# Patient Record
Sex: FEMALE | Race: WHITE | NOT HISPANIC OR LATINO | Employment: FULL TIME | URBAN - METROPOLITAN AREA
[De-identification: names, ages, dates, MRNs, and addresses within clinical notes are randomized per-mention and may not be internally consistent; named-entity substitution may affect disease eponyms.]

---

## 2017-01-16 ENCOUNTER — TELEPHONE (OUTPATIENT)
Dept: FAMILY MEDICINE CLINIC | Facility: CLINIC | Age: 57
End: 2017-01-16

## 2017-01-16 RX ORDER — EZETIMIBE 10 MG/1
10 TABLET ORAL DAILY
Qty: 30 TABLET | Refills: 1 | Status: SHIPPED | OUTPATIENT
Start: 2017-01-16 | End: 2017-02-27 | Stop reason: SDUPTHER

## 2017-01-16 RX ORDER — MELOXICAM 15 MG/1
15 TABLET ORAL DAILY
Qty: 30 TABLET | Refills: 5 | Status: SHIPPED | OUTPATIENT
Start: 2017-01-16 | End: 2017-06-09 | Stop reason: ALTCHOICE

## 2017-01-16 NOTE — TELEPHONE ENCOUNTER
Pt stated her finger is still hurting from arthritis and she wants Rx. She said she discussed with you at last visit and you told her you could prescribe something for it, but she can't remember the name.

## 2017-02-15 ENCOUNTER — TELEPHONE (OUTPATIENT)
Dept: FAMILY MEDICINE CLINIC | Facility: CLINIC | Age: 57
End: 2017-02-15

## 2017-02-28 RX ORDER — EZETIMIBE 10 MG/1
TABLET ORAL
Qty: 30 TABLET | Refills: 0 | Status: SHIPPED | OUTPATIENT
Start: 2017-02-28 | End: 2017-03-29 | Stop reason: ALTCHOICE

## 2017-03-29 ENCOUNTER — OFFICE VISIT (OUTPATIENT)
Dept: FAMILY MEDICINE CLINIC | Facility: CLINIC | Age: 57
End: 2017-03-29

## 2017-03-29 VITALS
SYSTOLIC BLOOD PRESSURE: 122 MMHG | BODY MASS INDEX: 19.62 KG/M2 | WEIGHT: 125 LBS | OXYGEN SATURATION: 100 % | DIASTOLIC BLOOD PRESSURE: 56 MMHG | TEMPERATURE: 98.5 F | HEART RATE: 70 BPM | HEIGHT: 67 IN | RESPIRATION RATE: 14 BRPM

## 2017-03-29 DIAGNOSIS — F51.01 PRIMARY INSOMNIA: ICD-10-CM

## 2017-03-29 DIAGNOSIS — E78.5 HYPERLIPIDEMIA, UNSPECIFIED HYPERLIPIDEMIA TYPE: Primary | ICD-10-CM

## 2017-03-29 PROCEDURE — 99214 OFFICE O/P EST MOD 30 MIN: CPT | Performed by: NURSE PRACTITIONER

## 2017-03-29 RX ORDER — ZOLPIDEM TARTRATE 12.5 MG/1
12.5 TABLET, FILM COATED, EXTENDED RELEASE ORAL NIGHTLY PRN
Qty: 90 TABLET | Refills: 0 | Status: SHIPPED | OUTPATIENT
Start: 2017-03-29 | End: 2017-05-10 | Stop reason: SDUPTHER

## 2017-03-29 NOTE — PROGRESS NOTES
"Subjective   Taty Rosario is a 56 y.o. female.     History of Present Illness   Patient states she's been using his anxiety as directed and as always with any type of cholesterol medication is causing her to have severe joint pain throughout her body.    Patient here for refill of her Ambien.  She's been taking a half of the pill at night but she's been waking up lately between 2 and 5 and unable to go to sleep.  She's wondering whether something else that she can do for this.    The following portions of the patient's history were reviewed and updated as appropriate: allergies, current medications, past social history and problem list.    Review of Systems   Constitutional: Positive for fatigue.   All other systems reviewed and are negative.      Objective   /56 (BP Location: Left arm, Patient Position: Sitting, Cuff Size: Adult)  Pulse 70  Temp 98.5 °F (36.9 °C) (Oral)   Resp 14  Ht 66.5\" (168.9 cm)  Wt 125 lb (56.7 kg)  SpO2 100%  BMI 19.87 kg/m2  Physical Exam   Constitutional: She is oriented to person, place, and time. Vital signs are normal. She appears well-developed and well-nourished. No distress.   HENT:   Head: Normocephalic.   Cardiovascular: Normal rate, regular rhythm and normal heart sounds.    Pulmonary/Chest: Effort normal and breath sounds normal.   Neurological: She is alert and oriented to person, place, and time. Gait normal.   Psychiatric: She has a normal mood and affect. Her behavior is normal. Judgment and thought content normal.   Vitals reviewed.    The patient has read and signed the [facility] Controlled Substance Contract.  I will continue to see patient for regular follow up appointments.  They are well controlled on their medication.  CALEB has been reviewed by me and is updated every 3 months. The patient is aware of the potential for addiction and dependence.    Assessment/Plan   Problem List Items Addressed This Visit        Cardiovascular and Mediastinum    " Hyperlipidemia - Primary       Other    Insomnia    Relevant Medications    zolpidem CR (AMBIEN CR) 12.5 MG CR tablet        rto in 3 mons   Take a whole Ambien instead of just have to see if that works does not call the office and we will switch the medication to Lunesta.  Stopped his study and we are still trying to get the injection ordered for her I will contact drug rep ASAP

## 2017-05-10 ENCOUNTER — TELEPHONE (OUTPATIENT)
Dept: FAMILY MEDICINE CLINIC | Facility: CLINIC | Age: 57
End: 2017-05-10

## 2017-05-10 DIAGNOSIS — F51.01 PRIMARY INSOMNIA: ICD-10-CM

## 2017-05-10 RX ORDER — ZOLPIDEM TARTRATE 12.5 MG/1
12.5 TABLET, FILM COATED, EXTENDED RELEASE ORAL NIGHTLY PRN
Qty: 30 TABLET | Refills: 0 | OUTPATIENT
Start: 2017-05-10 | End: 2017-06-09 | Stop reason: SDUPTHER

## 2017-05-17 ENCOUNTER — TELEPHONE (OUTPATIENT)
Dept: CARDIOLOGY | Facility: CLINIC | Age: 57
End: 2017-05-17

## 2017-05-18 ENCOUNTER — OFFICE VISIT (OUTPATIENT)
Dept: CARDIOLOGY | Facility: CLINIC | Age: 57
End: 2017-05-18

## 2017-05-18 ENCOUNTER — HOSPITAL ENCOUNTER (OUTPATIENT)
Dept: CARDIOLOGY | Facility: HOSPITAL | Age: 57
Discharge: HOME | End: 2017-05-18
Attending: INTERNAL MEDICINE | Admitting: INTERNAL MEDICINE

## 2017-05-18 VITALS
WEIGHT: 124.4 LBS | BODY MASS INDEX: 19.99 KG/M2 | HEIGHT: 66 IN | HEART RATE: 79 BPM | DIASTOLIC BLOOD PRESSURE: 88 MMHG | SYSTOLIC BLOOD PRESSURE: 122 MMHG

## 2017-05-18 DIAGNOSIS — R07.2 PRECORDIAL PAIN: ICD-10-CM

## 2017-05-18 DIAGNOSIS — I25.10 CORONARY ARTERY DISEASE INVOLVING NATIVE CORONARY ARTERY OF NATIVE HEART WITHOUT ANGINA PECTORIS: Primary | ICD-10-CM

## 2017-05-18 LAB
BH CV STRESS BP STAGE 1: NORMAL
BH CV STRESS BP STAGE 2: NORMAL
BH CV STRESS BP STAGE 3: NORMAL
BH CV STRESS DURATION MIN STAGE 1: 3
BH CV STRESS DURATION MIN STAGE 2: 3
BH CV STRESS DURATION MIN STAGE 3: 3
BH CV STRESS DURATION MIN STAGE 4: 1
BH CV STRESS DURATION SEC STAGE 1: 0
BH CV STRESS DURATION SEC STAGE 2: 0
BH CV STRESS DURATION SEC STAGE 3: 0
BH CV STRESS DURATION SEC STAGE 4: 30
BH CV STRESS GRADE STAGE 1: 10
BH CV STRESS GRADE STAGE 2: 12
BH CV STRESS GRADE STAGE 3: 14
BH CV STRESS GRADE STAGE 4: 16
BH CV STRESS HR STAGE 1: 113
BH CV STRESS HR STAGE 2: 137
BH CV STRESS HR STAGE 3: 161
BH CV STRESS HR STAGE 4: 171
BH CV STRESS METS STAGE 1: 5
BH CV STRESS METS STAGE 2: 7.5
BH CV STRESS METS STAGE 3: 10
BH CV STRESS METS STAGE 4: 13.5
BH CV STRESS PROTOCOL 1: NORMAL
BH CV STRESS RECOVERY BP: NORMAL MMHG
BH CV STRESS RECOVERY HR: 98 BPM
BH CV STRESS SPEED STAGE 1: 1.7
BH CV STRESS SPEED STAGE 2: 2.5
BH CV STRESS SPEED STAGE 3: 3.4
BH CV STRESS SPEED STAGE 4: 4.2
BH CV STRESS STAGE 1: 1
BH CV STRESS STAGE 2: 2
BH CV STRESS STAGE 3: 3
BH CV STRESS STAGE 4: 4
MAXIMAL PREDICTED HEART RATE: 166 BPM
PERCENT MAX PREDICTED HR: 103.01 %
STRESS BASELINE BP: NORMAL MMHG
STRESS BASELINE HR: 86 BPM
STRESS PERCENT HR: 121 %
STRESS POST ESTIMATED WORKLOAD: 11 METS
STRESS POST EXERCISE DUR MIN: 10 MIN
STRESS POST EXERCISE DUR SEC: 30 SEC
STRESS POST PEAK BP: NORMAL MMHG
STRESS POST PEAK HR: 171 BPM
STRESS TARGET HR: 141 BPM

## 2017-05-18 PROCEDURE — 93017 CV STRESS TEST TRACING ONLY: CPT

## 2017-05-18 PROCEDURE — 93000 ELECTROCARDIOGRAM COMPLETE: CPT | Performed by: INTERNAL MEDICINE

## 2017-05-18 PROCEDURE — 93016 CV STRESS TEST SUPVJ ONLY: CPT | Performed by: INTERNAL MEDICINE

## 2017-05-18 PROCEDURE — 99213 OFFICE O/P EST LOW 20 MIN: CPT | Performed by: INTERNAL MEDICINE

## 2017-05-18 PROCEDURE — 93018 CV STRESS TEST I&R ONLY: CPT | Performed by: INTERNAL MEDICINE

## 2017-05-18 RX ORDER — ASPIRIN 81 MG/1
81 TABLET ORAL DAILY
COMMUNITY

## 2017-05-18 RX ORDER — CETIRIZINE HYDROCHLORIDE 10 MG/1
10 TABLET ORAL DAILY
COMMUNITY
End: 2022-03-07

## 2017-06-09 ENCOUNTER — OFFICE VISIT (OUTPATIENT)
Dept: FAMILY MEDICINE CLINIC | Facility: CLINIC | Age: 57
End: 2017-06-09

## 2017-06-09 VITALS
BODY MASS INDEX: 19.8 KG/M2 | TEMPERATURE: 98.3 F | WEIGHT: 123.2 LBS | OXYGEN SATURATION: 98 % | DIASTOLIC BLOOD PRESSURE: 82 MMHG | SYSTOLIC BLOOD PRESSURE: 122 MMHG | HEIGHT: 66 IN | HEART RATE: 82 BPM

## 2017-06-09 DIAGNOSIS — N39.0 URINARY TRACT INFECTION, SITE UNSPECIFIED: ICD-10-CM

## 2017-06-09 DIAGNOSIS — F51.01 PRIMARY INSOMNIA: ICD-10-CM

## 2017-06-09 DIAGNOSIS — M19.90 ARTHRITIS: ICD-10-CM

## 2017-06-09 DIAGNOSIS — R30.0 DYSURIA: Primary | ICD-10-CM

## 2017-06-09 LAB
BILIRUB BLD-MCNC: NEGATIVE MG/DL
CLARITY, POC: CLEAR
COLOR UR: YELLOW
GLUCOSE UR STRIP-MCNC: NEGATIVE MG/DL
KETONES UR QL: NEGATIVE
LEUKOCYTE EST, POC: ABNORMAL
NITRITE UR-MCNC: NEGATIVE MG/ML
PH UR: 6.5 [PH] (ref 5–8)
PROT UR STRIP-MCNC: ABNORMAL MG/DL
RBC # UR STRIP: ABNORMAL /UL
SP GR UR: 1.02 (ref 1–1.03)
UROBILINOGEN UR QL: NORMAL

## 2017-06-09 PROCEDURE — 99214 OFFICE O/P EST MOD 30 MIN: CPT | Performed by: NURSE PRACTITIONER

## 2017-06-09 PROCEDURE — 81003 URINALYSIS AUTO W/O SCOPE: CPT | Performed by: NURSE PRACTITIONER

## 2017-06-09 RX ORDER — SULFAMETHOXAZOLE AND TRIMETHOPRIM 800; 160 MG/1; MG/1
1 TABLET ORAL 2 TIMES DAILY
Qty: 10 TABLET | Refills: 0 | Status: SHIPPED | OUTPATIENT
Start: 2017-06-09 | End: 2017-08-07

## 2017-06-09 NOTE — PROGRESS NOTES
"Subjective   Taty Rosario is a 56 y.o. female.     History of Present Illness   Urinary Tract Infection: Patient complains of abnormal smelling urine and burning with urination She has had symptoms for 3 days. Patient denies back pain and fever. Patient does not have a history of recurrent UTI.  Patient does not have a history of pyelonephritis.       David joints still hurting [place]ol[place] in the morning and the mobic is not helping at all.     The following portions of the patient's history were reviewed and updated as appropriate: allergies, current medications, past social history and problem list.    Review of Systems   Genitourinary: Positive for dysuria and urgency.   All other systems reviewed and are negative.      Objective   /82 (BP Location: Right arm, Patient Position: Sitting)  Pulse 82  Temp 98.3 °F (36.8 °C)  Ht 65.5\" (166.4 cm)  Wt 123 lb 3.2 oz (55.9 kg)  SpO2 98%  BMI 20.19 kg/m2  Physical Exam   Constitutional: She is oriented to person, place, and time. Vital signs are normal. She appears well-developed and well-nourished. No distress.   HENT:   Head: Normocephalic.   Cardiovascular: Normal rate, regular rhythm and normal heart sounds.    Pulmonary/Chest: Effort normal and breath sounds normal.   Neurological: She is alert and oriented to person, place, and time. Gait normal.   Psychiatric: She has a normal mood and affect. Her behavior is normal. Judgment and thought content normal.   Vitals reviewed.      Assessment/Plan   Problem List Items Addressed This Visit        Nervous and Auditory    Dysuria - Primary    Relevant Orders    POC Urinalysis Dipstick, Automated       Musculoskeletal and Integument    Arthritis    Relevant Medications    diclofenac (VOLTAREN) 50 MG EC tablet       Genitourinary    Urinary tract infection    Relevant Medications    sulfamethoxazole-trimethoprim (BACTRIM DS) 800-160 MG per tablet        rto prn     "

## 2017-06-12 ENCOUNTER — TELEPHONE (OUTPATIENT)
Dept: FAMILY MEDICINE CLINIC | Facility: CLINIC | Age: 57
End: 2017-06-12

## 2017-06-12 RX ORDER — ZOLPIDEM TARTRATE 12.5 MG/1
TABLET, FILM COATED, EXTENDED RELEASE ORAL
Qty: 30 TABLET | Refills: 0 | OUTPATIENT
Start: 2017-06-12 | End: 2017-07-10 | Stop reason: SDUPTHER

## 2017-07-10 DIAGNOSIS — F51.01 PRIMARY INSOMNIA: ICD-10-CM

## 2017-07-10 RX ORDER — ZOLPIDEM TARTRATE 12.5 MG/1
12.5 TABLET, FILM COATED, EXTENDED RELEASE ORAL NIGHTLY PRN
Qty: 30 TABLET | Refills: 0 | OUTPATIENT
Start: 2017-07-10 | End: 2017-08-07 | Stop reason: SDUPTHER

## 2017-08-07 ENCOUNTER — OFFICE VISIT (OUTPATIENT)
Dept: FAMILY MEDICINE CLINIC | Facility: CLINIC | Age: 57
End: 2017-08-07

## 2017-08-07 VITALS
OXYGEN SATURATION: 98 % | TEMPERATURE: 98.5 F | HEART RATE: 70 BPM | DIASTOLIC BLOOD PRESSURE: 70 MMHG | BODY MASS INDEX: 20.22 KG/M2 | WEIGHT: 125.8 LBS | SYSTOLIC BLOOD PRESSURE: 120 MMHG | HEIGHT: 66 IN

## 2017-08-07 DIAGNOSIS — Z00.00 ROUTINE GENERAL MEDICAL EXAMINATION AT HEALTH CARE FACILITY: Primary | ICD-10-CM

## 2017-08-07 DIAGNOSIS — F51.01 PRIMARY INSOMNIA: ICD-10-CM

## 2017-08-07 PROBLEM — N39.0 URINARY TRACT INFECTION: Status: RESOLVED | Noted: 2017-06-09 | Resolved: 2017-08-07

## 2017-08-07 PROBLEM — R30.0 DYSURIA: Status: RESOLVED | Noted: 2017-06-09 | Resolved: 2017-08-07

## 2017-08-07 LAB
BILIRUB BLD-MCNC: NEGATIVE MG/DL
CLARITY, POC: CLEAR
COLOR UR: YELLOW
GLUCOSE UR STRIP-MCNC: NEGATIVE MG/DL
KETONES UR QL: NEGATIVE
LEUKOCYTE EST, POC: NEGATIVE
NITRITE UR-MCNC: NEGATIVE MG/ML
PH UR: 7 [PH] (ref 5–8)
PROT UR STRIP-MCNC: NEGATIVE MG/DL
RBC # UR STRIP: NEGATIVE /UL
SP GR UR: 1.01 (ref 1–1.03)
UROBILINOGEN UR QL: NORMAL

## 2017-08-07 PROCEDURE — 81003 URINALYSIS AUTO W/O SCOPE: CPT | Performed by: NURSE PRACTITIONER

## 2017-08-07 PROCEDURE — 99396 PREV VISIT EST AGE 40-64: CPT | Performed by: NURSE PRACTITIONER

## 2017-08-07 RX ORDER — ZOLPIDEM TARTRATE 12.5 MG/1
12.5 TABLET, FILM COATED, EXTENDED RELEASE ORAL NIGHTLY PRN
Qty: 90 TABLET | Refills: 0 | Status: SHIPPED | OUTPATIENT
Start: 2017-08-07 | End: 2017-11-10 | Stop reason: SDUPTHER

## 2017-08-07 RX ORDER — EVOLOCUMAB 140 MG/ML
INJECTION, SOLUTION SUBCUTANEOUS
COMMUNITY
Start: 2017-07-30 | End: 2018-01-15 | Stop reason: SDUPTHER

## 2017-08-09 LAB
ALBUMIN SERPL-MCNC: 4.3 G/DL (ref 3.5–5.2)
ALBUMIN/GLOB SERPL: 1.8 G/DL
ALP SERPL-CCNC: 133 U/L (ref 39–117)
ALT SERPL-CCNC: 24 U/L (ref 1–33)
AST SERPL-CCNC: 22 U/L (ref 1–32)
BASOPHILS # BLD AUTO: 0.04 10*3/MM3 (ref 0–0.2)
BASOPHILS NFR BLD AUTO: 0.7 % (ref 0–1.5)
BILIRUB SERPL-MCNC: 0.4 MG/DL (ref 0.1–1.2)
BUN SERPL-MCNC: 24 MG/DL (ref 6–20)
BUN/CREAT SERPL: 24.2 (ref 7–25)
CALCIUM SERPL-MCNC: 9.7 MG/DL (ref 8.6–10.5)
CHLORIDE SERPL-SCNC: 104 MMOL/L (ref 98–107)
CHOLEST SERPL-MCNC: 194 MG/DL (ref 0–200)
CO2 SERPL-SCNC: 27.9 MMOL/L (ref 22–29)
CREAT SERPL-MCNC: 0.99 MG/DL (ref 0.57–1)
EOSINOPHIL # BLD AUTO: 0.16 10*3/MM3 (ref 0–0.7)
EOSINOPHIL NFR BLD AUTO: 3 % (ref 0.3–6.2)
ERYTHROCYTE [DISTWIDTH] IN BLOOD BY AUTOMATED COUNT: 14 % (ref 11.7–13)
GLOBULIN SER CALC-MCNC: 2.4 GM/DL
GLUCOSE SERPL-MCNC: 103 MG/DL (ref 65–99)
HCT VFR BLD AUTO: 44.6 % (ref 35.6–45.5)
HDLC SERPL-MCNC: 112 MG/DL (ref 40–60)
HGB BLD-MCNC: 13.8 G/DL (ref 11.9–15.5)
IMM GRANULOCYTES # BLD: 0 10*3/MM3 (ref 0–0.03)
IMM GRANULOCYTES NFR BLD: 0 % (ref 0–0.5)
LDLC SERPL CALC-MCNC: 66 MG/DL (ref 0–100)
LDLC/HDLC SERPL: 0.59 {RATIO}
LYMPHOCYTES # BLD AUTO: 2.13 10*3/MM3 (ref 0.9–4.8)
LYMPHOCYTES NFR BLD AUTO: 39.9 % (ref 19.6–45.3)
MCH RBC QN AUTO: 31 PG (ref 26.9–32)
MCHC RBC AUTO-ENTMCNC: 30.9 G/DL (ref 32.4–36.3)
MCV RBC AUTO: 100.2 FL (ref 80.5–98.2)
MONOCYTES # BLD AUTO: 0.42 10*3/MM3 (ref 0.2–1.2)
MONOCYTES NFR BLD AUTO: 7.9 % (ref 5–12)
NEUTROPHILS # BLD AUTO: 2.59 10*3/MM3 (ref 1.9–8.1)
NEUTROPHILS NFR BLD AUTO: 48.5 % (ref 42.7–76)
PLATELET # BLD AUTO: 285 10*3/MM3 (ref 140–500)
POTASSIUM SERPL-SCNC: 4.5 MMOL/L (ref 3.5–5.2)
PROT SERPL-MCNC: 6.7 G/DL (ref 6–8.5)
RBC # BLD AUTO: 4.45 10*6/MM3 (ref 3.9–5.2)
SODIUM SERPL-SCNC: 144 MMOL/L (ref 136–145)
TRIGL SERPL-MCNC: 81 MG/DL (ref 0–150)
VLDLC SERPL CALC-MCNC: 16.2 MG/DL (ref 5–40)
WBC # BLD AUTO: 5.34 10*3/MM3 (ref 4.5–10.7)

## 2017-10-29 DIAGNOSIS — M19.90 ARTHRITIS: ICD-10-CM

## 2017-11-10 ENCOUNTER — TELEPHONE (OUTPATIENT)
Dept: FAMILY MEDICINE CLINIC | Facility: CLINIC | Age: 57
End: 2017-11-10

## 2017-11-10 DIAGNOSIS — F51.01 PRIMARY INSOMNIA: ICD-10-CM

## 2017-11-10 RX ORDER — ZOLPIDEM TARTRATE 12.5 MG/1
12.5 TABLET, FILM COATED, EXTENDED RELEASE ORAL NIGHTLY PRN
Qty: 30 TABLET | Refills: 0 | OUTPATIENT
Start: 2017-11-10 | End: 2017-11-22 | Stop reason: SDUPTHER

## 2017-11-10 NOTE — TELEPHONE ENCOUNTER
Patient is requesting a refill on Ambien 12.5mg take one tablet by mouth at night. Please call her once this has been sent in

## 2017-11-22 ENCOUNTER — OFFICE VISIT (OUTPATIENT)
Dept: FAMILY MEDICINE CLINIC | Facility: CLINIC | Age: 57
End: 2017-11-22

## 2017-11-22 VITALS
SYSTOLIC BLOOD PRESSURE: 112 MMHG | WEIGHT: 128.6 LBS | HEART RATE: 67 BPM | OXYGEN SATURATION: 98 % | DIASTOLIC BLOOD PRESSURE: 70 MMHG | HEIGHT: 66 IN | TEMPERATURE: 98.7 F | BODY MASS INDEX: 20.67 KG/M2

## 2017-11-22 DIAGNOSIS — F51.01 PRIMARY INSOMNIA: ICD-10-CM

## 2017-11-22 PROCEDURE — 99213 OFFICE O/P EST LOW 20 MIN: CPT | Performed by: NURSE PRACTITIONER

## 2017-11-22 RX ORDER — ZOLPIDEM TARTRATE 12.5 MG/1
12.5 TABLET, FILM COATED, EXTENDED RELEASE ORAL NIGHTLY PRN
Qty: 90 TABLET | Refills: 0 | Status: SHIPPED | OUTPATIENT
Start: 2017-11-22 | End: 2018-03-09 | Stop reason: SDUPTHER

## 2017-11-22 RX ORDER — LOTEPREDNOL ETABONATE AND TOBRAMYCIN 5; 3 MG/ML; MG/ML
SUSPENSION/ DROPS OPHTHALMIC
COMMUNITY
Start: 2017-10-19 | End: 2018-06-15

## 2017-11-22 NOTE — PROGRESS NOTES
"Subjective   Taty Rosario is a 57 y.o. female.     History of Present Illness   Taty Rosario 57 y.o. female presents for follow up of insomnia with onset of symptoms years ago. Patient describes symptoms as difficulty falling asleep. Patient has found complete relief with prescription sleep aid, Ambien. Associated symptoms include: fatigue if unable to take Rx . Patient denies daytime somnolence Symptoms have stabilized.  The patient has failed multiple OTC medications for insomnia.  They are well controlled on current Rx and will continue to try to take Rx PRN.  She will use the lowest effective dose.  The patient has read and signed the [facility] Controlled Substance Contract.  I will continue to see patient for regular follow up appointments and be prescribed the lowest effective dose.  CALEB has been reviewed by me and is updated every 3 months. The patient is aware of the potential for addiction and dependence.  She denies that Ambien cause excessive daytime drowsiness and sleep walking.    Her insurance payment on Repatha  has gone up $260 a month so therefore she's not used it for the past 2 months.    The following portions of the patient's history were reviewed and updated as appropriate: allergies, current medications, past social history and problem list.    Review of Systems   Constitutional: Negative for fever.   All other systems reviewed and are negative.      Objective   /70 (BP Location: Right arm, Patient Position: Sitting)  Pulse 67  Temp 98.7 °F (37.1 °C)  Ht 65.5\" (166.4 cm)  Wt 128 lb 9.6 oz (58.3 kg)  SpO2 98%  BMI 21.07 kg/m2  Physical Exam   Constitutional: She is oriented to person, place, and time. Vital signs are normal. She appears well-developed and well-nourished. No distress.   HENT:   Head: Normocephalic.   Cardiovascular: Normal rate, regular rhythm and normal heart sounds.    Pulmonary/Chest: Effort normal and breath sounds normal.   Neurological: She is " alert and oriented to person, place, and time. Gait normal.   Psychiatric: She has a normal mood and affect. Her behavior is normal. Judgment and thought content normal.   Vitals reviewed.      Assessment/Plan   Problem List Items Addressed This Visit        Other    Insomnia    Relevant Medications    zolpidem CR (AMBIEN CR) 12.5 MG CR tablet        rto in 3 mons    I printed off a new co-pay card for Repatha for her that does not work she is to call the office

## 2017-12-05 ENCOUNTER — TELEPHONE (OUTPATIENT)
Dept: FAMILY MEDICINE CLINIC | Facility: CLINIC | Age: 57
End: 2017-12-05

## 2017-12-05 NOTE — TELEPHONE ENCOUNTER
Patient was returning your call she was wondering if you can call her between 11:45-12:25 or after 3:50 due to her work schedule.

## 2017-12-05 NOTE — TELEPHONE ENCOUNTER
Patient states the repatha card given to her to renew does not have an ID number on it? Did you give her a card?

## 2017-12-05 NOTE — TELEPHONE ENCOUNTER
In your last note it states that you printed off a copay card for her and if it does not work for patient to call the office. What do I need to tell the patient?

## 2017-12-07 NOTE — TELEPHONE ENCOUNTER
Spoke with Drug rep on Monday she said for patient to call [phone] and to speak to someone to get a copay card    Patient called the number was transferred to 5 different departments and still did not receive any help or a copay card    Left a message with Cailin the [facility] drug rep letting her know this information and to call the office back to let us know if there is anything else that can be done to help patient get her copay card

## 2017-12-08 NOTE — TELEPHONE ENCOUNTER
Luke Simeon called back and said that she apologizes for patient having to deal with the run around and that she will bring in a co pay card on Monday     Patient aware that she will get a phone call when she brings the card

## 2018-01-15 ENCOUNTER — TELEPHONE (OUTPATIENT)
Dept: FAMILY MEDICINE CLINIC | Facility: CLINIC | Age: 58
End: 2018-01-15

## 2018-01-15 RX ORDER — EVOLOCUMAB 140 MG/ML
140 INJECTION, SOLUTION SUBCUTANEOUS
Qty: 6 PEN | Refills: 3 | Status: SHIPPED | OUTPATIENT
Start: 2018-01-15 | End: 2018-12-06 | Stop reason: SDUPTHER

## 2018-01-15 NOTE — TELEPHONE ENCOUNTER
[facility] needs a new written prescription for the year. Medication: REPATHA SURECLICK 140 MG/ML solution auto-injector.

## 2018-02-05 DIAGNOSIS — M19.90 ARTHRITIS: ICD-10-CM

## 2018-03-08 DIAGNOSIS — F51.01 PRIMARY INSOMNIA: ICD-10-CM

## 2018-03-09 DIAGNOSIS — F51.01 PRIMARY INSOMNIA: ICD-10-CM

## 2018-03-09 RX ORDER — ZOLPIDEM TARTRATE 12.5 MG/1
TABLET, FILM COATED, EXTENDED RELEASE ORAL
Qty: 30 TABLET | Refills: 0 | OUTPATIENT
Start: 2018-03-09 | End: 2018-03-12 | Stop reason: SDUPTHER

## 2018-03-09 RX ORDER — ZOLPIDEM TARTRATE 12.5 MG/1
TABLET, FILM COATED, EXTENDED RELEASE ORAL
Qty: 30 TABLET | Refills: 0 | OUTPATIENT
Start: 2018-03-09

## 2018-03-12 ENCOUNTER — OFFICE VISIT (OUTPATIENT)
Dept: FAMILY MEDICINE CLINIC | Facility: CLINIC | Age: 58
End: 2018-03-12

## 2018-03-12 VITALS
SYSTOLIC BLOOD PRESSURE: 124 MMHG | DIASTOLIC BLOOD PRESSURE: 70 MMHG | BODY MASS INDEX: 20.39 KG/M2 | HEIGHT: 66 IN | TEMPERATURE: 98.3 F | HEART RATE: 64 BPM | OXYGEN SATURATION: 97 % | WEIGHT: 126.9 LBS

## 2018-03-12 DIAGNOSIS — Z91.09 ENVIRONMENTAL ALLERGIES: ICD-10-CM

## 2018-03-12 DIAGNOSIS — F51.01 PRIMARY INSOMNIA: Primary | ICD-10-CM

## 2018-03-12 PROCEDURE — 99213 OFFICE O/P EST LOW 20 MIN: CPT | Performed by: NURSE PRACTITIONER

## 2018-03-12 PROCEDURE — 96372 THER/PROPH/DIAG INJ SC/IM: CPT | Performed by: NURSE PRACTITIONER

## 2018-03-12 RX ORDER — ZOLPIDEM TARTRATE 12.5 MG/1
12.5 TABLET, FILM COATED, EXTENDED RELEASE ORAL NIGHTLY PRN
Qty: 90 TABLET | Refills: 0 | Status: SHIPPED | OUTPATIENT
Start: 2018-03-12 | End: 2018-06-04 | Stop reason: SDUPTHER

## 2018-03-12 RX ORDER — TRIAMCINOLONE ACETONIDE 40 MG/ML
40 INJECTION, SUSPENSION INTRA-ARTICULAR; INTRAMUSCULAR ONCE
Status: COMPLETED | OUTPATIENT
Start: 2018-03-12 | End: 2018-03-12

## 2018-03-12 RX ORDER — NEOMYCIN SULFATE, POLYMYXIN B SULFATE AND DEXAMETHASONE 3.5; 10000; 1 MG/ML; [USP'U]/ML; MG/ML
SUSPENSION/ DROPS OPHTHALMIC
Refills: 0 | COMMUNITY
Start: 2018-03-06 | End: 2018-06-15

## 2018-03-12 RX ADMIN — TRIAMCINOLONE ACETONIDE 40 MG: 40 INJECTION, SUSPENSION INTRA-ARTICULAR; INTRAMUSCULAR at 10:12

## 2018-03-12 NOTE — PROGRESS NOTES
"Subjective   Taty Rosario is a 57 y.o. female.     History of Present Illness   Taty Rosario 57 y.o. female presents for follow up of insomnia with onset of symptoms years ago. Patient describes symptoms as frequent night time awakening and difficulty falling asleep. Patient has found complete relief with Ambien (Zolpidem). Associated symptoms include: fatigue if unable to take Rx . Patient denies daytime somnolence Symptoms have stabilized.  The patient has failed multiple OTC medications for insomnia.  They are well controlled on current Rx and will continue to try to take Rx PRN.  She will use the lowest effective dose.  The patient has read and signed the [facility] Controlled Substance Contract.  I will continue to see patient for regular follow up appointments and be prescribed the lowest effective dose.  CALEB has been reviewed by me and is updated every 3 months. The patient is aware of the potential for addiction and dependence.  She denies that Ambien (Zolpidem) causes excessive daytime drowsiness and sleep walking.  Patient voices understanding to take Ambien (Zolpidem) and go straight to bed. Patient must be able to sleep 7 hours or more when taking this.  Patient will hold Rx and contact me if they experience any impaired mental alertness the next day.      Pt has had a cough since last Thrusday.  Thinking it is allergies and is taking Zyrtec and she is much better today.  Dry cough and drainage   The following portions of the patient's history were reviewed and updated as appropriate: allergies, current medications, past social history and problem list.    Review of Systems   HENT: Positive for congestion.    Respiratory: Positive for cough.    All other systems reviewed and are negative.      Objective   /70 (BP Location: Left arm, Patient Position: Sitting)   Pulse 64   Temp 98.3 °F (36.8 °C)   Ht 166.4 cm (65.51\")   Wt 57.6 kg (126 lb 14.4 oz)   SpO2 97%   BMI 20.79 kg/m² "   Physical Exam   Constitutional: She is oriented to person, place, and time. Vital signs are normal. She appears well-developed and well-nourished. No distress.   HENT:   Head: Normocephalic.   Cardiovascular: Normal rate, regular rhythm and normal heart sounds.    Pulmonary/Chest: Effort normal and breath sounds normal.   Neurological: She is alert and oriented to person, place, and time. Gait normal.   Psychiatric: She has a normal mood and affect. Her behavior is normal. Judgment and thought content normal.   Vitals reviewed.      Assessment/Plan   Problem List Items Addressed This Visit        Other    Insomnia - Primary    Relevant Medications    zolpidem CR (AMBIEN CR) 12.5 MG CR tablet    Environmental allergies    Relevant Medications    triamcinolone acetonide (KENALOG-40) injection 40 mg (Start on 3/12/2018 10:45 AM)      Other Visit Diagnoses    None.       rto in 3 mons

## 2018-04-12 DIAGNOSIS — M19.90 ARTHRITIS: ICD-10-CM

## 2018-05-04 ENCOUNTER — CLINICAL SUPPORT (OUTPATIENT)
Dept: FAMILY MEDICINE CLINIC | Facility: CLINIC | Age: 58
End: 2018-05-04

## 2018-05-04 DIAGNOSIS — Z23 NEED FOR VACCINATION: Primary | ICD-10-CM

## 2018-05-04 PROCEDURE — 90471 IMMUNIZATION ADMIN: CPT | Performed by: NURSE PRACTITIONER

## 2018-05-04 PROCEDURE — 90632 HEPA VACCINE ADULT IM: CPT | Performed by: NURSE PRACTITIONER

## 2018-05-17 DIAGNOSIS — M19.90 ARTHRITIS: ICD-10-CM

## 2018-06-04 DIAGNOSIS — F51.01 PRIMARY INSOMNIA: ICD-10-CM

## 2018-06-04 RX ORDER — ZOLPIDEM TARTRATE 12.5 MG/1
12.5 TABLET, FILM COATED, EXTENDED RELEASE ORAL NIGHTLY PRN
Qty: 90 TABLET | Refills: 0 | OUTPATIENT
Start: 2018-06-04 | End: 2018-07-09 | Stop reason: SDUPTHER

## 2018-06-15 ENCOUNTER — OFFICE VISIT (OUTPATIENT)
Dept: FAMILY MEDICINE CLINIC | Facility: CLINIC | Age: 58
End: 2018-06-15

## 2018-06-15 VITALS
OXYGEN SATURATION: 98 % | DIASTOLIC BLOOD PRESSURE: 82 MMHG | HEIGHT: 66 IN | HEART RATE: 64 BPM | SYSTOLIC BLOOD PRESSURE: 120 MMHG | WEIGHT: 121.4 LBS | TEMPERATURE: 98.4 F | BODY MASS INDEX: 19.51 KG/M2

## 2018-06-15 DIAGNOSIS — M19.90 ARTHRITIS: ICD-10-CM

## 2018-06-15 DIAGNOSIS — F51.01 PRIMARY INSOMNIA: Primary | ICD-10-CM

## 2018-06-15 DIAGNOSIS — M25.562 ACUTE PAIN OF BOTH KNEES: ICD-10-CM

## 2018-06-15 DIAGNOSIS — Z12.39 ENCOUNTER FOR SCREENING BREAST EXAMINATION: ICD-10-CM

## 2018-06-15 DIAGNOSIS — M25.561 ACUTE PAIN OF BOTH KNEES: ICD-10-CM

## 2018-06-15 PROCEDURE — 99213 OFFICE O/P EST LOW 20 MIN: CPT | Performed by: NURSE PRACTITIONER

## 2018-06-15 NOTE — PROGRESS NOTES
Subjective   Taty Rosario is a 57 y.o. female.     History of Present Illness   Taty Rosario 57 y.o. female presents for follow up of insomnia with onset of symptoms years ago. Patient describes symptoms as frequent night time awakening and difficulty falling asleep. Patient has found complete relief with Ambien (Zolpidem). Associated symptoms include: fatigue if unable to take Rx . Patient denies daytime somnolence Symptoms have stabilized.  The patient has failed multiple OTC medications for insomnia.  They are well controlled on current Rx and will continue to try to take Rx PRN.  She will use the lowest effective dose.  The patient has read and signed the [facility] Controlled Substance Contract.  I will continue to see patient for regular follow up appointments and be prescribed the lowest effective dose.  CALEB has been reviewed by me and is updated every 3 months. The patient is aware of the potential for addiction and dependence.  She denies that Ambien (Zolpidem) causes excessive daytime drowsiness and sleep walking.  Patient voices understanding to take Ambien (Zolpidem) and go straight to bed. Patient must be able to sleep 7 hours or more when taking this.  Patient will hold Rx and contact me if they experience any impaired mental alertness the next day.      Patient is also requesting a breast exam she's not having any issues and she does do them at home her last mammogram was in october so she's not due again until october of this year.    she also brought a row machine in january and she used it she went harder on the resistance and it causes her to have bilateral knee pain which at this point is gone.    The following portions of the patient's history were reviewed and updated as appropriate: allergies, current medications, past social history and problem list.    Review of Systems   Constitutional: Negative for fever.   All other systems reviewed and are negative.      Objective   BP  "120/82 (BP Location: Left arm, Patient Position: Sitting)   Pulse 64   Temp 98.4 °F (36.9 °C)   Ht 166.4 cm (65.51\")   Wt 55.1 kg (121 lb 6.4 oz)   SpO2 98%   BMI 19.89 kg/m²   Physical Exam   Constitutional: She is oriented to person, place, and time. Vital signs are normal. She appears well-developed and well-nourished. No distress.   HENT:   Head: Normocephalic.   Cardiovascular: Normal rate, regular rhythm and normal heart sounds.    Pulmonary/Chest: Effort normal and breath sounds normal. Right breast exhibits no mass, no nipple discharge and no skin change. Left breast exhibits no mass, no nipple discharge and no skin change.   Neurological: She is alert and oriented to person, place, and time. Gait normal.   Psychiatric: She has a normal mood and affect. Her behavior is normal. Judgment and thought content normal.   Vitals reviewed.      Assessment/Plan   Problem List Items Addressed This Visit        Musculoskeletal and Integument    Acute pain of both knees       Other    Insomnia - Primary    Encounter for screening breast examination        rto in 3 mons   Go slower for less duration and less resistance with the rowing machine.       "

## 2018-06-28 ENCOUNTER — OFFICE VISIT (OUTPATIENT)
Dept: CARDIOLOGY | Facility: CLINIC | Age: 58
End: 2018-06-28

## 2018-06-28 VITALS
HEART RATE: 75 BPM | OXYGEN SATURATION: 100 % | DIASTOLIC BLOOD PRESSURE: 62 MMHG | HEIGHT: 65 IN | BODY MASS INDEX: 20.33 KG/M2 | SYSTOLIC BLOOD PRESSURE: 116 MMHG | WEIGHT: 122 LBS

## 2018-06-28 DIAGNOSIS — Z95.5 HISTORY OF CORONARY ARTERY STENT PLACEMENT: ICD-10-CM

## 2018-06-28 DIAGNOSIS — I25.10 CORONARY ARTERY DISEASE INVOLVING NATIVE CORONARY ARTERY OF NATIVE HEART WITHOUT ANGINA PECTORIS: Primary | ICD-10-CM

## 2018-06-28 PROBLEM — N39.46 MIXED STRESS AND URGE URINARY INCONTINENCE: Status: ACTIVE | Noted: 2018-03-12

## 2018-06-28 PROBLEM — R35.0 INCREASED FREQUENCY OF URINATION: Status: ACTIVE | Noted: 2018-03-12

## 2018-06-28 PROBLEM — R39.191: Status: ACTIVE | Noted: 2018-03-12

## 2018-06-28 PROCEDURE — 99213 OFFICE O/P EST LOW 20 MIN: CPT | Performed by: PHYSICIAN ASSISTANT

## 2018-06-28 PROCEDURE — 93000 ELECTROCARDIOGRAM COMPLETE: CPT | Performed by: PHYSICIAN ASSISTANT

## 2018-06-28 NOTE — PROGRESS NOTES
Date of Office Visit: 2018  Encounter Provider: NANCY Darden  Place of Service: [facility] CARDIOLOGY  Patient Name: Taty Rosario  :1960    Chief Complaint   Patient presents with   • Coronary Artery Disease     1 year follow up   :     HPI: Tayt Rosario is a 57 y.o. female, new to me, who presents today for follow-up.  Old records have been obtained and reviewed by me.  She is a patient of Dr. Martin's with a past cardiac history significant for coronary artery disease.  She had drug-eluting stent placement to her mid LAD in  after classic angina.  She has been intolerant to Lipitor in the past.  She was last in our office to see Dr. Martin on 2017.  At that visit she had some complaints of chest pain.  Dr. Martin had her perform a treadmill stress test.  She exercised for 10-1/2 minutes on the treadmill with excellent exercise capacity, no ECG changes, and baseline atypical chest pain that got a little bit worse with exercise.  Dr. Martin recommended no changes.  She's here today for yearly visit.   Over the past year she's been doing well.  She thinks that the chest pain that she had last year was secondary to stress.  It has resolved.  She is a , and she is currently on summer break.  She has no stress and no pain.  She does regular exercise, and has been on Repatha shots without difficulty.  She denies any chest pain, shortness of breath, palpitations, edema, dizziness, or syncope.      Past Medical History:   Diagnosis Date   • Allergic     bronchitis, sinustitis   • Anxiety    • Arthritis    • Cervicalgia    • Chest pain    • Coronary artery disease    • Depression    • Hyperlipidemia 3/29/2017   • Hypertension    • Insomnia    • SOB (shortness of breath)    • Tendonitis    • UTI (urinary tract infection)        Past Surgical History:   Procedure Laterality Date   • BREAST SURGERY      cosmetic procedures   • CARDIAC  CATHETERIZATION     • CARDIAC CATHETERIZATION     • HYSTERECTOMY     • NECK SURGERY     • PARTIAL HYSTERECTOMY     • SPINE SURGERY      spinal fusion of cervical region       Social History     Social History   • Marital status:      Spouse name: N/A   • Number of children: N/A   • Years of education: N/A     Occupational History   • Not on file.     Social History Main Topics   • Smoking status: Never Smoker   • Smokeless tobacco: Never Used   • Alcohol use 1.2 oz/week     1 Shots of liquor, 1 Glasses of wine per week      Comment: occ   • Drug use: No   • Sexual activity: Defer     Other Topics Concern   • Not on file     Social History Narrative   • No narrative on file       Family History   Problem Relation Age of Onset   • Cancer Mother         breast cancer   • Arthritis Mother    • Stroke Mother    • Heart disease Father        Review of Systems   Constitution: Negative for chills, fever and malaise/fatigue.   Cardiovascular: Negative for chest pain, dyspnea on exertion, leg swelling, near-syncope, orthopnea, palpitations, paroxysmal nocturnal dyspnea and syncope.   Respiratory: Negative for cough and shortness of breath.    Musculoskeletal: Negative for joint pain, joint swelling and myalgias.   Gastrointestinal: Negative for abdominal pain, diarrhea, melena, nausea and vomiting.   Genitourinary: Negative for frequency and hematuria.   Neurological: Negative for light-headedness, numbness, paresthesias and seizures.   Allergic/Immunologic: Negative.    All other systems reviewed and are negative.      Allergies   Allergen Reactions   • Codeine Itching   • Lipitor [Atorvastatin] Myalgia     Other reaction(s): Myalgia   • Oxycodone-Acetaminophen Itching   • Pravachol [Pravastatin Sodium] Myalgia   • Pravastatin Myalgia     Other reaction(s): Myalgia         Current Outpatient Prescriptions:   •  aspirin 81 MG EC tablet, Take 81 mg by mouth Daily., Disp: , Rfl:   •  Biotin 1 MG capsule, Take 1 tablet  "by mouth Daily., Disp: , Rfl:   •  cetirizine (zyrTEC) 10 MG tablet, Take 10 mg by mouth Daily., Disp: , Rfl:   •  Cholecalciferol (VITAMIN D-3) 1000 UNITS capsule, Take 1,000 Units by mouth Daily., Disp: , Rfl:   •  diclofenac (VOLTAREN) 50 MG EC tablet, TAKE 1 TABLET BY MOUTH TWO TIMES A DAY , Disp: 60 tablet, Rfl: 0  •  GLUCOS-CHONDROIT-CA-MG-C-D PO, Take 1 tablet by mouth Daily., Disp: , Rfl:   •  Multiple Minerals-Vitamins (CALCIUM CITRATE +) tablet, Take 1 tablet by mouth Daily., Disp: , Rfl:   •  nebivolol (BYSTOLIC) 5 MG tablet, Take 5 mg by mouth. Takes three times a week, Disp: , Rfl:   •  REPATHA SURECLICK 140 MG/ML solution auto-injector, Inject 140 mg under the skin Every 14 (Fourteen) Days. Inject 140mg sc [id number], Disp: 6 pen, Rfl: 3  •  RESTASIS 0.05 % ophthalmic emulsion, Administer 1 drop to both eyes Every 12 (Twelve) Hours., Disp: , Rfl:   •  zolpidem CR (AMBIEN CR) 12.5 MG CR tablet, Take 1 tablet by mouth At Night As Needed for Sleep., Disp: 90 tablet, Rfl: 0      Objective:     Vitals:    06/28/18 1312 06/28/18 1319   BP: 110/60 116/62   BP Location: Right arm Left arm   Pulse: 75    SpO2: 100%    Weight: 55.3 kg (122 lb)    Height: 165.1 cm (65\")      Body mass index is 20.3 kg/m².    PHYSICAL EXAM:    Physical Exam   Constitutional: She is oriented to person, place, and time. She appears well-developed and well-nourished. No distress.   HENT:   Head: Normocephalic and atraumatic.   Eyes: Pupils are equal, round, and reactive to light.   Neck: No JVD present. No thyromegaly present.   Cardiovascular: Normal rate, regular rhythm, normal heart sounds and intact distal pulses.    No murmur heard.  Pulmonary/Chest: Effort normal and breath sounds normal. No respiratory distress.   Abdominal: Soft. Bowel sounds are normal. She exhibits no distension. There is no splenomegaly or hepatomegaly. There is no tenderness.   Musculoskeletal: Normal range of motion. She exhibits no edema.   Neurological: " She is alert and oriented to person, place, and time.   Skin: Skin is warm and dry. She is not diaphoretic. No erythema.   Psychiatric: She has a normal mood and affect. Her behavior is normal. Judgment normal.         ECG 12 Lead  Date/Time: 2018 1:27 PM  Performed by: SINCERE FARMER  Authorized by: SINCERE FARMER   Comparison: compared with previous ECG from 2017  Similar to previous ECG  Rhythm: sinus rhythm  BPM: 75  Clinical impression: normal ECG  Comments: Indication: Coronary disease, status post stent placement.              Assessment:       Diagnosis Plan   1. Coronary artery disease involving native coronary artery of native heart without angina pectoris  ECG 12 Lead   2. History of coronary artery stent placement  ECG 12 Lead     Orders Placed This Encounter   Procedures   • ECG 12 Lead     This order was created via procedure documentation          Plan:       1.  Coronary Artery Disease  Assessment  • The patient has no angina    Plan  • Lifestyle modifications discussed include adhering to a heart healthy diet, maintenance of a healthy weight, medication compliance and regular exercise    Subjective - Objective  • There has been a previous stent procedure using SAMEERA  • Current antiplatelet therapy includes aspirin 81 mg  • She has excellent risk factor modification.  She is a little concerned about her heart because her father  last year at age 81 of a heart attack.  I did try to reassure her.  I'm not going to make any changes to her medical regimen today, and she will follow-up with Dr. Martin in 1 year or sooner if needed.      As always, it has been a pleasure to participate in your patient's care.      Sincerely,         Sincere Jamison PA-C

## 2018-07-09 DIAGNOSIS — F51.01 PRIMARY INSOMNIA: ICD-10-CM

## 2018-07-09 RX ORDER — ZOLPIDEM TARTRATE 12.5 MG/1
TABLET, FILM COATED, EXTENDED RELEASE ORAL
Qty: 30 TABLET | Refills: 0 | OUTPATIENT
Start: 2018-07-09 | End: 2018-08-08 | Stop reason: SDUPTHER

## 2018-07-25 DIAGNOSIS — M19.90 ARTHRITIS: ICD-10-CM

## 2018-08-08 DIAGNOSIS — F51.01 PRIMARY INSOMNIA: ICD-10-CM

## 2018-08-08 RX ORDER — ZOLPIDEM TARTRATE 12.5 MG/1
TABLET, FILM COATED, EXTENDED RELEASE ORAL
Qty: 30 TABLET | Refills: 0 | OUTPATIENT
Start: 2018-08-08 | End: 2018-09-07 | Stop reason: SDUPTHER

## 2018-08-22 DIAGNOSIS — M19.90 ARTHRITIS: ICD-10-CM

## 2018-09-06 DIAGNOSIS — F51.01 PRIMARY INSOMNIA: ICD-10-CM

## 2018-09-07 DIAGNOSIS — F51.01 PRIMARY INSOMNIA: ICD-10-CM

## 2018-09-07 RX ORDER — ZOLPIDEM TARTRATE 12.5 MG/1
TABLET, FILM COATED, EXTENDED RELEASE ORAL
Qty: 30 TABLET | Refills: 0 | OUTPATIENT
Start: 2018-09-07

## 2018-09-07 RX ORDER — ZOLPIDEM TARTRATE 12.5 MG/1
TABLET, FILM COATED, EXTENDED RELEASE ORAL
Qty: 30 TABLET | Refills: 0 | Status: SHIPPED | OUTPATIENT
Start: 2018-09-07 | End: 2018-10-07 | Stop reason: SDUPTHER

## 2018-09-07 NOTE — TELEPHONE ENCOUNTER
Patient is requesting a refill on Zolpidem CR 12.5 mg. She scheduled an appointment for 9/19 and is wondering if you can go ahead and refill it for her.

## 2018-09-19 ENCOUNTER — OFFICE VISIT (OUTPATIENT)
Dept: FAMILY MEDICINE CLINIC | Facility: CLINIC | Age: 58
End: 2018-09-19

## 2018-09-19 VITALS
BODY MASS INDEX: 20.76 KG/M2 | WEIGHT: 124.6 LBS | HEART RATE: 61 BPM | TEMPERATURE: 98.3 F | HEIGHT: 65 IN | OXYGEN SATURATION: 99 % | SYSTOLIC BLOOD PRESSURE: 136 MMHG | DIASTOLIC BLOOD PRESSURE: 70 MMHG

## 2018-09-19 DIAGNOSIS — Z23 NEED FOR TDAP VACCINATION: Primary | ICD-10-CM

## 2018-09-19 DIAGNOSIS — F51.01 PRIMARY INSOMNIA: ICD-10-CM

## 2018-09-19 PROCEDURE — 99213 OFFICE O/P EST LOW 20 MIN: CPT | Performed by: NURSE PRACTITIONER

## 2018-09-19 PROCEDURE — 90471 IMMUNIZATION ADMIN: CPT | Performed by: NURSE PRACTITIONER

## 2018-09-19 PROCEDURE — 90715 TDAP VACCINE 7 YRS/> IM: CPT | Performed by: NURSE PRACTITIONER

## 2018-09-19 RX ORDER — ZOLPIDEM TARTRATE 12.5 MG/1
TABLET, FILM COATED, EXTENDED RELEASE ORAL
Qty: 90 TABLET | Refills: 0 | Status: CANCELLED | OUTPATIENT
Start: 2018-09-19

## 2018-09-19 NOTE — PROGRESS NOTES
"Subjective   Taty Rosario is a 58 y.o. female.     History of Present Illness   Taty Rosario 58 y.o. female presents for follow up of insomnia with onset of symptoms years ago. Patient describes symptoms as frequent night time awakening and difficulty falling asleep. Patient has found complete relief with Ambien (Zolpidem). Associated symptoms include: fatigue if unable to take Rx . Patient denies daytime somnolence Symptoms have stabilized.  The patient has failed multiple OTC medications for insomnia.  They are well controlled on current Rx and will continue to try to take Rx PRN.  She will use the lowest effective dose.  The patient has read and signed the [facility] Controlled Substance Contract.  I will continue to see patient for regular follow up appointments and be prescribed the lowest effective dose.  CALEB has been reviewed by me and is updated every 3 months. The patient is aware of the potential for addiction and dependence.  She denies that Ambien (Zolpidem) causes excessive daytime drowsiness and sleep walking.  Patient voices understanding to take Ambien (Zolpidem) and go straight to bed. Patient must be able to sleep 7 hours or more when taking this.  Patient will hold Rx and contact me if they experience any impaired mental alertness the next day.          The following portions of the patient's history were reviewed and updated as appropriate: allergies, current medications, past social history and problem list.    Review of Systems   All other systems reviewed and are negative.      Objective   /70 (BP Location: Right arm, Patient Position: Sitting)   Pulse 61   Temp 98.3 °F (36.8 °C)   Ht 165.1 cm (65\")   Wt 56.5 kg (124 lb 9.6 oz)   SpO2 99%   BMI 20.73 kg/m²   Physical Exam   Constitutional: She is oriented to person, place, and time. Vital signs are normal. She appears well-developed and well-nourished. No distress.   HENT:   Head: Normocephalic.   Cardiovascular: " Normal rate, regular rhythm and normal heart sounds.    Pulmonary/Chest: Effort normal and breath sounds normal.   Neurological: She is alert and oriented to person, place, and time. Gait normal.   Psychiatric: She has a normal mood and affect. Her behavior is normal. Judgment and thought content normal.   Vitals reviewed.      Assessment/Plan   Problem List Items Addressed This Visit        Other    Insomnia        rto in 3 mons     There are no diagnoses linked to this encounter.

## 2018-09-23 DIAGNOSIS — M19.90 ARTHRITIS: ICD-10-CM

## 2018-10-07 DIAGNOSIS — F51.01 PRIMARY INSOMNIA: ICD-10-CM

## 2018-10-08 RX ORDER — ZOLPIDEM TARTRATE 12.5 MG/1
TABLET, FILM COATED, EXTENDED RELEASE ORAL
Qty: 30 TABLET | Refills: 0 | Status: SHIPPED | OUTPATIENT
Start: 2018-10-08 | End: 2018-11-05 | Stop reason: SDUPTHER

## 2018-10-11 ENCOUNTER — OFFICE VISIT (OUTPATIENT)
Dept: FAMILY MEDICINE CLINIC | Facility: CLINIC | Age: 58
End: 2018-10-11

## 2018-10-11 VITALS
BODY MASS INDEX: 20.76 KG/M2 | HEIGHT: 65 IN | WEIGHT: 124.6 LBS | DIASTOLIC BLOOD PRESSURE: 70 MMHG | OXYGEN SATURATION: 98 % | SYSTOLIC BLOOD PRESSURE: 124 MMHG | TEMPERATURE: 98.3 F | HEART RATE: 74 BPM

## 2018-10-11 DIAGNOSIS — S16.1XXA STRAIN OF NECK MUSCLE, INITIAL ENCOUNTER: Primary | ICD-10-CM

## 2018-10-11 PROCEDURE — 99213 OFFICE O/P EST LOW 20 MIN: CPT | Performed by: NURSE PRACTITIONER

## 2018-10-11 RX ORDER — BACLOFEN 20 MG/1
20 TABLET ORAL 3 TIMES DAILY
Qty: 90 TABLET | Refills: 0 | Status: SHIPPED | OUTPATIENT
Start: 2018-10-11 | End: 2019-11-05

## 2018-10-11 NOTE — PROGRESS NOTES
"Subjective   Taty Rosario is a 58 y.o. female.     History of Present Illness   Neck Pain: Swapnilt complains of neck pain. Event that precipitate these symptoms: none known. Onset of symptoms 1 week ago, unchanged since that time. Current symptoms are stiffness in neck. Patient denies numbness in arms. Patient has had previous herniated cervical disc.  Previous treatments include: medication: NSAID: slight relief, history of plateand screws.    The following portions of the patient's history were reviewed and updated as appropriate: allergies, current medications, past social history and problem list.    Review of Systems   Musculoskeletal: Positive for neck pain.   All other systems reviewed and are negative.      Objective   /70 (BP Location: Left arm, Patient Position: Sitting)   Pulse 74   Temp 98.3 °F (36.8 °C)   Ht 165.1 cm (65\")   Wt 56.5 kg (124 lb 9.6 oz)   SpO2 98%   BMI 20.73 kg/m²   Physical Exam   Constitutional: She is oriented to person, place, and time. Vital signs are normal. She appears well-developed and well-nourished. No distress.   HENT:   Head: Normocephalic.   Cardiovascular: Normal rate, regular rhythm and normal heart sounds.    Pulmonary/Chest: Effort normal and breath sounds normal.   Neurological: She is alert and oriented to person, place, and time. Gait normal.   Psychiatric: She has a normal mood and affect. Her behavior is normal. Judgment and thought content normal.   Vitals reviewed.      Assessment/Plan      Diagnosis Plan   1. Strain of neck muscle, initial encounter  baclofen (LIORESAL) 20 MG tablet     rto prn  Heat  stretches  Dennis Andujar, APRN  10/11/2018    "

## 2018-10-24 ENCOUNTER — TELEPHONE (OUTPATIENT)
Dept: FAMILY MEDICINE CLINIC | Facility: CLINIC | Age: 58
End: 2018-10-24

## 2018-10-24 DIAGNOSIS — M54.2 NECK PAIN: Primary | ICD-10-CM

## 2018-10-24 RX ORDER — METHOCARBAMOL 750 MG/1
750 TABLET, FILM COATED ORAL 3 TIMES DAILY PRN
Qty: 30 TABLET | Refills: 0 | Status: SHIPPED | OUTPATIENT
Start: 2018-10-24 | End: 2019-06-11

## 2018-10-24 NOTE — TELEPHONE ENCOUNTER
Prescription sent to pharmacy patient aware    Patient is concerned and wanting to know what else can be done because she has had the neck pain for 4 weeks and it is still bad

## 2018-10-24 NOTE — TELEPHONE ENCOUNTER
Patient left message stating the muscle relaxer nancy given her on her last appointment upsets her stomach she said nancy told her to call if she could not take medication. She said she can tolerate robaxin 750mg and is wondering if nancy will give her a prescription for that?

## 2018-10-25 ENCOUNTER — HOSPITAL ENCOUNTER (OUTPATIENT)
Dept: GENERAL RADIOLOGY | Facility: HOSPITAL | Age: 58
Discharge: HOME | End: 2018-10-25
Admitting: NURSE PRACTITIONER

## 2018-10-25 DIAGNOSIS — M54.2 NECK PAIN: ICD-10-CM

## 2018-10-25 PROCEDURE — 72050 X-RAY EXAM NECK SPINE 4/5VWS: CPT

## 2018-10-29 ENCOUNTER — TELEPHONE (OUTPATIENT)
Dept: FAMILY MEDICINE CLINIC | Facility: CLINIC | Age: 58
End: 2018-10-29

## 2018-10-29 DIAGNOSIS — R93.89 ABNORMAL X-RAY: Primary | ICD-10-CM

## 2018-11-05 ENCOUNTER — TELEPHONE (OUTPATIENT)
Dept: FAMILY MEDICINE CLINIC | Facility: CLINIC | Age: 58
End: 2018-11-05

## 2018-11-05 DIAGNOSIS — F51.01 PRIMARY INSOMNIA: ICD-10-CM

## 2018-11-05 NOTE — TELEPHONE ENCOUNTER
Pt coming in this afternoon for an injection.    Pt requesting samples for nebivolol (BYSTOLIC) 5 MG tablet

## 2018-11-06 RX ORDER — ZOLPIDEM TARTRATE 12.5 MG/1
TABLET, FILM COATED, EXTENDED RELEASE ORAL
Qty: 21 TABLET | Refills: 0 | Status: SHIPPED | OUTPATIENT
Start: 2018-11-06 | End: 2018-11-27 | Stop reason: SDUPTHER

## 2018-11-19 ENCOUNTER — TELEPHONE (OUTPATIENT)
Dept: FAMILY MEDICINE CLINIC | Facility: CLINIC | Age: 58
End: 2018-11-19

## 2018-11-19 DIAGNOSIS — Z12.39 BREAST CANCER SCREENING: Primary | ICD-10-CM

## 2018-11-25 DIAGNOSIS — M19.90 ARTHRITIS: ICD-10-CM

## 2018-11-27 DIAGNOSIS — F51.01 PRIMARY INSOMNIA: ICD-10-CM

## 2018-11-28 ENCOUNTER — OFFICE VISIT (OUTPATIENT)
Dept: ORTHOPEDIC SURGERY | Facility: CLINIC | Age: 58
End: 2018-11-28

## 2018-11-28 ENCOUNTER — CLINICAL SUPPORT (OUTPATIENT)
Dept: FAMILY MEDICINE CLINIC | Facility: CLINIC | Age: 58
End: 2018-11-28

## 2018-11-28 VITALS — TEMPERATURE: 98.6 F | WEIGHT: 123 LBS | BODY MASS INDEX: 20.49 KG/M2 | HEIGHT: 65 IN

## 2018-11-28 DIAGNOSIS — M48.02 CERVICAL SPINAL STENOSIS: Primary | ICD-10-CM

## 2018-11-28 DIAGNOSIS — M47.22 CERVICAL SPONDYLOSIS WITH RADICULOPATHY: ICD-10-CM

## 2018-11-28 DIAGNOSIS — Z23 NEED FOR VACCINATION: Primary | ICD-10-CM

## 2018-11-28 PROCEDURE — 90471 IMMUNIZATION ADMIN: CPT | Performed by: NURSE PRACTITIONER

## 2018-11-28 PROCEDURE — 99203 OFFICE O/P NEW LOW 30 MIN: CPT | Performed by: ORTHOPAEDIC SURGERY

## 2018-11-28 PROCEDURE — 90632 HEPA VACCINE ADULT IM: CPT | Performed by: NURSE PRACTITIONER

## 2018-11-28 RX ORDER — ZOLPIDEM TARTRATE 12.5 MG/1
TABLET, FILM COATED, EXTENDED RELEASE ORAL
Qty: 21 TABLET | Refills: 0 | Status: SHIPPED | OUTPATIENT
Start: 2018-11-28 | End: 2018-12-18 | Stop reason: SDUPTHER

## 2018-11-28 RX ORDER — METHYLPREDNISOLONE 4 MG/1
TABLET ORAL
Qty: 21 TABLET | Refills: 0 | Status: SHIPPED | OUTPATIENT
Start: 2018-11-28 | End: 2019-02-20

## 2018-11-28 NOTE — PROGRESS NOTES
New patient or new problem visit    Chief Complaint   Patient presents with   • Cervical Spine - Pain       HPI: She complains of a 2 month history of neck pain no arm pain.  In 2001 and 2005 underwent anterior cervical discectomy and fusions with good result.  She denies bowel or bladder complaints no balance difficulties lately his tried local topical agents and ibuprofen.  Pain is intermittent stabbing and aching worse with activity.    PFSH: See chart- reviewed    Review of Systems   Constitutional: Negative for chills, fever and unexpected weight change.   HENT: Negative for trouble swallowing and voice change.    Eyes: Negative for visual disturbance.   Respiratory: Negative for cough and shortness of breath.    Cardiovascular: Negative for chest pain and leg swelling.   Gastrointestinal: Negative for abdominal pain, nausea and vomiting.   Endocrine: Negative for cold intolerance and heat intolerance.   Genitourinary: Negative for difficulty urinating, frequency and urgency.   Skin: Negative for rash and wound.   Allergic/Immunologic: Negative for immunocompromised state.   Neurological: Negative for weakness and numbness.   Hematological: Does not bruise/bleed easily.   Psychiatric/Behavioral: Negative for dysphoric mood. The patient is not nervous/anxious.        PE:Constitutional: Vital signs above-noted.  Awake, alert and oriented    Psychiatric: Affect and insight do not appear grossly disturbed.    Pulmonary: Breathing is unlabored, color is good.    Skin: Warm, dry and normal turgor    Cardiac:  radial pulses intact.  No arm edema.    Eyesight and hearing appear adequate for examination purposes    Musculoskeletal:  Posture is unremarkable to coronal and sagittal inspection.  Motion appears undisturbed.  The skin about the area is intact.  There is no palpable or visible deformity.  There is no local spasm. There is him tenderness to percussion and palpation of the spine.     Neurologic:  In the  bilateral upper extremities there is no evidence of atrophy.  Motor function is undisturbed in shoulder abduction, elbow flexion, wrist extension, finger extension, triceps extension, or finger abduction   .  Sensation appears symmetrically intact to light touch   .  Reflexes are 2+ and symmetrical in the biceps, brachioradialis, and triceps. Merrill test is negative.  Gait appears undisturbed.  Spurling test is negative.      MEDICAL DECISION MAKING    XRAY: Plain film x-rays of cervical spine show retrolisthesis at C4-5, disc degeneration at C6 7 and then fused levels in between the 56 level which still has a plate in position.  Both levels were solidly fused.    Other: n/a    Impression: Her vehicle spondylosis with radiculopathy adjacent level degenerative change above and below old C5 to 7 fusions    Plan: I plan MRI for further evaluation she'll try Dosepak for now for pain.

## 2018-12-06 RX ORDER — EVOLOCUMAB 140 MG/ML
INJECTION, SOLUTION SUBCUTANEOUS
Qty: 1 ML | Refills: 11 | Status: SHIPPED | OUTPATIENT
Start: 2018-12-06 | End: 2019-11-18 | Stop reason: SDUPTHER

## 2018-12-14 ENCOUNTER — TELEPHONE (OUTPATIENT)
Dept: ORTHOPEDIC SURGERY | Facility: CLINIC | Age: 58
End: 2018-12-14

## 2018-12-14 DIAGNOSIS — M48.02 CERVICAL SPINAL STENOSIS: ICD-10-CM

## 2018-12-17 ENCOUNTER — TELEPHONE (OUTPATIENT)
Dept: ORTHOPEDIC SURGERY | Facility: CLINIC | Age: 58
End: 2018-12-17

## 2018-12-17 DIAGNOSIS — M54.50 LUMBAR SPINE PAIN: Primary | ICD-10-CM

## 2018-12-17 NOTE — TELEPHONE ENCOUNTER
Notes recorded by Froy Fitzpatrick MD on 12/14/2018 at 11:48 AM EST  Tell her that there is wear and tear change above and below the old fusion, of moderate extent and treatment options include safely living with it for now, epidural steroid injections or even surgery.  Physical therapy is an option as well.  It's not bothering her too much I would continue with more conservative options for now.

## 2018-12-18 DIAGNOSIS — F51.01 PRIMARY INSOMNIA: ICD-10-CM

## 2018-12-19 RX ORDER — ZOLPIDEM TARTRATE 12.5 MG/1
TABLET, FILM COATED, EXTENDED RELEASE ORAL
Qty: 21 TABLET | Refills: 0 | Status: SHIPPED | OUTPATIENT
Start: 2018-12-19 | End: 2019-02-20

## 2018-12-21 ENCOUNTER — TELEPHONE (OUTPATIENT)
Dept: FAMILY MEDICINE CLINIC | Facility: CLINIC | Age: 58
End: 2018-12-21

## 2018-12-21 DIAGNOSIS — M48.02 CERVICAL STENOSIS OF SPINE: Primary | ICD-10-CM

## 2018-12-21 NOTE — TELEPHONE ENCOUNTER
Pt is requesting a referral for Neurology. She would like to be referred to Dr. Jorge Wisdom, at [facility]. Pt mentioned to send her most recent MRI results to them as well.     Phone   Fax

## 2019-01-05 DIAGNOSIS — F51.01 PRIMARY INSOMNIA: ICD-10-CM

## 2019-01-05 DIAGNOSIS — M19.90 ARTHRITIS: ICD-10-CM

## 2019-01-07 RX ORDER — ZOLPIDEM TARTRATE 12.5 MG/1
TABLET, FILM COATED, EXTENDED RELEASE ORAL
Qty: 21 TABLET | Refills: 0 | Status: SHIPPED | OUTPATIENT
Start: 2019-01-07 | End: 2019-01-31 | Stop reason: SDUPTHER

## 2019-01-29 DIAGNOSIS — F51.01 PRIMARY INSOMNIA: ICD-10-CM

## 2019-01-30 RX ORDER — ZOLPIDEM TARTRATE 12.5 MG/1
TABLET, FILM COATED, EXTENDED RELEASE ORAL
Qty: 21 TABLET | Refills: 0 | OUTPATIENT
Start: 2019-01-30

## 2019-01-31 DIAGNOSIS — F51.01 PRIMARY INSOMNIA: ICD-10-CM

## 2019-01-31 RX ORDER — ZOLPIDEM TARTRATE 12.5 MG/1
12.5 TABLET, FILM COATED, EXTENDED RELEASE ORAL NIGHTLY PRN
Qty: 90 TABLET | Refills: 0 | Status: SHIPPED | OUTPATIENT
Start: 2019-01-31 | End: 2019-04-29 | Stop reason: SDUPTHER

## 2019-01-31 NOTE — TELEPHONE ENCOUNTER
Pt made appt for 2/20, 1st available she can do. She is out of her Ambien and is requesting a refill, enough to get her through to her appt. Please advise  [facility] [place]

## 2019-02-04 DIAGNOSIS — M19.90 ARTHRITIS: ICD-10-CM

## 2019-02-20 ENCOUNTER — OFFICE VISIT (OUTPATIENT)
Dept: FAMILY MEDICINE CLINIC | Facility: CLINIC | Age: 59
End: 2019-02-20

## 2019-02-20 VITALS
HEART RATE: 101 BPM | WEIGHT: 128 LBS | OXYGEN SATURATION: 97 % | SYSTOLIC BLOOD PRESSURE: 110 MMHG | TEMPERATURE: 99.6 F | HEIGHT: 65 IN | BODY MASS INDEX: 21.33 KG/M2 | DIASTOLIC BLOOD PRESSURE: 70 MMHG

## 2019-02-20 DIAGNOSIS — Z79.899 HIGH RISK MEDICATION USE: ICD-10-CM

## 2019-02-20 DIAGNOSIS — F51.01 PRIMARY INSOMNIA: Primary | ICD-10-CM

## 2019-02-20 DIAGNOSIS — I10 ESSENTIAL HYPERTENSION: ICD-10-CM

## 2019-02-20 LAB
POC AMPHETAMINES: NEGATIVE
POC BARBITURATES: NEGATIVE
POC BENZODIAZEPHINES: NEGATIVE
POC COCAINE: NEGATIVE
POC METHADONE: NEGATIVE
POC METHAMPHETAMINE SCREEN URINE: NEGATIVE
POC OPIATES: NEGATIVE
POC OXYCODONE: NEGATIVE
POC PHENCYCLIDINE: NEGATIVE
POC PROPOXYPHENE: NEGATIVE
POC THC: NEGATIVE
POC TRICYCLIC ANTIDEPRESSANTS: NEGATIVE

## 2019-02-20 PROCEDURE — 80305 DRUG TEST PRSMV DIR OPT OBS: CPT | Performed by: NURSE PRACTITIONER

## 2019-02-20 PROCEDURE — 99213 OFFICE O/P EST LOW 20 MIN: CPT | Performed by: NURSE PRACTITIONER

## 2019-02-20 RX ORDER — PREDNISOLONE ACETATE 10 MG/ML
SUSPENSION/ DROPS OPHTHALMIC
Refills: 0 | COMMUNITY
Start: 2019-01-23 | End: 2019-11-05

## 2019-02-20 RX ORDER — NEBIVOLOL 5 MG/1
5 TABLET ORAL DAILY
Qty: 90 TABLET | Refills: 3 | Status: SHIPPED | OUTPATIENT
Start: 2019-02-20 | End: 2021-01-18 | Stop reason: SDUPTHER

## 2019-02-20 RX ORDER — CYCLOBENZAPRINE HCL 5 MG
5 TABLET ORAL 3 TIMES DAILY
Refills: 1 | COMMUNITY
Start: 2019-01-22 | End: 2019-11-05 | Stop reason: SDUPTHER

## 2019-02-20 NOTE — PROGRESS NOTES
"Subjective   Taty Rosario is a 58 y.o. female.     History of Present Illness   Taty Rosario 58 y.o. female presents for follow up of insomnia with onset of symptoms years ago. Patient describes symptoms as frequent night time awakening and difficulty falling asleep. Patient has found complete relief with Ambien (Zolpidem). Associated symptoms include: fatigue if unable to take Rx . Patient denies daytime somnolence Symptoms have stabilized.  The patient has failed multiple OTC medications for insomnia.  They are well controlled on current Rx and will continue to try to take Rx PRN.  She will use the lowest effective dose.  The patient has read and signed the [facility] Controlled Substance Contract.  I will continue to see patient for regular follow up appointments and be prescribed the lowest effective dose.  CALEB has been reviewed by me and is updated every 3 months. The patient is aware of the potential for addiction and dependence.  She denies that Ambien (Zolpidem) causes excessive daytime drowsiness and sleep walking.  Patient voices understanding to take Ambien (Zolpidem) and go straight to bed. Patient must be able to sleep 7 hours or more when taking this.  Patient will hold Rx and contact me if they experience any impaired mental alertness the next day.        The following portions of the patient's history were reviewed and updated as appropriate: allergies, current medications, past social history and problem list.    Review of Systems   All other systems reviewed and are negative.      Objective   /70 (BP Location: Left arm, Patient Position: Sitting)   Pulse 101   Temp 99.6 °F (37.6 °C)   Ht 165.1 cm (65\")   Wt 58.1 kg (128 lb)   SpO2 97%   BMI 21.30 kg/m²   Physical Exam   Constitutional: She is oriented to person, place, and time. Vital signs are normal. She appears well-developed and well-nourished. No distress.   HENT:   Head: Normocephalic.   Cardiovascular: Normal " rate, regular rhythm and normal heart sounds.   Pulmonary/Chest: Effort normal and breath sounds normal.   Neurological: She is alert and oriented to person, place, and time. Gait normal.   Psychiatric: She has a normal mood and affect. Her behavior is normal. Judgment and thought content normal.   Vitals reviewed.      Assessment/Plan      Diagnosis Plan   1. Primary insomnia     2. High risk medication use  POC Urine Drug Screen, Triage   3. Essential hypertension  nebivolol (BYSTOLIC) 5 MG tablet     Cont same with med  rto in 3 linda Andujar, APRN  2/20/2019

## 2019-03-09 DIAGNOSIS — M19.90 ARTHRITIS: ICD-10-CM

## 2019-04-08 DIAGNOSIS — M19.90 ARTHRITIS: ICD-10-CM

## 2019-04-29 DIAGNOSIS — F51.01 PRIMARY INSOMNIA: ICD-10-CM

## 2019-04-30 RX ORDER — ZOLPIDEM TARTRATE 12.5 MG/1
TABLET, FILM COATED, EXTENDED RELEASE ORAL
Qty: 24 TABLET | Refills: 0 | Status: SHIPPED | OUTPATIENT
Start: 2019-04-30 | End: 2019-05-25 | Stop reason: SDUPTHER

## 2019-05-08 DIAGNOSIS — M19.90 ARTHRITIS: ICD-10-CM

## 2019-05-25 DIAGNOSIS — F51.01 PRIMARY INSOMNIA: ICD-10-CM

## 2019-05-28 ENCOUNTER — TELEPHONE (OUTPATIENT)
Dept: FAMILY MEDICINE CLINIC | Facility: CLINIC | Age: 59
End: 2019-05-28

## 2019-05-28 NOTE — TELEPHONE ENCOUNTER
Pt requesting zolpidem CR (AMBIEN CR) 12.5 MG CR tablet. I informed pt that Dennis will be back in the office tomorrow.

## 2019-05-29 RX ORDER — ZOLPIDEM TARTRATE 12.5 MG/1
TABLET, FILM COATED, EXTENDED RELEASE ORAL
Qty: 3 TABLET | Refills: 0 | Status: SHIPPED | OUTPATIENT
Start: 2019-05-29 | End: 2019-06-11 | Stop reason: SDUPTHER

## 2019-06-09 DIAGNOSIS — M19.90 ARTHRITIS: ICD-10-CM

## 2019-06-11 ENCOUNTER — OFFICE VISIT (OUTPATIENT)
Dept: FAMILY MEDICINE CLINIC | Facility: CLINIC | Age: 59
End: 2019-06-11

## 2019-06-11 VITALS
HEART RATE: 68 BPM | DIASTOLIC BLOOD PRESSURE: 62 MMHG | WEIGHT: 122 LBS | HEIGHT: 65 IN | OXYGEN SATURATION: 97 % | BODY MASS INDEX: 20.33 KG/M2 | TEMPERATURE: 98.6 F | RESPIRATION RATE: 14 BRPM | SYSTOLIC BLOOD PRESSURE: 116 MMHG

## 2019-06-11 DIAGNOSIS — Z79.899 HIGH RISK MEDICATION USE: Primary | ICD-10-CM

## 2019-06-11 DIAGNOSIS — E78.5 HYPERLIPIDEMIA, UNSPECIFIED HYPERLIPIDEMIA TYPE: ICD-10-CM

## 2019-06-11 DIAGNOSIS — F51.01 PRIMARY INSOMNIA: ICD-10-CM

## 2019-06-11 DIAGNOSIS — Z13.0 SCREENING FOR IRON DEFICIENCY ANEMIA: ICD-10-CM

## 2019-06-11 DIAGNOSIS — I10 ESSENTIAL HYPERTENSION: ICD-10-CM

## 2019-06-11 DIAGNOSIS — Z91.09 ENVIRONMENTAL ALLERGIES: ICD-10-CM

## 2019-06-11 PROBLEM — Z00.00 ROUTINE GENERAL MEDICAL EXAMINATION AT HEALTH CARE FACILITY: Status: RESOLVED | Noted: 2017-08-07 | Resolved: 2019-06-11

## 2019-06-11 PROBLEM — R35.0 INCREASED FREQUENCY OF URINATION: Status: RESOLVED | Noted: 2018-03-12 | Resolved: 2019-06-11

## 2019-06-11 PROBLEM — R39.191: Status: RESOLVED | Noted: 2018-03-12 | Resolved: 2019-06-11

## 2019-06-11 PROBLEM — S16.1XXA CERVICAL STRAIN: Status: RESOLVED | Noted: 2018-10-11 | Resolved: 2019-06-11

## 2019-06-11 PROBLEM — Z95.5 HISTORY OF CORONARY ARTERY STENT PLACEMENT: Status: RESOLVED | Noted: 2018-06-28 | Resolved: 2019-06-11

## 2019-06-11 PROBLEM — Z12.39 ENCOUNTER FOR SCREENING BREAST EXAMINATION: Status: RESOLVED | Noted: 2018-06-15 | Resolved: 2019-06-11

## 2019-06-11 PROCEDURE — 99214 OFFICE O/P EST MOD 30 MIN: CPT | Performed by: NURSE PRACTITIONER

## 2019-06-11 PROCEDURE — 80305 DRUG TEST PRSMV DIR OPT OBS: CPT | Performed by: NURSE PRACTITIONER

## 2019-06-11 RX ORDER — PREDNISONE 20 MG/1
TABLET ORAL
Qty: 15 TABLET | Refills: 0 | Status: SHIPPED | OUTPATIENT
Start: 2019-06-11 | End: 2019-11-05

## 2019-06-11 RX ORDER — ZOLPIDEM TARTRATE 12.5 MG/1
12.5 TABLET, FILM COATED, EXTENDED RELEASE ORAL NIGHTLY PRN
Qty: 30 TABLET | Refills: 2 | Status: SHIPPED | OUTPATIENT
Start: 2019-06-11 | End: 2019-10-14 | Stop reason: SDUPTHER

## 2019-06-11 NOTE — PROGRESS NOTES
Subjective   Taty Rosario is a 58 y.o. female.     History of Present Illness   Taty Rosario 58 y.o. female who presents today for routine follow up check and medication refills.  she has a history of   Patient Active Problem List   Diagnosis   • Insomnia   • Hot flushes, perimenopausal   • Arthritis   • Coronary artery disease involving native coronary artery   • Anxiety   • Human papilloma virus (HPV) infection   • Cystocele   • Hypertension   • Prolapse of vaginal vault after hysterectomy   • Hyperlipidemia   • Environmental allergies   • Mixed stress and urge urinary incontinence   .  Since the last visit, she has overall felt well.  She has Hypertenision and is well controlled on medication and lipds needing rechecked, insomnia is well controlled, allergies are bad right now.  using meds as directed but fluid on ears and drainage..  she has been compliant with current medications have reviewed them.  The patient denies medication side effects.    Results for orders placed or performed in visit on 02/20/19   POC Urine Drug Screen, Triage   Result Value Ref Range    Methamphetaine Screen, Urine Negative Negative    POC Amphetamines Negative Negative    Barbiturates Screen Negative Negative    Benzodiazepine Screen Negative Negative    Cocaine Screen Negative Negative    Methadone Screen Negative Negative    Opiate Screen Negative Negative    Oxycodone, Screen Negative Negative    Phencyclidine (PCP) Screen Negative Negative    Propoxyphene Screen Negative Negative    THC, Screen Negative Negative    Tricyclic Antidepressants Screen Negative Negative         The following portions of the patient's history were reviewed and updated as appropriate: allergies, current medications, past social history and problem list.    Review of Systems   HENT: Positive for congestion.    Respiratory: Positive for cough.    All other systems reviewed and are negative.      Objective   /62   Pulse 68   Temp 98.6 °F  "(37 °C) (Oral)   Resp 14   Ht 165.1 cm (65\")   Wt 55.3 kg (122 lb)   SpO2 97%   BMI 20.30 kg/m²   Physical Exam   Constitutional: She is oriented to person, place, and time. Vital signs are normal. She appears well-developed and well-nourished. No distress.   HENT:   Head: Normocephalic.   Cardiovascular: Normal rate, regular rhythm and normal heart sounds.   Pulmonary/Chest: Effort normal and breath sounds normal.   Neurological: She is alert and oriented to person, place, and time. Gait normal.   Psychiatric: She has a normal mood and affect. Her behavior is normal. Judgment and thought content normal.   Vitals reviewed.      Assessment/Plan      Diagnosis Plan   1. High risk medication use  POC Urine Drug Screen, Triage   2. Primary insomnia  zolpidem CR (AMBIEN CR) 12.5 MG CR tablet   3. Essential hypertension     4. Hyperlipidemia, unspecified hyperlipidemia type  Comprehensive Metabolic Panel    Lipid Panel With LDL / HDL Ratio   5. Environmental allergies  predniSONE (DELTASONE) 20 MG tablet   6. Screening for iron deficiency anemia  CBC & Differential     Cont same with meds  rto in 3 mons   Follow up after labs   Dennis Andujar, APRN  6/11/2019  "

## 2019-06-12 LAB
ALBUMIN SERPL-MCNC: 4.4 G/DL (ref 3.5–5.2)
ALBUMIN/GLOB SERPL: 1.8 G/DL
ALP SERPL-CCNC: 155 U/L (ref 39–117)
ALT SERPL-CCNC: 16 U/L (ref 1–33)
AST SERPL-CCNC: 20 U/L (ref 1–32)
BASOPHILS # BLD AUTO: 0.06 10*3/MM3 (ref 0–0.2)
BASOPHILS NFR BLD AUTO: 1.2 % (ref 0–1.5)
BILIRUB SERPL-MCNC: 0.3 MG/DL (ref 0.2–1.2)
BUN SERPL-MCNC: 16 MG/DL (ref 6–20)
BUN/CREAT SERPL: 19.8 (ref 7–25)
CALCIUM SERPL-MCNC: 9.5 MG/DL (ref 8.6–10.5)
CHLORIDE SERPL-SCNC: 103 MMOL/L (ref 98–107)
CHOLEST SERPL-MCNC: 155 MG/DL (ref 0–200)
CO2 SERPL-SCNC: 30.8 MMOL/L (ref 22–29)
CREAT SERPL-MCNC: 0.81 MG/DL (ref 0.57–1)
EOSINOPHIL # BLD AUTO: 0.13 10*3/MM3 (ref 0–0.4)
EOSINOPHIL NFR BLD AUTO: 2.6 % (ref 0.3–6.2)
ERYTHROCYTE [DISTWIDTH] IN BLOOD BY AUTOMATED COUNT: 13 % (ref 12.3–15.4)
GLOBULIN SER CALC-MCNC: 2.5 GM/DL
GLUCOSE SERPL-MCNC: 94 MG/DL (ref 65–99)
HCT VFR BLD AUTO: 42.4 % (ref 34–46.6)
HDLC SERPL-MCNC: 93 MG/DL (ref 40–60)
HGB BLD-MCNC: 12.9 G/DL (ref 12–15.9)
IMM GRANULOCYTES # BLD AUTO: 0.02 10*3/MM3 (ref 0–0.05)
IMM GRANULOCYTES NFR BLD AUTO: 0.4 % (ref 0–0.5)
LDLC SERPL CALC-MCNC: 50 MG/DL (ref 0–100)
LDLC/HDLC SERPL: 0.54 {RATIO}
LYMPHOCYTES # BLD AUTO: 1.35 10*3/MM3 (ref 0.7–3.1)
LYMPHOCYTES NFR BLD AUTO: 27.3 % (ref 19.6–45.3)
MCH RBC QN AUTO: 30.1 PG (ref 26.6–33)
MCHC RBC AUTO-ENTMCNC: 30.4 G/DL (ref 31.5–35.7)
MCV RBC AUTO: 98.8 FL (ref 79–97)
MONOCYTES # BLD AUTO: 0.43 10*3/MM3 (ref 0.1–0.9)
MONOCYTES NFR BLD AUTO: 8.7 % (ref 5–12)
NEUTROPHILS # BLD AUTO: 2.95 10*3/MM3 (ref 1.7–7)
NEUTROPHILS NFR BLD AUTO: 59.8 % (ref 42.7–76)
NRBC BLD AUTO-RTO: 0 /100 WBC (ref 0–0.2)
PLATELET # BLD AUTO: 323 10*3/MM3 (ref 140–450)
POTASSIUM SERPL-SCNC: 4.4 MMOL/L (ref 3.5–5.2)
PROT SERPL-MCNC: 6.9 G/DL (ref 6–8.5)
RBC # BLD AUTO: 4.29 10*6/MM3 (ref 3.77–5.28)
SODIUM SERPL-SCNC: 144 MMOL/L (ref 136–145)
TRIGL SERPL-MCNC: 61 MG/DL (ref 0–150)
VLDLC SERPL CALC-MCNC: 12.2 MG/DL
WBC # BLD AUTO: 4.94 10*3/MM3 (ref 3.4–10.8)

## 2019-07-14 DIAGNOSIS — M19.90 ARTHRITIS: ICD-10-CM

## 2019-10-14 DIAGNOSIS — F51.01 PRIMARY INSOMNIA: ICD-10-CM

## 2019-10-15 DIAGNOSIS — F51.01 PRIMARY INSOMNIA: ICD-10-CM

## 2019-10-15 RX ORDER — ZOLPIDEM TARTRATE 12.5 MG/1
TABLET, FILM COATED, EXTENDED RELEASE ORAL
Qty: 30 TABLET | Refills: 1 | Status: SHIPPED | OUTPATIENT
Start: 2019-10-15 | End: 2019-11-05 | Stop reason: SDUPTHER

## 2019-10-15 RX ORDER — ZOLPIDEM TARTRATE 12.5 MG/1
12.5 TABLET, FILM COATED, EXTENDED RELEASE ORAL NIGHTLY PRN
Qty: 30 TABLET | Refills: 2 | Status: SHIPPED | OUTPATIENT
Start: 2019-10-15 | End: 2019-11-05

## 2019-11-05 ENCOUNTER — OFFICE VISIT (OUTPATIENT)
Dept: CARDIOLOGY | Facility: CLINIC | Age: 59
End: 2019-11-05

## 2019-11-05 ENCOUNTER — OFFICE VISIT (OUTPATIENT)
Dept: FAMILY MEDICINE CLINIC | Facility: CLINIC | Age: 59
End: 2019-11-05

## 2019-11-05 VITALS
HEART RATE: 85 BPM | DIASTOLIC BLOOD PRESSURE: 76 MMHG | OXYGEN SATURATION: 96 % | BODY MASS INDEX: 25.05 KG/M2 | SYSTOLIC BLOOD PRESSURE: 116 MMHG | HEIGHT: 60 IN | RESPIRATION RATE: 18 BRPM | WEIGHT: 127.6 LBS

## 2019-11-05 VITALS
OXYGEN SATURATION: 98 % | TEMPERATURE: 98.3 F | WEIGHT: 126.8 LBS | BODY MASS INDEX: 24.9 KG/M2 | DIASTOLIC BLOOD PRESSURE: 68 MMHG | HEART RATE: 87 BPM | HEIGHT: 60 IN | SYSTOLIC BLOOD PRESSURE: 118 MMHG

## 2019-11-05 DIAGNOSIS — I25.10 CORONARY ARTERY DISEASE INVOLVING NATIVE CORONARY ARTERY OF NATIVE HEART WITHOUT ANGINA PECTORIS: Primary | ICD-10-CM

## 2019-11-05 DIAGNOSIS — M19.90 ARTHRITIS: Primary | ICD-10-CM

## 2019-11-05 DIAGNOSIS — F51.01 PRIMARY INSOMNIA: ICD-10-CM

## 2019-11-05 DIAGNOSIS — E78.5 HYPERLIPIDEMIA, UNSPECIFIED HYPERLIPIDEMIA TYPE: ICD-10-CM

## 2019-11-05 PROCEDURE — 99214 OFFICE O/P EST MOD 30 MIN: CPT | Performed by: NURSE PRACTITIONER

## 2019-11-05 PROCEDURE — 93000 ELECTROCARDIOGRAM COMPLETE: CPT | Performed by: NURSE PRACTITIONER

## 2019-11-05 PROCEDURE — 99213 OFFICE O/P EST LOW 20 MIN: CPT | Performed by: NURSE PRACTITIONER

## 2019-11-05 RX ORDER — ZOLPIDEM TARTRATE 12.5 MG/1
12.5 TABLET, FILM COATED, EXTENDED RELEASE ORAL NIGHTLY PRN
Qty: 30 TABLET | Refills: 2 | Status: SHIPPED | OUTPATIENT
Start: 2019-11-05 | End: 2020-01-27 | Stop reason: SDUPTHER

## 2019-11-05 RX ORDER — CYCLOBENZAPRINE HCL 5 MG
5 TABLET ORAL 3 TIMES DAILY
Qty: 90 TABLET | Refills: 2 | Status: SHIPPED | OUTPATIENT
Start: 2019-11-05 | End: 2020-10-14

## 2019-11-05 NOTE — PROGRESS NOTES
Date of Office Visit: 2019  Encounter Provider: YEHUDA Naik  Place of Service: [facility] CARDI[facility]  Patient Name: Taty Garcia  :1960    Chief Complaint   Patient presents with   • Coronary Artery Disease     1 YR FOLLOW UP   :     HPI: Taty Garcia is a 59 y.o. female, new to me, who presents today for follow-up.  Old records have been obtained and reviewed by me.  She is a patient of Dr. Anthony with a past cardiac history significant for coronary artery disease.  In , she had drug-eluting stent placement to her mid LAD.   In May 2017, underwent a treadmill stress test and was able to exercise for 10-1/2 minutes with excellent exercise capacity and no ECG changes.  She was last seen in the office by Allyson Alegria on 2018 at which time she was doing well with no complaints of angina or heart failure.  No changes were made to her medical regimen, and she is here today for follow-up.   Over the past year, she has been doing well from a cardiac standpoint.  She denies any chest pain, shortness of breath, palpitations, edema, dizziness, or syncope.  She stays very active.  She used to play tennis but unfortunately has had to give that up due to trouble with her neck.  Now she mainly sticks to walking.  She is a  at [employer].    She eats a relatively healthy diet.    Past Medical History:   Diagnosis Date   • Allergic     bronchitis, sinustitis   • Anxiety    • Arthritis    • Cervical strain 10/11/2018   • Cervicalgia    • Chest pain    • Coronary artery disease    • Depression    • History of coronary artery stent placement 2018   • Hyperlipidemia 3/29/2017   • Hypertension    • Insomnia    • SOB (shortness of breath)    • Tendonitis    • UTI (urinary tract infection)        Past Surgical History:   Procedure Laterality Date   • BREAST SURGERY      cosmetic procedures   • CARDIAC CATHETERIZATION     • CARDIAC  CATHETERIZATION     • HYSTERECTOMY     • NECK SURGERY     • PARTIAL HYSTERECTOMY     • SPINE SURGERY      spinal fusion of cervical region       Social History     Socioeconomic History   • Marital status:      Spouse name: Not on file   • Number of children: Not on file   • Years of education: Not on file   • Highest education level: Not on file   Tobacco Use   • Smoking status: Never Smoker   • Smokeless tobacco: Never Used   • Tobacco comment: CAFFEINE USE- 2 GLASSES TEA DAILY   Substance and Sexual Activity   • Alcohol use: Yes     Alcohol/week: 1.2 oz     Types: 1 Glasses of wine, 1 Shots of liquor per week     Comment: occ   • Drug use: No   • Sexual activity: Defer       Family History   Problem Relation Age of Onset   • Cancer Mother         breast cancer   • Arthritis Mother    • Stroke Mother    • Heart disease Father        Review of Systems   Constitution: Negative for chills, fever and malaise/fatigue.   Cardiovascular: Negative for chest pain, dyspnea on exertion, leg swelling, near-syncope, orthopnea, palpitations, paroxysmal nocturnal dyspnea and syncope.   Respiratory: Negative for cough and shortness of breath.    Musculoskeletal: Negative for joint pain, joint swelling and myalgias.   Gastrointestinal: Negative for abdominal pain, diarrhea, melena, nausea and vomiting.   Genitourinary: Negative for frequency and hematuria.   Neurological: Negative for light-headedness, numbness, paresthesias and seizures.   Allergic/Immunologic: Negative.    All other systems reviewed and are negative.      Allergies   Allergen Reactions   • Codeine Itching   • Lipitor [Atorvastatin] Myalgia     Other reaction(s): Myalgia   • Oxycodone-Acetaminophen Itching   • Pravachol [Pravastatin Sodium] Myalgia   • Pravastatin Myalgia     Other reaction(s): Myalgia         Current Outpatient Medications:   •  aspirin 81 MG EC tablet, Take 81 mg by mouth Daily., Disp: , Rfl:   •  Biotin 1 MG capsule, Take 1 tablet by  "mouth Daily., Disp: , Rfl:   •  cetirizine (zyrTEC) 10 MG tablet, Take 10 mg by mouth Daily., Disp: , Rfl:   •  Cholecalciferol (VITAMIN D-3) 1000 UNITS capsule, Take 1,000 Units by mouth Daily., Disp: , Rfl:   •  cyclobenzaprine (FLEXERIL) 5 MG tablet, Take 1 tablet by mouth 3 (Three) Times a Day., Disp: 90 tablet, Rfl: 2  •  diclofenac (VOLTAREN) 50 MG EC tablet, TAKE 1 TABLET BY MOUTH TWO TIMES A DAY , Disp: 60 tablet, Rfl: 11  •  Multiple Minerals-Vitamins (CALCIUM CITRATE +) tablet, Take 1 tablet by mouth Daily., Disp: , Rfl:   •  nebivolol (BYSTOLIC) 5 MG tablet, Take 1 tablet by mouth Daily. Takes three times a week, Disp: 90 tablet, Rfl: 3  •  REPATHA SURECLICK 140 MG/ML solution auto-injector, INJECT 140 MG UNDER THE SKIN EVERY 14 (FOURTEEN) DAYS., Disp: 1 mL, Rfl: 11  •  RESTASIS 0.05 % ophthalmic emulsion, Administer 1 drop to both eyes Every 12 (Twelve) Hours., Disp: , Rfl:   •  zolpidem CR (AMBIEN CR) 12.5 MG CR tablet, Take 1 tablet by mouth At Night As Needed for Sleep., Disp: 30 tablet, Rfl: 2      Objective:     Vitals:    11/05/19 1124 11/05/19 1134   BP: 116/76 116/76   BP Location: Right arm Left arm   Patient Position: Sitting Sitting   Pulse: 85    Resp: 18    SpO2: 96%    Weight: 57.9 kg (127 lb 9.6 oz)    Height: 152.4 cm (60\")      Body mass index is 24.92 kg/m².    PHYSICAL EXAM:    Physical Exam   Constitutional: She is oriented to person, place, and time. She appears well-developed and well-nourished. No distress.   HENT:   Head: Normocephalic and atraumatic.   Eyes: Pupils are equal, round, and reactive to light.   Neck: No JVD present. No thyromegaly present.   Cardiovascular: Normal rate, regular rhythm, normal heart sounds and intact distal pulses.   No murmur heard.  Pulmonary/Chest: Effort normal and breath sounds normal. No respiratory distress.   Abdominal: Soft. Bowel sounds are normal. She exhibits no distension. There is no splenomegaly or hepatomegaly. There is no tenderness. "   Musculoskeletal: Normal range of motion. She exhibits no edema.   Neurological: She is alert and oriented to person, place, and time.   Skin: Skin is warm and dry. She is not diaphoretic. No erythema.   Psychiatric: She has a normal mood and affect. Her behavior is normal. Judgment normal.         ECG 12 Lead  Date/Time: 11/5/2019 11:41 AM  Performed by: Cyndy Rojas APRN  Authorized by: Cyndy Rojas APRN   Comparison: compared with previous ECG from 6/28/2018  Similar to previous ECG  Rhythm: sinus rhythm  Rate: normal  BPM: 70  T inversion: aVL    Clinical impression: normal ECG  Comments: Indication: CAD              Assessment:       Diagnosis Plan   1. Coronary artery disease involving native coronary artery of native heart without angina pectoris  ECG 12 Lead   2. Hyperlipidemia, unspecified hyperlipidemia type       Orders Placed This Encounter   Procedures   • ECG 12 Lead     This order was created via procedure documentation          Plan:       1.  Coronary artery disease.  She denies any symptoms of angina.  She has excellent risk factor modification.  Her EKG looks great.  Continue current regimen      2.  Hyperlipidemia.  Her last lipid panel was in June of this year which revealed an LDL of 50 and an HDL of 93.  She remains on Repatha.      Overall think she is doing really well from a cardiac standpoint.  I am not going to make any changes to her medical regimen, and she will follow-up with Dr. Martin in 1 year or sooner if needed.      As always, it has been a pleasure to participate in your patient's care.      Sincerely,         YEHUDA Dougherty

## 2019-11-05 NOTE — PROGRESS NOTES
Subjective   Taty Garcia is a 59 y.o. female.     History of Present Illness   Taty Garcia 59 y.o. female presents for follow up of insomnia with onset of symptoms years ago. Patient describes symptoms as early morning awakening, frequent night time awakening and difficulty falling asleep. Patient has found complete relief with Ambien (Zolpidem). Associated symptoms include: fatigue if unable to take Rx . Patient denies daytime somnolence Symptoms have stabilized.  The patient has failed multiple OTC medications for insomnia.  They are well controlled on current Rx and will continue to try to take Rx PRN.  She will use the lowest effective dose.  The patient has read and signed the [facility] Controlled Substance Contract.  I will continue to see patient for regular follow up appointments and be prescribed the lowest effective dose.  CALEB has been reviewed by me and is updated every 3 months. The patient is aware of the potential for addiction and dependence.  She denies that Ambien (Zolpidem) causes excessive daytime drowsiness and sleep walking.  Patient voices understanding to take Ambien (Zolpidem) and go straight to bed. Patient must be able to sleep 7 hours or more when taking this.  Patient will hold Rx and contact me if they experience any impaired mental alertness the next day.      Also requesting for the Flexeril to be refilled for her arthritis paina nd neck pain.  Works well without any side effects.    The following portions of the patient's history were reviewed and updated as appropriate: allergies, current medications, past social history and problem list.    Review of Systems   Constitutional: Negative for fever.   Respiratory: Negative for cough and shortness of breath.    Cardiovascular: Negative for chest pain.   Gastrointestinal: Negative for abdominal pain.   Neurological: Negative for dizziness.       Objective   /68 (BP Location: Left arm, Patient Position: Sitting)    "Pulse 87   Temp 98.3 °F (36.8 °C)   Ht 152.4 cm (60\")   Wt 57.5 kg (126 lb 12.8 oz)   SpO2 98%   BMI 24.76 kg/m²   Physical Exam   Constitutional: She is oriented to person, place, and time. Vital signs are normal. She appears well-developed and well-nourished. No distress.   HENT:   Head: Normocephalic.   Cardiovascular: Normal rate, regular rhythm and normal heart sounds.   Pulmonary/Chest: Effort normal and breath sounds normal.   Neurological: She is alert and oriented to person, place, and time. Gait normal.   Psychiatric: She has a normal mood and affect. Her behavior is normal. Judgment and thought content normal.   Vitals reviewed.    The patient has read and signed the [facility] Controlled Substance Contract.  I will continue to see patient for regular follow up appointments.  They are well controlled on their medication.  CALEB has been reviewed by me and is updated every 3 months. The patient is aware of the potential for addiction and dependence.    Assessment/Plan      Diagnosis Plan   1. Arthritis  cyclobenzaprine (FLEXERIL) 5 MG tablet   2. Primary insomnia  zolpidem CR (AMBIEN CR) 12.5 MG CR tablet     rto in 3 linda Andujar, APRN  11/5/2019  "

## 2019-11-18 ENCOUNTER — TELEPHONE (OUTPATIENT)
Dept: FAMILY MEDICINE CLINIC | Facility: CLINIC | Age: 59
End: 2019-11-18

## 2019-11-18 RX ORDER — EVOLOCUMAB 140 MG/ML
140 INJECTION, SOLUTION SUBCUTANEOUS
Qty: 1 ML | Refills: 11 | Status: SHIPPED | OUTPATIENT
Start: 2019-11-18 | End: 2020-06-11 | Stop reason: CLARIF

## 2019-11-18 NOTE — TELEPHONE ENCOUNTER
Patient requesting REPATHA SURECLICK 140 MG/ML solution auto-injector refill to [facility].

## 2019-11-20 ENCOUNTER — TELEPHONE (OUTPATIENT)
Dept: FAMILY MEDICINE CLINIC | Facility: CLINIC | Age: 59
End: 2019-11-20

## 2019-11-20 NOTE — TELEPHONE ENCOUNTER
Angela with [facility] called. She is needing Enedina to call her back regarding the Repatha rx. She needs clarification.    Phone [phone]

## 2019-12-27 DIAGNOSIS — Z12.39 BREAST CANCER SCREENING: Primary | ICD-10-CM

## 2020-01-20 DIAGNOSIS — F51.01 PRIMARY INSOMNIA: ICD-10-CM

## 2020-01-20 NOTE — TELEPHONE ENCOUNTER
Pt is not due till February for her three month Ambien refill. Pt states that the pharmacy denied her filling request? I informed pt that it is probably to early to request refill?

## 2020-01-24 RX ORDER — ZOLPIDEM TARTRATE 12.5 MG/1
12.5 TABLET, FILM COATED, EXTENDED RELEASE ORAL NIGHTLY PRN
Qty: 30 TABLET | Refills: 2 | Status: CANCELLED | OUTPATIENT
Start: 2020-01-24

## 2020-01-27 RX ORDER — ZOLPIDEM TARTRATE 12.5 MG/1
12.5 TABLET, FILM COATED, EXTENDED RELEASE ORAL NIGHTLY PRN
Qty: 30 TABLET | Refills: 0 | Status: SHIPPED | OUTPATIENT
Start: 2020-01-27 | End: 2020-02-14 | Stop reason: SDUPTHER

## 2020-01-27 NOTE — TELEPHONE ENCOUNTER
appt is now scheduled for 2/14 for zolpidem review. Patient has about two tablets remaining and is curious if a review can be authorized?

## 2020-02-14 ENCOUNTER — OFFICE VISIT (OUTPATIENT)
Dept: FAMILY MEDICINE CLINIC | Facility: CLINIC | Age: 60
End: 2020-02-14

## 2020-02-14 VITALS
BODY MASS INDEX: 25.52 KG/M2 | HEART RATE: 62 BPM | SYSTOLIC BLOOD PRESSURE: 110 MMHG | TEMPERATURE: 98.2 F | OXYGEN SATURATION: 100 % | HEIGHT: 60 IN | DIASTOLIC BLOOD PRESSURE: 78 MMHG | WEIGHT: 130 LBS

## 2020-02-14 DIAGNOSIS — F51.01 PRIMARY INSOMNIA: ICD-10-CM

## 2020-02-14 PROCEDURE — 99213 OFFICE O/P EST LOW 20 MIN: CPT | Performed by: NURSE PRACTITIONER

## 2020-02-14 RX ORDER — ZOLPIDEM TARTRATE 12.5 MG/1
12.5 TABLET, FILM COATED, EXTENDED RELEASE ORAL NIGHTLY PRN
Qty: 30 TABLET | Refills: 0 | Status: SHIPPED | OUTPATIENT
Start: 2020-02-14 | End: 2020-04-06

## 2020-02-14 RX ORDER — CYCLOSPORINE 0.5 MG/ML
1 EMULSION OPHTHALMIC EVERY 12 HOURS
Qty: 30 EACH | Refills: 6 | Status: SHIPPED | OUTPATIENT
Start: 2020-02-14 | End: 2021-12-21

## 2020-02-14 NOTE — PROGRESS NOTES
"Subjective   Taty Garcia is a 59 y.o. female.     History of Present Illness   Taty Garcia 59 y.o. female presents for follow up of insomnia with onset of symptoms years ago. Patient describes symptoms as frequent night time awakening and difficulty falling asleep. Patient has found complete relief with Ambien (Zolpidem). Associated symptoms include: fatigue if unable to take Rx . Patient denies daytime somnolence Symptoms have stabilized.  The patient has failed multiple OTC medications for insomnia.  They are well controlled on current Rx and will continue to try to take Rx PRN.  She will use the lowest effective dose.  The patient has read and signed the [facility] Controlled Substance Contract.  I will continue to see patient for regular follow up appointments and be prescribed the lowest effective dose.  CALEB has been reviewed by me and is updated every 3 months. The patient is aware of the potential for addiction and dependence.  She denies that Ambien (Zolpidem) causes excessive daytime drowsiness and sleep walking.  Patient voices understanding to take Ambien (Zolpidem) and go straight to bed. Patient must be able to sleep 7 hours or more when taking this.  Patient will hold Rx and contact me if they experience any impaired mental alertness the next day.        The following portions of the patient's history were reviewed and updated as appropriate: allergies, current medications, past social history and problem list.    Review of Systems   Constitutional: Negative for fever.   Respiratory: Negative for cough and shortness of breath.    Cardiovascular: Negative for chest pain.   Gastrointestinal: Negative for abdominal pain.   Neurological: Negative for dizziness.       Objective   /78 (BP Location: Left arm, Patient Position: Sitting)   Pulse 62   Temp 98.2 °F (36.8 °C)   Ht 152.4 cm (60\")   Wt 59 kg (130 lb)   SpO2 100%   BMI 25.39 kg/m²   Physical Exam   Constitutional: She is " oriented to person, place, and time. Vital signs are normal. She appears well-developed and well-nourished. No distress.   HENT:   Head: Normocephalic.   Cardiovascular: Normal rate, regular rhythm and normal heart sounds.   Pulmonary/Chest: Effort normal and breath sounds normal.   Neurological: She is alert and oriented to person, place, and time. Gait normal.   Psychiatric: She has a normal mood and affect. Her behavior is normal. Judgment and thought content normal.   Vitals reviewed.    The patient has read and signed the [facility] Controlled Substance Contract.  I will continue to see patient for regular follow up appointments.  They are well controlled on their medication.  CALEB has been reviewed by me and is updated every 3 months. The patient is aware of the potential for addiction and dependence.    Assessment/Plan      Diagnosis Plan   1. Primary insomnia  zolpidem CR (AMBIEN CR) 12.5 MG CR tablet     rto in 3 [facility]   Dennis Andujar, APRN  2/14/2020

## 2020-04-05 DIAGNOSIS — F51.01 PRIMARY INSOMNIA: ICD-10-CM

## 2020-04-06 RX ORDER — ZOLPIDEM TARTRATE 12.5 MG/1
12.5 TABLET, FILM COATED, EXTENDED RELEASE ORAL NIGHTLY PRN
Qty: 30 TABLET | Refills: 0 | Status: SHIPPED | OUTPATIENT
Start: 2020-04-06 | End: 2020-05-13 | Stop reason: SDUPTHER

## 2020-05-11 DIAGNOSIS — F51.01 PRIMARY INSOMNIA: ICD-10-CM

## 2020-05-11 RX ORDER — ZOLPIDEM TARTRATE 12.5 MG/1
12.5 TABLET, FILM COATED, EXTENDED RELEASE ORAL NIGHTLY PRN
Qty: 30 TABLET | Refills: 0 | OUTPATIENT
Start: 2020-05-11

## 2020-05-11 NOTE — TELEPHONE ENCOUNTER
Patient calling to refill:  - zolpidem CR (AMBIEN CR) 12.5 MG CR tablet    Verified [facility] on [address].    Please call patient when sent to pharmacy at [phone].

## 2020-05-12 DIAGNOSIS — F51.01 PRIMARY INSOMNIA: ICD-10-CM

## 2020-05-12 RX ORDER — ZOLPIDEM TARTRATE 12.5 MG/1
12.5 TABLET, FILM COATED, EXTENDED RELEASE ORAL NIGHTLY PRN
Qty: 30 TABLET | Refills: 0 | OUTPATIENT
Start: 2020-05-12

## 2020-05-12 NOTE — TELEPHONE ENCOUNTER
I received a call from the patient to check the status of her med refill. I noticed she had a note to schedule an appointment to have it refilled. I got her scheduled, but upon further inspection, the message was routed to the hub. Unfortunately we do not get alerts for messages so I wouldn't have seen this until I accidentally stumbled upon it.

## 2020-05-13 ENCOUNTER — PRIOR AUTHORIZATION (OUTPATIENT)
Dept: FAMILY MEDICINE CLINIC | Facility: CLINIC | Age: 60
End: 2020-05-13

## 2020-05-13 ENCOUNTER — TELEMEDICINE (OUTPATIENT)
Dept: FAMILY MEDICINE CLINIC | Facility: CLINIC | Age: 60
End: 2020-05-13

## 2020-05-13 VITALS — BODY MASS INDEX: 24.41 KG/M2 | WEIGHT: 125 LBS

## 2020-05-13 DIAGNOSIS — F51.01 PRIMARY INSOMNIA: Primary | ICD-10-CM

## 2020-05-13 PROCEDURE — 99213 OFFICE O/P EST LOW 20 MIN: CPT | Performed by: NURSE PRACTITIONER

## 2020-05-13 RX ORDER — ZOLPIDEM TARTRATE 12.5 MG/1
12.5 TABLET, FILM COATED, EXTENDED RELEASE ORAL NIGHTLY PRN
Qty: 90 TABLET | Refills: 0 | Status: SHIPPED | OUTPATIENT
Start: 2020-05-13 | End: 2020-08-24

## 2020-05-13 NOTE — PROGRESS NOTES
Subjective   Taty Garcia is a 59 y.o. female.     History of Present Illness     This was an audio and video enabled telemedicine encounter.      Taty Garcia 59 y.o. female presents for follow up of insomnia with onset of symptoms years ago. Patient describes symptoms as early morning awakening and frequent night time awakening. Patient has found complete relief with Ambien (Zolpidem). Associated symptoms include: fatigue if unable to take Rx . Patient denies daytime somnolence Symptoms have stabilized.  The patient has failed multiple OTC medications for insomnia.  They are well controlled on current Rx and will continue to try to take Rx PRN.  She will use the lowest effective dose.  The patient has read and signed the [facility] Controlled Substance Contract.  I will continue to see patient for regular follow up appointments and be prescribed the lowest effective dose.  CALEB has been reviewed by me and is updated every 3 months. The patient is aware of the potential for addiction and dependence.  She denies that Ambien (Zolpidem) causes excessive daytime drowsiness and sleep walking.  Patient voices understanding to take Ambien (Zolpidem) and go straight to bed. Patient must be able to sleep 7 hours or more when taking this.  Patient will hold Rx and contact me if they experience any impaired mental alertness the next day.        The following portions of the patient's history were reviewed and updated as appropriate: allergies, current medications, past social history and problem list.    Review of Systems   Constitutional: Negative for fever.   Respiratory: Negative for cough and shortness of breath.    Cardiovascular: Negative for chest pain.   Gastrointestinal: Negative for abdominal pain.   Neurological: Negative for dizziness.       Objective   Wt 56.7 kg (125 lb)   BMI 24.41 kg/m²   Physical Exam    Assessment/Plan      Diagnosis Plan   1. Primary insomnia  zolpidem CR (AMBIEN CR) 12.5 MG  CR tablet     rto in 3 linda Andujar, APRN  5/13/2020

## 2020-06-17 ENCOUNTER — PRIOR AUTHORIZATION (OUTPATIENT)
Dept: FAMILY MEDICINE CLINIC | Facility: CLINIC | Age: 60
End: 2020-06-17

## 2020-07-22 DIAGNOSIS — M19.90 ARTHRITIS: ICD-10-CM

## 2020-08-03 ENCOUNTER — OFFICE VISIT (OUTPATIENT)
Dept: FAMILY MEDICINE CLINIC | Facility: CLINIC | Age: 60
End: 2020-08-03

## 2020-08-03 VITALS
HEIGHT: 60 IN | TEMPERATURE: 97.5 F | BODY MASS INDEX: 25.01 KG/M2 | HEART RATE: 72 BPM | DIASTOLIC BLOOD PRESSURE: 74 MMHG | OXYGEN SATURATION: 99 % | SYSTOLIC BLOOD PRESSURE: 126 MMHG | WEIGHT: 127.4 LBS

## 2020-08-03 DIAGNOSIS — E78.5 HYPERLIPIDEMIA, UNSPECIFIED HYPERLIPIDEMIA TYPE: ICD-10-CM

## 2020-08-03 DIAGNOSIS — Z13.1 SCREENING FOR DIABETES MELLITUS: ICD-10-CM

## 2020-08-03 DIAGNOSIS — Z00.00 ROUTINE GENERAL MEDICAL EXAMINATION AT A HEALTH CARE FACILITY: Primary | ICD-10-CM

## 2020-08-03 DIAGNOSIS — Z13.0 SCREENING FOR IRON DEFICIENCY ANEMIA: ICD-10-CM

## 2020-08-03 PROCEDURE — 99396 PREV VISIT EST AGE 40-64: CPT | Performed by: NURSE PRACTITIONER

## 2020-08-03 NOTE — PROGRESS NOTES
"Subjective   Taty Garcia is a 59 y.o. female.     History of Present Illness   Well Adult Physical: Patient here for a comprehensive physical exam.The patient reports no problems  Do you take any herbs or supplements that were not prescribed by a doctor? no Are you taking calcium supplements? no Are you taking aspirin daily? no        The following portions of the patient's history were reviewed and updated as appropriate: allergies, current medications, past social history and problem list.    Review of Systems   Constitutional: Negative for fever.   Respiratory: Negative for cough and shortness of breath.    Cardiovascular: Negative for chest pain.   Gastrointestinal: Negative for abdominal pain.   Neurological: Negative for dizziness.       Objective   /74   Pulse 72   Temp 97.5 °F (36.4 °C)   Ht 152.4 cm (60\")   Wt 57.8 kg (127 lb 6.4 oz)   SpO2 99%   BMI 24.88 kg/m²   Physical Exam   Constitutional: She is oriented to person, place, and time. She appears well-developed and well-nourished. No distress.   HENT:   Head: Normocephalic and atraumatic. Hair is normal.   Right Ear: Hearing, tympanic membrane, external ear and ear canal normal. No drainage. No decreased hearing is noted.   Left Ear: Hearing, tympanic membrane, external ear and ear canal normal. No decreased hearing is noted.   Nose: No nasal deformity.   Mouth/Throat: Oropharynx is clear and moist.   Eyes: Pupils are equal, round, and reactive to light. Conjunctivae, EOM and lids are normal. Right eye exhibits no discharge. Left eye exhibits no discharge.   Neck: Normal range of motion. Neck supple. No JVD present. No tracheal deviation present. No thyromegaly present.   Cardiovascular: Normal rate, regular rhythm, normal heart sounds, intact distal pulses and normal pulses. Exam reveals no gallop and no friction rub.   No murmur heard.  Pulmonary/Chest: Effort normal and breath sounds normal. No respiratory distress. She has no " wheezes. She has no rales. She exhibits no tenderness.   Abdominal: Soft. Bowel sounds are normal. She exhibits no distension and no mass. There is no tenderness. There is no rebound and no guarding. No hernia.   Musculoskeletal: Normal range of motion. She exhibits no edema, tenderness or deformity.   Lymphadenopathy:     She has no cervical adenopathy.   Neurological: She is alert and oriented to person, place, and time. She has normal reflexes. She displays normal reflexes. No cranial nerve deficit. She exhibits normal muscle tone. Coordination normal.   Skin: Skin is warm and dry. No rash noted. She is not diaphoretic. No erythema.   Psychiatric: She has a normal mood and affect. Her behavior is normal. Judgment and thought content normal.   Vitals reviewed.      Assessment/Plan      Diagnosis Plan   1. Routine general medical examination at a health care facility     2. Screening for iron deficiency anemia  CBC & Differential   3. Hyperlipidemia, unspecified hyperlipidemia type  Comprehensive Metabolic Panel    Lipid Panel With LDL / HDL Ratio   4. Screening for diabetes mellitus  Comprehensive Metabolic Panel     Discussed weight, diet and exercise  Follow up after labs      YEHUDA Pineda  8/3/2020

## 2020-08-04 LAB
ALBUMIN SERPL-MCNC: 4.6 G/DL (ref 3.8–4.9)
ALBUMIN/GLOB SERPL: 2.6 {RATIO} (ref 1.2–2.2)
ALP SERPL-CCNC: 129 IU/L (ref 39–117)
ALT SERPL-CCNC: 15 IU/L (ref 0–32)
AST SERPL-CCNC: 19 IU/L (ref 0–40)
BASOPHILS # BLD AUTO: 0.1 X10E3/UL (ref 0–0.2)
BASOPHILS NFR BLD AUTO: 1 %
BILIRUB SERPL-MCNC: 0.4 MG/DL (ref 0–1.2)
BUN SERPL-MCNC: 24 MG/DL (ref 6–24)
BUN/CREAT SERPL: 33 (ref 9–23)
CALCIUM SERPL-MCNC: 9.4 MG/DL (ref 8.7–10.2)
CHLORIDE SERPL-SCNC: 104 MMOL/L (ref 96–106)
CHOLEST SERPL-MCNC: 163 MG/DL (ref 100–199)
CO2 SERPL-SCNC: 27 MMOL/L (ref 20–29)
CREAT SERPL-MCNC: 0.73 MG/DL (ref 0.57–1)
EOSINOPHIL # BLD AUTO: 0.1 X10E3/UL (ref 0–0.4)
EOSINOPHIL NFR BLD AUTO: 2 %
ERYTHROCYTE [DISTWIDTH] IN BLOOD BY AUTOMATED COUNT: 12.3 % (ref 11.7–15.4)
GLOBULIN SER CALC-MCNC: 1.8 G/DL (ref 1.5–4.5)
GLUCOSE SERPL-MCNC: 91 MG/DL (ref 65–99)
HCT VFR BLD AUTO: 39 % (ref 34–46.6)
HDLC SERPL-MCNC: 97 MG/DL
HGB BLD-MCNC: 12.8 G/DL (ref 11.1–15.9)
IMM GRANULOCYTES # BLD AUTO: 0 X10E3/UL (ref 0–0.1)
IMM GRANULOCYTES NFR BLD AUTO: 0 %
LDLC SERPL CALC-MCNC: 53 MG/DL (ref 0–99)
LDLC/HDLC SERPL: 0.5 RATIO (ref 0–3.2)
LYMPHOCYTES # BLD AUTO: 1.6 X10E3/UL (ref 0.7–3.1)
LYMPHOCYTES NFR BLD AUTO: 27 %
MCH RBC QN AUTO: 30.3 PG (ref 26.6–33)
MCHC RBC AUTO-ENTMCNC: 32.8 G/DL (ref 31.5–35.7)
MCV RBC AUTO: 92 FL (ref 79–97)
MONOCYTES # BLD AUTO: 0.4 X10E3/UL (ref 0.1–0.9)
MONOCYTES NFR BLD AUTO: 7 %
NEUTROPHILS # BLD AUTO: 3.6 X10E3/UL (ref 1.4–7)
NEUTROPHILS NFR BLD AUTO: 63 %
PLATELET # BLD AUTO: 276 X10E3/UL (ref 150–450)
POTASSIUM SERPL-SCNC: 4.5 MMOL/L (ref 3.5–5.2)
PROT SERPL-MCNC: 6.4 G/DL (ref 6–8.5)
RBC # BLD AUTO: 4.22 X10E6/UL (ref 3.77–5.28)
SODIUM SERPL-SCNC: 143 MMOL/L (ref 134–144)
TRIGL SERPL-MCNC: 66 MG/DL (ref 0–149)
VLDLC SERPL CALC-MCNC: 13 MG/DL (ref 5–40)
WBC # BLD AUTO: 5.7 X10E3/UL (ref 3.4–10.8)

## 2020-08-18 ENCOUNTER — TELEPHONE (OUTPATIENT)
Dept: FAMILY MEDICINE CLINIC | Facility: CLINIC | Age: 60
End: 2020-08-18

## 2020-08-18 NOTE — TELEPHONE ENCOUNTER
Stop med and contact insurance so we can let them know she needs the repatha due to allergic reaction to new med

## 2020-08-18 NOTE — TELEPHONE ENCOUNTER
Patient called and since she stopped taking repatha injections a little over a month ago due to INS would not cover. Since starting praluent chol injections she is now having itching on her head, back and a lot of back pain. Would like a call back to discuss.     Best call back [phone]    Verified pharmacy-[id number] [address] [place] [address] - [phone]  - [phone] FX

## 2020-08-18 NOTE — TELEPHONE ENCOUNTER
Called and spoke to patient and informed her nancy is out of the office this week and that she will be back next week to address this. She wanted nancy to know that nothing else has changed except that medication

## 2020-08-23 DIAGNOSIS — M19.90 ARTHRITIS: ICD-10-CM

## 2020-08-23 DIAGNOSIS — F51.01 PRIMARY INSOMNIA: ICD-10-CM

## 2020-08-25 RX ORDER — ZOLPIDEM TARTRATE 12.5 MG/1
12.5 TABLET, FILM COATED, EXTENDED RELEASE ORAL NIGHTLY PRN
Qty: 90 TABLET | Refills: 0 | OUTPATIENT
Start: 2020-08-25 | End: 2020-12-07

## 2020-09-09 ENCOUNTER — TELEPHONE (OUTPATIENT)
Dept: FAMILY MEDICINE CLINIC | Facility: CLINIC | Age: 60
End: 2020-09-09

## 2020-09-09 NOTE — TELEPHONE ENCOUNTER
PATIENT WANTED TO CHECK ON SAMPLES OF BYSTOLIC 5MG , INDICATED SHE HAS BEEN GETTING SAMPLES AND IS WILL BE OUT AS OF Friday 9/11/20     PT CALL BACK # [phone]

## 2020-09-30 DIAGNOSIS — M19.90 ARTHRITIS: ICD-10-CM

## 2020-10-07 ENCOUNTER — TELEPHONE (OUTPATIENT)
Dept: FAMILY MEDICINE CLINIC | Facility: CLINIC | Age: 60
End: 2020-10-07

## 2020-10-07 NOTE — TELEPHONE ENCOUNTER
Patient said she called the online [facility] and has a prescription she dosent need this and said she can do the exercises they told her to do from previous PT

## 2020-10-07 NOTE — TELEPHONE ENCOUNTER
Clinical Question    Patient Name:  Taty Garcia    :  1960    MRN:  [id number]        Patient's Question:  PATIENT FEELS SHE MAY HAVE VERTIGO AGAIN. SHE HAD IT ABOUT ONE YEAR AGO AND STARTING FEELING THE SAME SYMPTOMS SHE HAD PREVIOUSLY.  PATIENT WOULD LIKE TO KNOW IF ANYTHING CAN BE CALLED IN FOR HER SYMPTOMS.   SHE WANTED TO BE SEEN BUT THERE WASN'T ANY AVAILABILITY.     Patient's symptoms if any:  DIZZINESS, NAUSEA    How long have you been experiencing symptoms:  STARTED THIS AM    What medications/remedies have you tried:  N/A  Have those medications/remedies been helpful, if yes explain:  N/A  Preferred pharmacy:  [id number] [phone] [facility] [phone]      Best call back number:  [phone]    Patient notified.

## 2020-10-13 DIAGNOSIS — M19.90 ARTHRITIS: ICD-10-CM

## 2020-10-14 RX ORDER — CYCLOBENZAPRINE HCL 5 MG
TABLET ORAL
Qty: 90 TABLET | Refills: 0 | Status: SHIPPED | OUTPATIENT
Start: 2020-10-14 | End: 2021-01-05

## 2020-11-04 DIAGNOSIS — M19.90 ARTHRITIS: ICD-10-CM

## 2020-12-07 DIAGNOSIS — F51.01 PRIMARY INSOMNIA: ICD-10-CM

## 2020-12-07 DIAGNOSIS — M19.90 ARTHRITIS: ICD-10-CM

## 2020-12-07 RX ORDER — ZOLPIDEM TARTRATE 12.5 MG/1
TABLET, FILM COATED, EXTENDED RELEASE ORAL
Qty: 90 TABLET | Refills: 0 | Status: SHIPPED | OUTPATIENT
Start: 2020-12-07 | End: 2021-04-01 | Stop reason: SDUPTHER

## 2020-12-09 ENCOUNTER — OFFICE VISIT (OUTPATIENT)
Dept: CARDIOLOGY | Facility: CLINIC | Age: 60
End: 2020-12-09

## 2020-12-09 VITALS
OXYGEN SATURATION: 97 % | DIASTOLIC BLOOD PRESSURE: 80 MMHG | WEIGHT: 132.4 LBS | RESPIRATION RATE: 18 BRPM | HEART RATE: 90 BPM | HEIGHT: 60 IN | BODY MASS INDEX: 26 KG/M2 | SYSTOLIC BLOOD PRESSURE: 128 MMHG

## 2020-12-09 DIAGNOSIS — R07.9 CHEST PAIN, UNSPECIFIED TYPE: ICD-10-CM

## 2020-12-09 DIAGNOSIS — I10 ESSENTIAL HYPERTENSION: ICD-10-CM

## 2020-12-09 DIAGNOSIS — R06.02 SHORTNESS OF BREATH: ICD-10-CM

## 2020-12-09 DIAGNOSIS — I25.10 CORONARY ARTERY DISEASE INVOLVING NATIVE CORONARY ARTERY OF NATIVE HEART WITHOUT ANGINA PECTORIS: Primary | ICD-10-CM

## 2020-12-09 PROCEDURE — 93000 ELECTROCARDIOGRAM COMPLETE: CPT | Performed by: NURSE PRACTITIONER

## 2020-12-09 PROCEDURE — 99214 OFFICE O/P EST MOD 30 MIN: CPT | Performed by: NURSE PRACTITIONER

## 2020-12-09 RX ORDER — ALIROCUMAB 75 MG/ML
INJECTION, SOLUTION SUBCUTANEOUS
COMMUNITY
End: 2021-03-24

## 2020-12-09 NOTE — PROGRESS NOTES
"  Date of Office Visit: 2020  Encounter Provider: YEHUDA Naik  Place of Service: [facility] CARDIOLOGY  Patient Name: Taty Garcia  :1960    Chief Complaint   Patient presents with   • Coronary Artery Disease     1 YR FOLLOW UP   :     HPI: Taty Garcia is a 60 y.o. female, known to me, who presents today for follow-up.  Old records have been obtained and reviewed by me.  She is a patient of Dr. Anthony with a past cardiac history significant for coronary artery disease.  In , she underwent drug-eluting stent placement to her mid LAD.   In 2017, she had a normal treadmill stress test.  I last saw her in the office on 2019 at which time she was doing well with no complaints of angina or heart failure.  No changes were made to her medical regimen, and she was advised to follow-up in 1 year.   Overall, she has been doing well.  Evidently she went to [place] in November.  She was taking lots of walks on the beach and noticed she was becoming a lot more winded than she used to.  Since she returned to [place], she has been taking walks and feeling like she frequently needs to \"slow down\".  She also reports \"streaks of pain\" through her chest.  This is not exertional and different than her previous chest pain which felt like bricks on her chest.  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  She has a lot of anxiety surrounding the Covid situation.  She used to be really active but has not been very active lately mostly because of the cold.  She denies any palpitations, PND, orthopnea, dizziness, or syncope.    Past Medical History:   Diagnosis Date   • Allergic     bronchitis, sinustitis   • Anxiety    • Arthritis    • Cervical strain 10/11/2018   • Cervicalgia    • Chest pain    • Coronary artery disease    • Depression    • History of coronary artery stent placement 2018 "   • Hyperlipidemia 3/29/2017   • Hypertension    • Insomnia    • SOB (shortness of breath)    • Tendonitis    • UTI (urinary tract infection)        Past Surgical History:   Procedure Laterality Date   • BREAST SURGERY      cosmetic procedures   • CARDIAC CATHETERIZATION     • CARDIAC CATHETERIZATION     • HYSTERECTOMY     • NECK SURGERY     • SPINE SURGERY      spinal fusion of cervical region   • SUBTOTAL HYSTERECTOMY         Social History     Socioeconomic History   • Marital status:      Spouse name: Not on file   • Number of children: Not on file   • Years of education: Not on file   • Highest education level: Not on file   Tobacco Use   • Smoking status: Never Smoker   • Smokeless tobacco: Never Used   • Tobacco comment: CAFFEINE USE- 2 GLASSES TEA DAILY   Substance and Sexual Activity   • Alcohol use: Yes     Alcohol/week: 2.0 standard drinks     Types: 1 Glasses of wine, 1 Shots of liquor per week     Comment: occ   • Drug use: No   • Sexual activity: Defer       Family History   Problem Relation Age of Onset   • Cancer Mother         breast cancer   • Arthritis Mother    • Stroke Mother    • Heart disease Father        Review of Systems   Constitution: Negative for chills, fever and malaise/fatigue.   Cardiovascular: Positive for chest pain and dyspnea on exertion. Negative for leg swelling, near-syncope, orthopnea, palpitations, paroxysmal nocturnal dyspnea and syncope.   Respiratory: Negative for cough and shortness of breath.    Musculoskeletal: Negative for joint pain, joint swelling and myalgias.   Gastrointestinal: Negative for abdominal pain, diarrhea, melena, nausea and vomiting.   Genitourinary: Negative for frequency and hematuria.   Neurological: Negative for light-headedness, numbness, paresthesias and seizures.   Allergic/Immunologic: Negative.    All other systems reviewed and are negative.      Allergies   Allergen Reactions   • Codeine Itching   • Lipitor [Atorvastatin] Myalgia      "Other reaction(s): Myalgia   • Oxycodone-Acetaminophen Itching   • Praluent [Alirocumab] Itching   • Pravachol [Pravastatin Sodium] Myalgia   • Pravastatin Myalgia     Other reaction(s): Myalgia         Current Outpatient Medications:   •  Alirocumab (Praluent) 75 MG/ML solution auto-injector, Inject  under the skin into the appropriate area as directed., Disp: , Rfl:   •  aspirin 81 MG EC tablet, Take 81 mg by mouth Daily., Disp: , Rfl:   •  Biotin 1 MG capsule, Take 1 tablet by mouth Daily., Disp: , Rfl:   •  cetirizine (zyrTEC) 10 MG tablet, Take 10 mg by mouth Daily., Disp: , Rfl:   •  Cholecalciferol (VITAMIN D-3) 1000 UNITS capsule, Take 1,000 Units by mouth Daily., Disp: , Rfl:   •  cyclobenzaprine (FLEXERIL) 5 MG tablet, TAKE 1 TABLET BY MOUTH THREE TIMES A DAY , Disp: 90 tablet, Rfl: 0  •  diclofenac (VOLTAREN) 50 MG EC tablet, TAKE 1 TABLET BY MOUTH TWO TIMES A DAY , Disp: 60 tablet, Rfl: 0  •  Multiple Minerals-Vitamins (CALCIUM CITRATE +) tablet, Take 1 tablet by mouth Daily., Disp: , Rfl:   •  nebivolol (BYSTOLIC) 5 MG tablet, Take 1 tablet by mouth Daily. Takes three times a week, Disp: 90 tablet, Rfl: 3  •  RESTASIS 0.05 % ophthalmic emulsion, Administer 1 drop to both eyes Every 12 (Twelve) Hours., Disp: 30 each, Rfl: 6  •  zolpidem CR (AMBIEN CR) 12.5 MG CR tablet, TAKE 1 TABLET BY MOUTH ONE TIME A DAY AT NIGHT AS NEEDED FOR SLEEP, Disp: 90 tablet, Rfl: 0      Objective:     Vitals:    12/09/20 1429 12/09/20 1439   BP: 126/78 128/80   BP Location: Right arm Left arm   Patient Position: Sitting Sitting   Pulse: 90    Resp: 18    SpO2: 97%    Weight: 60.1 kg (132 lb 6.4 oz)    Height: 152.4 cm (60\")      Body mass index is 25.86 kg/m².    PHYSICAL EXAM:    Constitutional:       General: Not in acute distress.     Appearance: Well-developed. Not diaphoretic.   Eyes:      Pupils: Pupils are equal, round, and reactive to light.   HENT:      Head: Normocephalic and atraumatic.   Neck:      Thyroid: No " thyromegaly.      Vascular: No JVD.   Pulmonary:      Effort: Pulmonary effort is normal. No respiratory distress.      Breath sounds: Normal breath sounds.   Cardiovascular:      Normal rate. Regular rhythm.   Pulses:     Intact distal pulses.   Abdominal:      General: Bowel sounds are normal. There is no distension.      Palpations: Abdomen is soft. There is no hepatomegaly or splenomegaly.      Tenderness: There is no abdominal tenderness.   Musculoskeletal: Normal range of motion.   Skin:     General: Skin is warm and dry.      Findings: No erythema.   Neurological:      Mental Status: Alert and oriented to person, place, and time.   Psychiatric:         Behavior: Behavior normal.         Judgment: Judgment normal.           ECG 12 Lead    Date/Time: 12/9/2020 2:48 PM  Performed by: Cyndy Rojas APRN  Authorized by: Cyndy Rojas APRN   Comparison: compared with previous ECG from 11/5/2019  Similar to previous ECG  Rhythm: sinus rhythm  Rate: normal  BPM: 78    Clinical impression: normal ECG  Comments: Indication: CAD              Assessment:       Diagnosis Plan   1. Coronary artery disease involving native coronary artery of native heart without angina pectoris  ECG 12 Lead    Adult Stress Echo W/ Cont or Stress Agent if Necessary Per Protocol   2. Essential hypertension     3. Shortness of breath  Adult Stress Echo W/ Cont or Stress Agent if Necessary Per Protocol   4. Chest pain, unspecified type  Adult Stress Echo W/ Cont or Stress Agent if Necessary Per Protocol     Orders Placed This Encounter   Procedures   • ECG 12 Lead     This order was created via procedure documentation   • Adult Stress Echo W/ Cont or Stress Agent if Necessary Per Protocol     Standing Status:   Future     Standing Expiration Date:   12/9/2021     Order Specific Question:   What stress agent will be used?     Answer:   Exercise with Possible [employer]     Order Specific Question:   Reason for exam?     Answer:    Chest Pain     Order Specific Question:   Reason for exam?     Answer:   Coronary Artery Disease          Plan:       1.  Coronary artery disease.  Her EKG is stable, and she is on good medical therapy.  You know, I am not 100% convinced her symptoms are anginal.  However, the shortness of breath is a noticeable change for her.  She has a lot of anxiety because her father passed away from heart disease.  She has not had an ischemic evaluation since 2017.  I would like to order a stress echocardiogram for further evaluation.      2.  Hypertension.  Her blood pressure looks fantastic.      3.  Hyperlipidemia.  Lipid panel from August of this year revealed excellent cholesterol control.  She is on Praluent.      Overall think she is stable.  Further recommendations will be made pending the results of her stress echocardiogram.  Otherwise, she will follow-up with Dr. Martin in 1 year or sooner if needed.      As always, it has been a pleasure to participate in your patient's care.      Sincerely,         YEHUDA Dougherty

## 2020-12-10 ENCOUNTER — PRIOR AUTHORIZATION (OUTPATIENT)
Dept: FAMILY MEDICINE CLINIC | Facility: CLINIC | Age: 60
End: 2020-12-10

## 2020-12-14 ENCOUNTER — TELEPHONE (OUTPATIENT)
Dept: FAMILY MEDICINE CLINIC | Facility: CLINIC | Age: 60
End: 2020-12-14

## 2020-12-14 ENCOUNTER — OFFICE VISIT (OUTPATIENT)
Dept: FAMILY MEDICINE CLINIC | Facility: CLINIC | Age: 60
End: 2020-12-14

## 2020-12-14 VITALS
TEMPERATURE: 97.1 F | HEART RATE: 80 BPM | SYSTOLIC BLOOD PRESSURE: 124 MMHG | DIASTOLIC BLOOD PRESSURE: 70 MMHG | BODY MASS INDEX: 26.11 KG/M2 | OXYGEN SATURATION: 99 % | WEIGHT: 133 LBS | HEIGHT: 60 IN

## 2020-12-14 DIAGNOSIS — F51.01 PRIMARY INSOMNIA: Primary | ICD-10-CM

## 2020-12-14 DIAGNOSIS — M65.342 TRIGGER RING FINGER OF LEFT HAND: ICD-10-CM

## 2020-12-14 DIAGNOSIS — Z79.899 HIGH RISK MEDICATION USE: ICD-10-CM

## 2020-12-14 PROCEDURE — 99213 OFFICE O/P EST LOW 20 MIN: CPT | Performed by: NURSE PRACTITIONER

## 2020-12-14 NOTE — TELEPHONE ENCOUNTER
PATIENT CALLED IN AND STATED THAT.DR REESE OR DR OCAMPO. ARE  THE HAND SPECIALISTS THAT SHE WOULD LIKE TO BE REFERRED TO BOTH ARE AT [facility]     CALL BACK [phone]

## 2020-12-14 NOTE — PROGRESS NOTES
Subjective   Taty Garcia is a 60 y.o. female.   Chief Complaint   Patient presents with   • Insomnia     Patient needed to be seen to continue to get her Ambien needs drug screen ( wore mask and goggles)        History of Present Illness   Taty Garcia 60 y.o. female presents for follow up of insomnia with onset of symptoms years ago. Patient describes symptoms as early morning awakening and frequent night time awakening. Patient has found complete relief with Ambien (Zolpidem). Associated symptoms include: fatigue if unable to take Rx . Patient denies daytime somnolence Symptoms have stabilized.  The patient has failed multiple OTC medications for insomnia.  They are well controlled on current Rx and will continue to try to take Rx PRN.  She will use the lowest effective dose.  The patient has read and signed the [facility] Controlled Substance Contract.  I will continue to see patient for regular follow up appointments and be prescribed the lowest effective dose.  CALEB has been reviewed by me and is updated every 3 months. The patient is aware of the potential for addiction and dependence.  She denies that Ambien (Zolpidem) causes excessive daytime drowsiness and sleep walking.  Patient voices understanding to take Ambien (Zolpidem) and go straight to bed. Patient must be able to sleep 7 hours or more when taking this.  Patient will hold Rx and contact me if they experience any impaired mental alertness the next day.        The following portions of the patient's history were reviewed and updated as appropriate: allergies, current medications, past social history and problem list.    Review of Systems   Constitutional: Negative for fever.   Respiratory: Negative for cough and shortness of breath.    Cardiovascular: Negative for chest pain.   Gastrointestinal: Negative for abdominal pain.   Neurological: Negative for dizziness.       Objective   /70   Pulse 80   Temp 97.1 °F (36.2 °C)   Ht  "152.4 cm (60\")   Wt 60.3 kg (133 lb)   SpO2 99%   BMI 25.97 kg/m²   Physical Exam  Vitals signs reviewed.   Constitutional:       General: She is not in acute distress.     Appearance: She is well-developed.   HENT:      Head: Normocephalic.   Cardiovascular:      Rate and Rhythm: Normal rate and regular rhythm.      Heart sounds: Normal heart sounds.   Pulmonary:      Effort: Pulmonary effort is normal.      Breath sounds: Normal breath sounds.   Neurological:      Mental Status: She is alert and oriented to person, place, and time.      Gait: Gait normal.   Psychiatric:         Behavior: Behavior normal.         Thought Content: Thought content normal.         Judgment: Judgment normal.         Assessment/Plan      Diagnosis Plan   1. Primary insomnia  ToxASSURE Select 13 (MW) - Urine, Clean Catch   2. High risk medication use  ToxASSURE Select 13 (MW) - Urine, Clean Catch   3. Trigger ring finger of left hand  Ambulatory Referral to Orthopedic Surgery     rto in 3 mons   Mask and googles worn    Dennis Andujar, APRN  12/14/2020  "

## 2020-12-17 ENCOUNTER — TRANSCRIBE ORDERS (OUTPATIENT)
Dept: SLEEP MEDICINE | Facility: HOSPITAL | Age: 60
End: 2020-12-17

## 2020-12-17 DIAGNOSIS — Z01.818 OTHER SPECIFIED PRE-OPERATIVE EXAMINATION: Primary | ICD-10-CM

## 2020-12-17 LAB — DRUGS UR: NORMAL

## 2020-12-26 ENCOUNTER — LAB (OUTPATIENT)
Dept: LAB | Facility: HOSPITAL | Age: 60
End: 2020-12-26

## 2020-12-26 DIAGNOSIS — Z01.818 OTHER SPECIFIED PRE-OPERATIVE EXAMINATION: ICD-10-CM

## 2020-12-26 LAB — SARS-COV-2 RNA RESP QL NAA+PROBE: NOT DETECTED

## 2020-12-26 PROCEDURE — U0004 COV-19 TEST NON-CDC HGH THRU: HCPCS

## 2020-12-26 PROCEDURE — C9803 HOPD COVID-19 SPEC COLLECT: HCPCS

## 2020-12-28 ENCOUNTER — HOSPITAL ENCOUNTER (OUTPATIENT)
Dept: CARDIOLOGY | Facility: HOSPITAL | Age: 60
Discharge: HOME | End: 2020-12-28
Admitting: NURSE PRACTITIONER

## 2020-12-28 ENCOUNTER — TELEPHONE (OUTPATIENT)
Dept: CARDIOLOGY | Facility: CLINIC | Age: 60
End: 2020-12-28

## 2020-12-28 VITALS
DIASTOLIC BLOOD PRESSURE: 60 MMHG | WEIGHT: 133 LBS | BODY MASS INDEX: 26.11 KG/M2 | OXYGEN SATURATION: 100 % | HEART RATE: 103 BPM | HEIGHT: 60 IN | SYSTOLIC BLOOD PRESSURE: 110 MMHG

## 2020-12-28 DIAGNOSIS — R07.9 CHEST PAIN, UNSPECIFIED TYPE: ICD-10-CM

## 2020-12-28 DIAGNOSIS — I25.10 CORONARY ARTERY DISEASE INVOLVING NATIVE CORONARY ARTERY OF NATIVE HEART WITHOUT ANGINA PECTORIS: ICD-10-CM

## 2020-12-28 DIAGNOSIS — R06.02 SHORTNESS OF BREATH: ICD-10-CM

## 2020-12-28 LAB
AORTIC ARCH: 2.2 CM
BH CV ECHO MEAS - ACS: 1.8 CM
BH CV ECHO MEAS - AO ARCH DIAM (PROXIMAL TRANS.): 2.2 CM
BH CV ECHO MEAS - AO MAX PG: 5.6 MMHG
BH CV ECHO MEAS - AO ROOT AREA (BSA CORRECTED): 1.4
BH CV ECHO MEAS - AO ROOT AREA: 3.9 CM^2
BH CV ECHO MEAS - AO ROOT DIAM: 2.2 CM
BH CV ECHO MEAS - AO V2 MAX: 118.1 CM/SEC
BH CV ECHO MEAS - BSA(HAYCOCK): 1.6 M^2
BH CV ECHO MEAS - BSA: 1.6 M^2
BH CV ECHO MEAS - BZI_BMI: 26 KILOGRAMS/M^2
BH CV ECHO MEAS - BZI_METRIC_HEIGHT: 152.4 CM
BH CV ECHO MEAS - BZI_METRIC_WEIGHT: 60.3 KG
BH CV ECHO MEAS - EDV(MOD-SP4): 47 ML
BH CV ECHO MEAS - EDV(TEICH): 51.8 ML
BH CV ECHO MEAS - EF(CUBED): 68 %
BH CV ECHO MEAS - EF(MOD-BP): 61 %
BH CV ECHO MEAS - EF(MOD-SP4): 61.7 %
BH CV ECHO MEAS - EF(TEICH): 60.6 %
BH CV ECHO MEAS - ESV(MOD-SP4): 18 ML
BH CV ECHO MEAS - ESV(TEICH): 20.4 ML
BH CV ECHO MEAS - FS: 31.6 %
BH CV ECHO MEAS - IVS/LVPW: 1.1
BH CV ECHO MEAS - IVSD: 1 CM
BH CV ECHO MEAS - LAT PEAK E' VEL: 9.8 CM/SEC
BH CV ECHO MEAS - LV DIASTOLIC VOL/BSA (35-75): 29.9 ML/M^2
BH CV ECHO MEAS - LV MASS(C)D: 99.1 GRAMS
BH CV ECHO MEAS - LV MASS(C)DI: 63.1 GRAMS/M^2
BH CV ECHO MEAS - LV SYSTOLIC VOL/BSA (12-30): 11.5 ML/M^2
BH CV ECHO MEAS - LVIDD: 3.5 CM
BH CV ECHO MEAS - LVIDS: 2.4 CM
BH CV ECHO MEAS - LVLD AP4: 5.9 CM
BH CV ECHO MEAS - LVLS AP4: 5.3 CM
BH CV ECHO MEAS - LVOT AREA (M): 2.8 CM^2
BH CV ECHO MEAS - LVOT AREA: 2.9 CM^2
BH CV ECHO MEAS - LVOT DIAM: 1.9 CM
BH CV ECHO MEAS - LVPWD: 0.91 CM
BH CV ECHO MEAS - MED PEAK E' VEL: 7 CM/SEC
BH CV ECHO MEAS - MV A DUR: 0.13 SEC
BH CV ECHO MEAS - MV A MAX VEL: 85.7 CM/SEC
BH CV ECHO MEAS - MV DEC TIME: 0.17 SEC
BH CV ECHO MEAS - MV E MAX VEL: 60.4 CM/SEC
BH CV ECHO MEAS - MV E/A: 0.7
BH CV ECHO MEAS - MV MAX PG: 3 MMHG
BH CV ECHO MEAS - MV MEAN PG: 1.6 MMHG
BH CV ECHO MEAS - MV V2 MAX: 86.9 CM/SEC
BH CV ECHO MEAS - MV V2 MEAN: 60.7 CM/SEC
BH CV ECHO MEAS - MV V2 VTI: 18.6 CM
BH CV ECHO MEAS - PA MAX PG: 3.9 MMHG
BH CV ECHO MEAS - PA V2 MAX: 99.1 CM/SEC
BH CV ECHO MEAS - PULM A REVS DUR: 0.11 SEC
BH CV ECHO MEAS - PULM A REVS VEL: 67.7 CM/SEC
BH CV ECHO MEAS - PULM DIAS VEL: 36.4 CM/SEC
BH CV ECHO MEAS - PULM S/D: 1.4
BH CV ECHO MEAS - PULM SYS VEL: 52.7 CM/SEC
BH CV ECHO MEAS - RAP SYSTOLE: 3 MMHG
BH CV ECHO MEAS - RVSP: 24 MMHG
BH CV ECHO MEAS - SI(CUBED): 19 ML/M^2
BH CV ECHO MEAS - SI(MOD-SP4): 18.5 ML/M^2
BH CV ECHO MEAS - SI(TEICH): 20 ML/M^2
BH CV ECHO MEAS - SUP REN AO DIAM: 1.8 CM
BH CV ECHO MEAS - SV(CUBED): 29.9 ML
BH CV ECHO MEAS - SV(MOD-SP4): 29 ML
BH CV ECHO MEAS - SV(TEICH): 31.4 ML
BH CV ECHO MEAS - TAPSE (>1.6): 1.4 CM
BH CV ECHO MEAS - TR MAX VEL: 229.4 CM/SEC
BH CV ECHO MEASUREMENTS AVERAGE E/E' RATIO: 7.19
BH CV STRESS BP STAGE 1: NORMAL
BH CV STRESS BP STAGE 2: NORMAL
BH CV STRESS DURATION MIN STAGE 1: 3
BH CV STRESS DURATION MIN STAGE 2: 3
BH CV STRESS DURATION SEC STAGE 1: 0
BH CV STRESS DURATION SEC STAGE 2: 0
BH CV STRESS GRADE STAGE 1: 10
BH CV STRESS GRADE STAGE 2: 12
BH CV STRESS HR STAGE 1: 130
BH CV STRESS HR STAGE 2: 156
BH CV STRESS METS STAGE 1: 5
BH CV STRESS METS STAGE 2: 7.5
BH CV STRESS PROTOCOL 1: NORMAL
BH CV STRESS SPEED STAGE 1: 1.7
BH CV STRESS SPEED STAGE 2: 2.5
BH CV STRESS STAGE 1: 1
BH CV STRESS STAGE 2: 2
BH CV XLRA - RV BASE: 2.6 CM
BH CV XLRA - RV LENGTH: 5.9 CM
BH CV XLRA - RV MID: 2.2 CM
BH CV XLRA - TDI S': 12.3 CM/SEC
LEFT ATRIUM VOLUME INDEX: 21 ML/M2
MAXIMAL PREDICTED HEART RATE: 162 BPM
PERCENT MAX PREDICTED HR: 96.3 %
STRESS BASELINE BP: NORMAL MMHG
STRESS BASELINE HR: 103 BPM
STRESS PERCENT HR: 113 %
STRESS POST ESTIMATED WORKLOAD: 7 METS
STRESS POST EXERCISE DUR MIN: 6 MIN
STRESS POST EXERCISE DUR SEC: 0 SEC
STRESS POST PEAK BP: NORMAL MMHG
STRESS POST PEAK HR: 156 BPM
STRESS TARGET HR: 138 BPM

## 2020-12-28 PROCEDURE — 93017 CV STRESS TEST TRACING ONLY: CPT

## 2020-12-28 PROCEDURE — 25010000002 PERFLUTREN (DEFINITY) 8.476 MG IN SODIUM CHLORIDE 0.9 % 10 ML INJECTION: Performed by: NURSE PRACTITIONER

## 2020-12-28 PROCEDURE — 93350 STRESS TTE ONLY: CPT | Performed by: INTERNAL MEDICINE

## 2020-12-28 PROCEDURE — 93325 DOPPLER ECHO COLOR FLOW MAPG: CPT

## 2020-12-28 PROCEDURE — 93320 DOPPLER ECHO COMPLETE: CPT | Performed by: INTERNAL MEDICINE

## 2020-12-28 PROCEDURE — 93320 DOPPLER ECHO COMPLETE: CPT

## 2020-12-28 PROCEDURE — 93018 CV STRESS TEST I&R ONLY: CPT | Performed by: INTERNAL MEDICINE

## 2020-12-28 PROCEDURE — 93352 ADMIN ECG CONTRAST AGENT: CPT | Performed by: INTERNAL MEDICINE

## 2020-12-28 PROCEDURE — 93325 DOPPLER ECHO COLOR FLOW MAPG: CPT | Performed by: INTERNAL MEDICINE

## 2020-12-28 PROCEDURE — 93350 STRESS TTE ONLY: CPT

## 2020-12-28 PROCEDURE — 93016 CV STRESS TEST SUPVJ ONLY: CPT | Performed by: INTERNAL MEDICINE

## 2020-12-28 RX ADMIN — PERFLUTREN 3 ML: 6.52 INJECTION, SUSPENSION INTRAVENOUS at 10:07

## 2021-01-04 DIAGNOSIS — M19.90 ARTHRITIS: ICD-10-CM

## 2021-01-05 RX ORDER — CYCLOBENZAPRINE HCL 5 MG
TABLET ORAL
Qty: 90 TABLET | Refills: 0 | Status: SHIPPED | OUTPATIENT
Start: 2021-01-05 | End: 2021-03-24

## 2021-01-06 ENCOUNTER — PRIOR AUTHORIZATION (OUTPATIENT)
Dept: FAMILY MEDICINE CLINIC | Facility: CLINIC | Age: 61
End: 2021-01-06

## 2021-01-11 ENCOUNTER — TELEPHONE (OUTPATIENT)
Dept: FAMILY MEDICINE CLINIC | Facility: CLINIC | Age: 61
End: 2021-01-11

## 2021-01-11 DIAGNOSIS — Z12.39 ENCOUNTER FOR SCREENING FOR MALIGNANT NEOPLASM OF BREAST, UNSPECIFIED SCREENING MODALITY: Primary | ICD-10-CM

## 2021-01-11 NOTE — TELEPHONE ENCOUNTER
LOLLY FROM [facility] STATES THEY NEED AN ORDER FOR A YONY SCREEN FOR THE PATIENT. SHE IS COMING FOR THIS TOMORROW, 1/12/2021.  FAX: [phone]    LOLLY CAN BE REACHED [phone].

## 2021-01-18 ENCOUNTER — TELEPHONE (OUTPATIENT)
Dept: FAMILY MEDICINE CLINIC | Facility: CLINIC | Age: 61
End: 2021-01-18

## 2021-01-18 DIAGNOSIS — I10 ESSENTIAL HYPERTENSION: ICD-10-CM

## 2021-01-18 RX ORDER — NEBIVOLOL 5 MG/1
TABLET ORAL
Qty: 30 TABLET | Refills: 0 | Status: SHIPPED | OUTPATIENT
Start: 2021-01-18 | End: 2022-03-07

## 2021-01-18 NOTE — TELEPHONE ENCOUNTER
PATIENT CALLED REQUESTING SAMPLES OF BYSTOLIC 5MG, STATES SHE USUALLY GETS THIS FROM [facility].    PT CALL BACK:

## 2021-01-18 NOTE — TELEPHONE ENCOUNTER
Samples have been requested but they get mailed to our office not sure when we receive them patient is aware she is requesting a prescription to be sent to her pharmacy to see if they will be covered until she can get samples prescription pending

## 2021-01-25 ENCOUNTER — PRIOR AUTHORIZATION (OUTPATIENT)
Dept: FAMILY MEDICINE CLINIC | Facility: CLINIC | Age: 61
End: 2021-01-25

## 2021-02-01 ENCOUNTER — TELEPHONE (OUTPATIENT)
Dept: FAMILY MEDICINE CLINIC | Facility: CLINIC | Age: 61
End: 2021-02-01

## 2021-02-01 NOTE — TELEPHONE ENCOUNTER
PT STATED THAT HER INSURANCE WILL NOT COVER THE PRESCRIPTION FOR BLOOD PRESSURE MEDICATION. WOULD LIKE A CALL BACK TO DISCUSS OPTIONS. STATES SHE NORMALLY GETS SAMPLES. PLEASE CALL AFTER 11:30.    CALL BACK [phone]

## 2021-02-07 DIAGNOSIS — M19.90 ARTHRITIS: ICD-10-CM

## 2021-03-14 DIAGNOSIS — M19.90 ARTHRITIS: ICD-10-CM

## 2021-03-24 ENCOUNTER — BULK ORDERING (OUTPATIENT)
Dept: CASE MANAGEMENT | Facility: OTHER | Age: 61
End: 2021-03-24

## 2021-03-24 DIAGNOSIS — M19.90 ARTHRITIS: ICD-10-CM

## 2021-03-24 DIAGNOSIS — Z23 IMMUNIZATION DUE: ICD-10-CM

## 2021-03-24 RX ORDER — CYCLOBENZAPRINE HCL 5 MG
TABLET ORAL
Qty: 90 TABLET | Refills: 0 | Status: SHIPPED | OUTPATIENT
Start: 2021-03-24 | End: 2021-06-15

## 2021-03-24 RX ORDER — ALIROCUMAB 75 MG/ML
INJECTION, SOLUTION SUBCUTANEOUS
Qty: 6 ML | Refills: 0 | Status: SHIPPED | OUTPATIENT
Start: 2021-03-24 | End: 2021-06-18

## 2021-04-01 ENCOUNTER — OFFICE VISIT (OUTPATIENT)
Dept: FAMILY MEDICINE CLINIC | Facility: CLINIC | Age: 61
End: 2021-04-01

## 2021-04-01 VITALS
BODY MASS INDEX: 25.48 KG/M2 | WEIGHT: 129.8 LBS | DIASTOLIC BLOOD PRESSURE: 70 MMHG | TEMPERATURE: 98.2 F | OXYGEN SATURATION: 98 % | SYSTOLIC BLOOD PRESSURE: 126 MMHG | HEART RATE: 70 BPM | HEIGHT: 60 IN

## 2021-04-01 DIAGNOSIS — F51.01 PRIMARY INSOMNIA: ICD-10-CM

## 2021-04-01 DIAGNOSIS — L82.1 SEBORRHEIC KERATOSIS: Primary | ICD-10-CM

## 2021-04-01 PROCEDURE — 99213 OFFICE O/P EST LOW 20 MIN: CPT | Performed by: NURSE PRACTITIONER

## 2021-04-01 PROCEDURE — 17110 DESTRUCTION B9 LES UP TO 14: CPT | Performed by: NURSE PRACTITIONER

## 2021-04-01 RX ORDER — ZOLPIDEM TARTRATE 12.5 MG/1
12.5 TABLET, FILM COATED, EXTENDED RELEASE ORAL NIGHTLY PRN
Qty: 30 TABLET | Refills: 0 | Status: SHIPPED | OUTPATIENT
Start: 2021-04-01 | End: 2021-05-10

## 2021-04-01 NOTE — PROGRESS NOTES
"Chief Complaint  Insomnia (Patient is needing her ambien refilled last drug screen in Dec ( wore mask and goggles) ) and sking tags (Patient is wanting to see if dennis can remove a few skin tags )    Subjective          Taty Garcia presents to [facility]  History of Present Illness  #1 insomnia-patient is currently on Ambien CR 12.5 mg nightly as directed with any side effects working well to control her insomnia.    #2 patient has 1 area on her chest and 1 on her upper left thigh that she would like to have frozen.  She typically goes to her dermatologist for this but she had to cancel her dermatology appointment on Monday and was unable to reschedule.    She is currently up-to-date on mammogram, Pap smear, colonoscopy.    Objective   Vital Signs:   /70   Pulse 70   Temp 98.2 °F (36.8 °C)   Ht 152.4 cm (60\")   Wt 58.9 kg (129 lb 12.8 oz)   SpO2 98%   BMI 25.35 kg/m²     Physical Exam  Vitals reviewed.   Constitutional:       General: She is not in acute distress.     Appearance: She is well-developed.   HENT:      Head: Normocephalic.   Cardiovascular:      Rate and Rhythm: Normal rate and regular rhythm.      Heart sounds: Normal heart sounds.   Pulmonary:      Effort: Pulmonary effort is normal.      Breath sounds: Normal breath sounds.   Neurological:      Mental Status: She is alert and oriented to person, place, and time.      Gait: Gait normal.   Psychiatric:         Behavior: Behavior normal.         Thought Content: Thought content normal.         Judgment: Judgment normal.        Result Review :          Cryotherapy, Skin Lesion    Date/Time: 4/1/2021 9:14 AM  Performed by: Dennis Andujar APRN  Authorized by: Dennis Andujar APRN   Preparation: Patient was prepped and draped in the usual sterile fashion.  Local anesthesia used: no    Anesthesia:  Local anesthesia used: no    Sedation:  Patient sedated: no    Patient tolerance: patient tolerated the procedure " well with no immediate complications  Comments: Area on chest and area on left upper thigh            Assessment and Plan    Diagnoses and all orders for this visit:    1. Seborrheic keratosis (Primary)    2. Primary insomnia  -     zolpidem CR (AMBIEN CR) 12.5 MG CR tablet; Take 1 tablet by mouth At Night As Needed for Sleep.  Dispense: 30 tablet; Refill: 0    Other orders  -     Cryotherapy, Skin Lesion        Follow Up   No follow-ups on file.  Patient was given instructions and counseling regarding her condition or for health maintenance advice. Please see specific information pulled into the AVS if appropriate.     Continue same with medication return to the office in 3 months    Mask and googles worn

## 2021-04-30 DIAGNOSIS — R14.1 GAS PAIN: ICD-10-CM

## 2021-04-30 DIAGNOSIS — R10.9 STOMACH PAIN: Primary | ICD-10-CM

## 2021-05-09 DIAGNOSIS — F51.01 PRIMARY INSOMNIA: ICD-10-CM

## 2021-05-10 RX ORDER — ZOLPIDEM TARTRATE 12.5 MG/1
TABLET, FILM COATED, EXTENDED RELEASE ORAL
Qty: 90 TABLET | Refills: 0 | Status: SHIPPED | OUTPATIENT
Start: 2021-05-10 | End: 2021-08-26

## 2021-06-13 DIAGNOSIS — M19.90 ARTHRITIS: ICD-10-CM

## 2021-06-15 RX ORDER — CYCLOBENZAPRINE HCL 5 MG
TABLET ORAL
Qty: 90 TABLET | Refills: 0 | Status: SHIPPED | OUTPATIENT
Start: 2021-06-15 | End: 2021-09-20

## 2021-06-18 RX ORDER — ALIROCUMAB 75 MG/ML
INJECTION, SOLUTION SUBCUTANEOUS
Qty: 6 ML | Refills: 0 | Status: SHIPPED | OUTPATIENT
Start: 2021-06-18 | End: 2021-09-07

## 2021-07-16 ENCOUNTER — TELEPHONE (OUTPATIENT)
Dept: FAMILY MEDICINE CLINIC | Facility: CLINIC | Age: 61
End: 2021-07-16

## 2021-08-02 ENCOUNTER — OFFICE VISIT (OUTPATIENT)
Dept: FAMILY MEDICINE CLINIC | Facility: CLINIC | Age: 61
End: 2021-08-02

## 2021-08-02 VITALS
BODY MASS INDEX: 25.35 KG/M2 | OXYGEN SATURATION: 98 % | SYSTOLIC BLOOD PRESSURE: 128 MMHG | TEMPERATURE: 97.7 F | HEART RATE: 70 BPM | DIASTOLIC BLOOD PRESSURE: 78 MMHG | WEIGHT: 129.1 LBS | HEIGHT: 60 IN

## 2021-08-02 DIAGNOSIS — I25.10 CORONARY ARTERY DISEASE INVOLVING NATIVE CORONARY ARTERY OF NATIVE HEART WITHOUT ANGINA PECTORIS: ICD-10-CM

## 2021-08-02 DIAGNOSIS — K21.9 GASTROESOPHAGEAL REFLUX DISEASE WITHOUT ESOPHAGITIS: ICD-10-CM

## 2021-08-02 DIAGNOSIS — R10.84 GENERALIZED ABDOMINAL PAIN: ICD-10-CM

## 2021-08-02 DIAGNOSIS — I10 ESSENTIAL HYPERTENSION: Primary | ICD-10-CM

## 2021-08-02 DIAGNOSIS — Z13.1 SCREENING FOR DIABETES MELLITUS: ICD-10-CM

## 2021-08-02 DIAGNOSIS — E78.5 HYPERLIPIDEMIA, UNSPECIFIED HYPERLIPIDEMIA TYPE: ICD-10-CM

## 2021-08-02 DIAGNOSIS — Z13.0 SCREENING FOR IRON DEFICIENCY ANEMIA: ICD-10-CM

## 2021-08-02 DIAGNOSIS — F51.01 PRIMARY INSOMNIA: ICD-10-CM

## 2021-08-02 PROCEDURE — 99214 OFFICE O/P EST MOD 30 MIN: CPT | Performed by: NURSE PRACTITIONER

## 2021-08-02 RX ORDER — OMEPRAZOLE 40 MG/1
CAPSULE, DELAYED RELEASE ORAL
COMMUNITY
Start: 2021-05-12

## 2021-08-02 RX ORDER — TOBRAMYCIN AND DEXAMETHASONE 3; 1 MG/ML; MG/ML
SUSPENSION/ DROPS OPHTHALMIC
COMMUNITY
Start: 2021-07-17 | End: 2021-12-21

## 2021-08-02 NOTE — PROGRESS NOTES
"Chief Complaint  Hyperlipidemia (Patient is wanting to get her yearly check up she is needing labs drawn but she is not fasting to get them today ( wore mask and goggles) )    Subjective          Taty Garcia presents to [facility]  History of Present Illness  #1 hypertension-patient is currently on Bystolic 5 mg daily as directed with any side effects blood pressures well controlled in office today.    2.  Hyperlipidemia-patient on Praluent 75 mg autoinjector every 2 weeks which she is using as directed without any side effects her cholesterol was last checked in 2020 with a normal value.    3.  Insomnia-patient is currently on Ambien CR 12.5 mg nightly as directed any side effects working well to control her insomnia.    4.  GERD-patient is currently on Prilosec 40 mg daily as directed and side effects working well to control her acid reflux.    5. Abdominal pain- has appointment with GI set up already but experiencing abdominal issues and pain with certain foods.  Objective   Vital Signs:   /78   Pulse 70   Temp 97.7 °F (36.5 °C)   Ht 152.4 cm (60\")   Wt 58.6 kg (129 lb 1.6 oz)   SpO2 98%   BMI 25.21 kg/m²     Physical Exam  Vitals reviewed.   Constitutional:       General: She is not in acute distress.     Appearance: She is well-developed.   HENT:      Head: Normocephalic.   Cardiovascular:      Rate and Rhythm: Normal rate and regular rhythm.      Heart sounds: Normal heart sounds.   Pulmonary:      Effort: Pulmonary effort is normal.      Breath sounds: Normal breath sounds.   Neurological:      Mental Status: She is alert and oriented to person, place, and time.      Gait: Gait normal.   Psychiatric:         Behavior: Behavior normal.         Thought Content: Thought content normal.         Judgment: Judgment normal.        Result Review :   The following data was reviewed by: YEHUDA Pineda on 08/02/2021:                      Assessment and Plan  "   Diagnoses and all orders for this visit:    1. Essential hypertension (Primary)    2. Hyperlipidemia, unspecified hyperlipidemia type  -     Comprehensive Metabolic Panel  -     Lipid Panel With LDL / HDL Ratio    3. Primary insomnia    4. Screening for iron deficiency anemia  -     CBC & Differential    5. Screening for diabetes mellitus  -     Comprehensive Metabolic Panel    6. Gastroesophageal reflux disease without esophagitis    7. Generalized abdominal pain  -     H. Pylori Breath Test - Breath, Lung  -     Celiac Comprehensive Panel  -     Food Allergy Profile        Follow Up   Return in about 3 months (around 11/2/2021) for Recheck.  Patient was given instructions and counseling regarding her condition or for health maintenance advice. Please see specific information pulled into the AVS if appropriate.     Cont same with meds  Follow up after labs     Mask and googles worn

## 2021-08-07 LAB
ALBUMIN SERPL-MCNC: 4.5 G/DL (ref 3.5–5.2)
ALBUMIN/GLOB SERPL: 2 G/DL
ALP SERPL-CCNC: 137 U/L (ref 39–117)
ALT SERPL-CCNC: 15 U/L (ref 1–33)
AST SERPL-CCNC: 17 U/L (ref 1–32)
BASOPHILS # BLD AUTO: 0.06 10*3/MM3 (ref 0–0.2)
BASOPHILS NFR BLD AUTO: 1.3 % (ref 0–1.5)
BILIRUB SERPL-MCNC: 0.4 MG/DL (ref 0–1.2)
BUN SERPL-MCNC: 22 MG/DL (ref 6–20)
BUN/CREAT SERPL: 22.4 (ref 7–25)
CALCIUM SERPL-MCNC: 9.9 MG/DL (ref 8.6–10.5)
CHLORIDE SERPL-SCNC: 105 MMOL/L (ref 98–107)
CHOLEST SERPL-MCNC: 194 MG/DL (ref 0–200)
CLAM IGE QN: <0.1 KU/L
CO2 SERPL-SCNC: 29.6 MMOL/L (ref 22–29)
CODFISH IGE QN: <0.1 KU/L
CONV CLASS DESCRIPTION: NORMAL
CORN IGE QN: <0.1 KU/L
COW MILK IGE QN: <0.1 KU/L
CREAT SERPL-MCNC: 0.98 MG/DL (ref 0.57–1)
EGG WHITE IGE QN: <0.1 KU/L
ENDOMYSIUM IGA SER QL: NEGATIVE
EOSINOPHIL # BLD AUTO: 0.14 10*3/MM3 (ref 0–0.4)
EOSINOPHIL NFR BLD AUTO: 3 % (ref 0.3–6.2)
ERYTHROCYTE [DISTWIDTH] IN BLOOD BY AUTOMATED COUNT: 12.1 % (ref 12.3–15.4)
GLIADIN PEPTIDE IGA SER-ACNC: 5 UNITS (ref 0–19)
GLIADIN PEPTIDE IGG SER-ACNC: 2 UNITS (ref 0–19)
GLOBULIN SER CALC-MCNC: 2.3 GM/DL
GLUCOSE SERPL-MCNC: 92 MG/DL (ref 65–99)
HCT VFR BLD AUTO: 40.9 % (ref 34–46.6)
HDLC SERPL-MCNC: 99 MG/DL (ref 40–60)
HGB BLD-MCNC: 13.7 G/DL (ref 12–15.9)
IGA SERPL-MCNC: 171 MG/DL (ref 87–352)
IMM GRANULOCYTES # BLD AUTO: 0.01 10*3/MM3 (ref 0–0.05)
IMM GRANULOCYTES NFR BLD AUTO: 0.2 % (ref 0–0.5)
LDLC SERPL CALC-MCNC: 81 MG/DL (ref 0–100)
LDLC/HDLC SERPL: 0.8 {RATIO}
LYMPHOCYTES # BLD AUTO: 1.46 10*3/MM3 (ref 0.7–3.1)
LYMPHOCYTES NFR BLD AUTO: 31 % (ref 19.6–45.3)
MCH RBC QN AUTO: 31.2 PG (ref 26.6–33)
MCHC RBC AUTO-ENTMCNC: 33.5 G/DL (ref 31.5–35.7)
MCV RBC AUTO: 93.2 FL (ref 79–97)
MONOCYTES # BLD AUTO: 0.51 10*3/MM3 (ref 0.1–0.9)
MONOCYTES NFR BLD AUTO: 10.8 % (ref 5–12)
NEUTROPHILS # BLD AUTO: 2.53 10*3/MM3 (ref 1.7–7)
NEUTROPHILS NFR BLD AUTO: 53.7 % (ref 42.7–76)
NRBC BLD AUTO-RTO: 0 /100 WBC (ref 0–0.2)
PEANUT IGE QN: <0.1 KU/L
PLATELET # BLD AUTO: 289 10*3/MM3 (ref 140–450)
POTASSIUM SERPL-SCNC: 4.9 MMOL/L (ref 3.5–5.2)
PROT SERPL-MCNC: 6.8 G/DL (ref 6–8.5)
RBC # BLD AUTO: 4.39 10*6/MM3 (ref 3.77–5.28)
SCALLOP IGE QN: <0.1 KU/L
SESAME SEED IGE QN: <0.1 KU/L
SHRIMP IGE QN: <0.1 KU/L
SODIUM SERPL-SCNC: 142 MMOL/L (ref 136–145)
SOYBEAN IGE QN: <0.1 KU/L
TRIGL SERPL-MCNC: 81 MG/DL (ref 0–150)
TTG IGA SER-ACNC: <2 U/ML (ref 0–3)
TTG IGG SER-ACNC: <2 U/ML (ref 0–5)
UREA BREATH TEST QL: NEGATIVE
VLDLC SERPL CALC-MCNC: 14 MG/DL (ref 5–40)
WALNUT IGE QN: <0.1 KU/L
WBC # BLD AUTO: 4.71 10*3/MM3 (ref 3.4–10.8)
WHEAT IGE QN: <0.1 KU/L

## 2021-08-26 DIAGNOSIS — F51.01 PRIMARY INSOMNIA: ICD-10-CM

## 2021-08-26 RX ORDER — ZOLPIDEM TARTRATE 12.5 MG/1
TABLET, FILM COATED, EXTENDED RELEASE ORAL
Qty: 90 TABLET | Refills: 0 | Status: SHIPPED | OUTPATIENT
Start: 2021-08-26 | End: 2021-12-10 | Stop reason: SDUPTHER

## 2021-08-26 NOTE — TELEPHONE ENCOUNTER
Rx Refill Note  Requested Prescriptions     Pending Prescriptions Disp Refills   • zolpidem CR (AMBIEN CR) 12.5 MG CR tablet [Pharmacy Med Name: Zolpidem Tartrate ER Oral Tablet Extended Release 12.5 MG] 90 tablet 0     Sig: TAKE 1 TABLET BY MOUTH EVERY DAY AT NIGHT AS NEEDED FOR SLEEP      Last office visit with prescribing clinician: 8/2/2021      Next office visit with prescribing clinician: Visit date not found            Yessy Canales MA/ZOHREH  08/26/21, 08:20 EDT

## 2021-09-07 RX ORDER — ALIROCUMAB 75 MG/ML
INJECTION, SOLUTION SUBCUTANEOUS
Qty: 6 ML | Refills: 0 | Status: SHIPPED | OUTPATIENT
Start: 2021-09-07 | End: 2021-11-24

## 2021-09-14 ENCOUNTER — TELEPHONE (OUTPATIENT)
Dept: GASTROENTEROLOGY | Facility: CLINIC | Age: 61
End: 2021-09-14

## 2021-09-14 ENCOUNTER — OFFICE VISIT (OUTPATIENT)
Dept: GASTROENTEROLOGY | Facility: CLINIC | Age: 61
End: 2021-09-14

## 2021-09-14 VITALS — WEIGHT: 128 LBS | BODY MASS INDEX: 25.13 KG/M2 | HEIGHT: 60 IN | TEMPERATURE: 97.5 F

## 2021-09-14 DIAGNOSIS — Z12.11 ENCOUNTER FOR SCREENING FOR MALIGNANT NEOPLASM OF COLON: ICD-10-CM

## 2021-09-14 DIAGNOSIS — K59.04 CHRONIC IDIOPATHIC CONSTIPATION: Primary | ICD-10-CM

## 2021-09-14 PROCEDURE — 99203 OFFICE O/P NEW LOW 30 MIN: CPT | Performed by: INTERNAL MEDICINE

## 2021-09-14 RX ORDER — DOXYCYCLINE 50 MG/1
50 TABLET ORAL DAILY
COMMUNITY
Start: 2021-08-10 | End: 2021-09-14

## 2021-09-14 RX ORDER — LOTEPREDNOL ETABONATE 5 MG/G
GEL OPHTHALMIC
COMMUNITY
Start: 2021-08-26 | End: 2021-09-14

## 2021-09-14 NOTE — PROGRESS NOTES
Subjective   Chief Complaint   Patient presents with   • Abdominal Pain   • Gas       Taty Garcia is a  61 y.o. female here for abdominal pain, gas.    She reports a long history of epigastric pain after eating.  She reports that is relieved by having a bowel movement or passing gas.  Seems to be worse with certain foods especially restaurant meals.  Often times she will have numerous solid bowel movements after this occurs.  She has regular bowel movements daily but does not always feel like she goes well.  No blood in the stool.  She had a normal colonoscopy 9 or 10 years ago.  No nausea or vomiting.    No FH CRC/polyps.  HPI  Past Medical History:   Diagnosis Date   • Allergic     bronchitis, sinustitis   • Anxiety    • Arthritis    • Cervical strain 10/11/2018   • Cervicalgia    • Chest pain    • Coronary artery disease    • Depression    • Gastroesophageal reflux disease without esophagitis 8/2/2021   • History of coronary artery stent placement 6/28/2018   • Hyperlipidemia 3/29/2017   • Hypertension    • Insomnia    • SOB (shortness of breath)    • Tendonitis    • UTI (urinary tract infection)      Past Surgical History:   Procedure Laterality Date   • BREAST SURGERY      cosmetic procedures   • CARDIAC CATHETERIZATION     • CARDIAC CATHETERIZATION     • COLONOSCOPY  approx 2013   • HYSTERECTOMY     • NECK SURGERY     • SPINE SURGERY      spinal fusion of cervical region   • SUBTOTAL HYSTERECTOMY         Current Outpatient Medications:   •  aspirin 81 MG EC tablet, Take 81 mg by mouth Daily., Disp: , Rfl:   •  Biotin 1 MG capsule, Take 1 tablet by mouth Daily., Disp: , Rfl:   •  cetirizine (zyrTEC) 10 MG tablet, Take 10 mg by mouth Daily., Disp: , Rfl:   •  Cholecalciferol (VITAMIN D-3) 1000 UNITS capsule, Take 1,000 Units by mouth Daily., Disp: , Rfl:   •  cyclobenzaprine (FLEXERIL) 5 MG tablet, TAKE 1 TABLET BY MOUTH THREE TIMES A DAY , Disp: 90 tablet, Rfl: 0  •  diclofenac (VOLTAREN) 50 MG EC tablet,  TAKE 1 TABLET BY MOUTH TWO TIMES A DAY , Disp: 180 tablet, Rfl: 3  •  Multiple Minerals-Vitamins (CALCIUM CITRATE +) tablet, Take 1 tablet by mouth Daily., Disp: , Rfl:   •  nebivolol (Bystolic) 5 MG tablet, Take one tablet 3 times a week, Disp: 30 tablet, Rfl: 0  •  omeprazole (priLOSEC) 40 MG capsule, TAKE 1 CAPSULE BY MOUTH EVERY DAY for 4 weeks, Disp: , Rfl:   •  Praluent 75 MG/ML solution auto-injector, inject 75mg into the skin of the appropriate area as directed every 14 days, Disp: 6 mL, Rfl: 0  •  RESTASIS 0.05 % ophthalmic emulsion, Administer 1 drop to both eyes Every 12 (Twelve) Hours., Disp: 30 each, Rfl: 6  •  tobramycin-dexamethasone (TOBRADEX) 0.3-0.1 % ophthalmic suspension, INSTILL 1 DROP INTO BOTH EYES THREE TIMES A DAY FOR 1 WEEK AS DIRECTED, Disp: , Rfl:   •  zolpidem CR (AMBIEN CR) 12.5 MG CR tablet, TAKE 1 TABLET BY MOUTH EVERY DAY AT NIGHT AS NEEDED FOR SLEEP, Disp: 90 tablet, Rfl: 0  PRN Meds:.  Allergies   Allergen Reactions   • Codeine Itching   • Lipitor [Atorvastatin] Myalgia     Other reaction(s): Myalgia   • Oxycodone-Acetaminophen Itching   • Praluent [Alirocumab] Itching   • Pravachol [Pravastatin Sodium] Myalgia   • Pravastatin Myalgia     Other reaction(s): Myalgia     Social History     Socioeconomic History   • Marital status:      Spouse name: Not on file   • Number of children: Not on file   • Years of education: Not on file   • Highest education level: Not on file   Tobacco Use   • Smoking status: Never Smoker   • Smokeless tobacco: Never Used   • Tobacco comment: CAFFEINE USE- 2 GLASSES TEA DAILY   Substance and Sexual Activity   • Alcohol use: Yes     Alcohol/week: 2.0 standard drinks     Types: 1 Glasses of wine, 1 Shots of liquor per week     Comment: occ   • Drug use: No   • Sexual activity: Defer     Family History   Problem Relation Age of Onset   • Cancer Mother         breast cancer   • Arthritis Mother    • Stroke Mother    • Pancreatitis Mother    • Heart  disease Father      Review of Systems   Constitutional: Negative for appetite change and unexpected weight change.   Gastrointestinal: Positive for abdominal pain and constipation.     Vitals:    09/14/21 1613   Temp: 97.5 °F (36.4 °C)         09/14/21  1613   Weight: 58.1 kg (128 lb)       Objective   Physical Exam  Constitutional:       Appearance: Normal appearance.   Abdominal:      General: There is no distension.      Palpations: Abdomen is soft.   Neurological:      Mental Status: She is alert.       No radiology results for the last 7 days    Assessment/Plan   Diagnoses and all orders for this visit:    Chronic idiopathic constipation    Encounter for screening for malignant neoplasm of colon  -     Case Request; Standing  -     Case Request    Other orders  -     Discontinue: doxycycline (ADOXA) 50 MG tablet; Take 50 mg by mouth Daily.  -     Discontinue: Lotemax 0.5 % gel ophthalmic gel; Instill 1 drop into both eyes 4 times a day for 1 week, then 1 drop 2 times a day for 1 week  -     Follow Anesthesia Guidelines / Standing Orders; Future  -     Obtain Informed Consent; Future  -     Implement Anesthesia orders day of procedure.; Standing  -     Obtain informed consent; Standing  -     Verify bowel prep was successful; Standing  -     Give tap water enema if bowel prep was insufficient; Standing      Plan:  · Start benefiber daily-I think her symptoms are related to mild constipation.  If we can improve her colonic emptying I think that some of her secondary symptoms with's subside.  · Discussed diet lifestyle modification to reduce symptoms.  · She is due for screening colonoscopy and we will schedule this at her convenience.

## 2021-09-14 NOTE — TELEPHONE ENCOUNTER
SPOKE WITH PT IN OFFICE SCHEDULED AT [facility] ON DEC 20 ARRIVE  APM SKYLER ROBINS----DHRUV

## 2021-09-16 DIAGNOSIS — M19.90 ARTHRITIS: ICD-10-CM

## 2021-09-17 NOTE — TELEPHONE ENCOUNTER
Rx Refill Note  Requested Prescriptions     Pending Prescriptions Disp Refills   • cyclobenzaprine (FLEXERIL) 5 MG tablet [Pharmacy Med Name: Cyclobenzaprine HCl Oral Tablet 5 MG] 90 tablet 0     Sig: TAKE 1 TABLET BY MOUTH THREE TIMES A DAY      Last office visit with prescribing clinician: 8/2/2021      Next office visit with prescribing clinician: Visit date not found            Pawel Dunbar MA  09/17/21, 07:57 EDT

## 2021-09-20 ENCOUNTER — TELEPHONE (OUTPATIENT)
Dept: FAMILY MEDICINE CLINIC | Facility: CLINIC | Age: 61
End: 2021-09-20

## 2021-09-20 RX ORDER — CYCLOBENZAPRINE HCL 5 MG
TABLET ORAL
Qty: 90 TABLET | Refills: 0 | Status: SHIPPED | OUTPATIENT
Start: 2021-09-20 | End: 2021-12-21 | Stop reason: SDUPTHER

## 2021-09-20 NOTE — TELEPHONE ENCOUNTER
Caller: Taty Garcia    Relationship: Self    Best call back number: [phone]  What is the best time to reach you:ANYTIME LEAVE MESSAGE    Who are you requesting to speak with (clinical staff, provider,  specific staff member): CLINICAL    Do you know the name of the person who called: GOLDEN    What was the call regarding: BYSTOLIC 5 MG , PATIENT IS OUT OF THIS AND HER INSURANCE DOES NOT COVER , DO YOU HAVE SAMPLES?    Do you require a callback: YES

## 2021-11-24 RX ORDER — ALIROCUMAB 75 MG/ML
INJECTION, SOLUTION SUBCUTANEOUS
Qty: 6 ML | Refills: 0 | Status: SHIPPED | OUTPATIENT
Start: 2021-11-24 | End: 2022-02-17

## 2021-12-08 DIAGNOSIS — F51.01 PRIMARY INSOMNIA: ICD-10-CM

## 2021-12-08 NOTE — TELEPHONE ENCOUNTER
Rx Refill Note  Requested Prescriptions     Pending Prescriptions Disp Refills   • zolpidem CR (AMBIEN CR) 12.5 MG CR tablet [Pharmacy Med Name: Zolpidem Tartrate ER Oral Tablet Extended Release 12.5 MG] 90 tablet 0     Sig: TAKE 1 TABLET BY MOUTH AT NIGHT AS NEEDED FOR SLEEP      Last office visit with prescribing clinician: 8/2/2021      Next office visit with prescribing clinician: Visit date not found            Pawel Dunbar MA  12/08/21, 08:11 EST

## 2021-12-09 RX ORDER — ZOLPIDEM TARTRATE 12.5 MG/1
TABLET, FILM COATED, EXTENDED RELEASE ORAL
Qty: 90 TABLET | Refills: 0 | OUTPATIENT
Start: 2021-12-09

## 2021-12-10 DIAGNOSIS — F51.01 PRIMARY INSOMNIA: ICD-10-CM

## 2021-12-10 NOTE — TELEPHONE ENCOUNTER
Patient upset we have not filled her ambien told her she needs to be seen every 3 months for this Rx she did schedule appointment wants us to ask again

## 2021-12-13 RX ORDER — ZOLPIDEM TARTRATE 12.5 MG/1
12.5 TABLET, FILM COATED, EXTENDED RELEASE ORAL NIGHTLY PRN
Qty: 30 TABLET | Refills: 0 | Status: SHIPPED | OUTPATIENT
Start: 2021-12-13 | End: 2022-01-19

## 2021-12-20 ENCOUNTER — ANESTHESIA (OUTPATIENT)
Dept: GASTROENTEROLOGY | Facility: HOSPITAL | Age: 61
End: 2021-12-20

## 2021-12-20 ENCOUNTER — ANESTHESIA EVENT (OUTPATIENT)
Dept: GASTROENTEROLOGY | Facility: HOSPITAL | Age: 61
End: 2021-12-20

## 2021-12-20 ENCOUNTER — HOSPITAL ENCOUNTER (OUTPATIENT)
Facility: HOSPITAL | Age: 61
Setting detail: HOSPITAL OUTPATIENT SURGERY
Discharge: HOME | End: 2021-12-20
Attending: INTERNAL MEDICINE | Admitting: INTERNAL MEDICINE

## 2021-12-20 VITALS
HEART RATE: 66 BPM | HEIGHT: 66 IN | WEIGHT: 123.2 LBS | OXYGEN SATURATION: 100 % | SYSTOLIC BLOOD PRESSURE: 139 MMHG | TEMPERATURE: 97.8 F | DIASTOLIC BLOOD PRESSURE: 76 MMHG | BODY MASS INDEX: 19.8 KG/M2 | RESPIRATION RATE: 18 BRPM

## 2021-12-20 DIAGNOSIS — Z12.11 ENCOUNTER FOR SCREENING FOR MALIGNANT NEOPLASM OF COLON: ICD-10-CM

## 2021-12-20 PROCEDURE — 88305 TISSUE EXAM BY PATHOLOGIST: CPT | Performed by: INTERNAL MEDICINE

## 2021-12-20 PROCEDURE — S0260 H&P FOR SURGERY: HCPCS | Performed by: INTERNAL MEDICINE

## 2021-12-20 PROCEDURE — 25010000002 PROPOFOL 10 MG/ML EMULSION: Performed by: STUDENT IN AN ORGANIZED HEALTH CARE EDUCATION/TRAINING PROGRAM

## 2021-12-20 PROCEDURE — 45380 COLONOSCOPY AND BIOPSY: CPT | Performed by: INTERNAL MEDICINE

## 2021-12-20 RX ORDER — LIDOCAINE HYDROCHLORIDE 20 MG/ML
INJECTION, SOLUTION INFILTRATION; PERINEURAL AS NEEDED
Status: DISCONTINUED | OUTPATIENT
Start: 2021-12-20 | End: 2021-12-20 | Stop reason: SURG

## 2021-12-20 RX ORDER — SODIUM CHLORIDE 0.9 % (FLUSH) 0.9 %
10 SYRINGE (ML) INJECTION AS NEEDED
Status: DISCONTINUED | OUTPATIENT
Start: 2021-12-20 | End: 2021-12-20 | Stop reason: HOSPADM

## 2021-12-20 RX ORDER — SODIUM CHLORIDE, SODIUM LACTATE, POTASSIUM CHLORIDE, CALCIUM CHLORIDE 600; 310; 30; 20 MG/100ML; MG/100ML; MG/100ML; MG/100ML
30 INJECTION, SOLUTION INTRAVENOUS CONTINUOUS PRN
Status: DISCONTINUED | OUTPATIENT
Start: 2021-12-20 | End: 2021-12-20 | Stop reason: HOSPADM

## 2021-12-20 RX ORDER — PROPOFOL 10 MG/ML
VIAL (ML) INTRAVENOUS AS NEEDED
Status: DISCONTINUED | OUTPATIENT
Start: 2021-12-20 | End: 2021-12-20 | Stop reason: SURG

## 2021-12-20 RX ORDER — SODIUM CHLORIDE 0.9 % (FLUSH) 0.9 %
3 SYRINGE (ML) INJECTION EVERY 12 HOURS SCHEDULED
Status: DISCONTINUED | OUTPATIENT
Start: 2021-12-20 | End: 2021-12-20 | Stop reason: HOSPADM

## 2021-12-20 RX ADMIN — PROPOFOL 160 MCG/KG/MIN: 10 INJECTION, EMULSION INTRAVENOUS at 16:24

## 2021-12-20 RX ADMIN — LIDOCAINE HYDROCHLORIDE 60 MG: 20 INJECTION, SOLUTION INFILTRATION; PERINEURAL at 16:23

## 2021-12-20 RX ADMIN — PROPOFOL 80 MG: 10 INJECTION, EMULSION INTRAVENOUS at 16:23

## 2021-12-20 RX ADMIN — SODIUM CHLORIDE, POTASSIUM CHLORIDE, SODIUM LACTATE AND CALCIUM CHLORIDE 30 ML/HR: 600; 310; 30; 20 INJECTION, SOLUTION INTRAVENOUS at 14:50

## 2021-12-20 NOTE — H&P
[facility]  Pre Procedure History & Physical    Chief Complaint:   screening    Subjective     HPI:   62 yo here today for colonoscopy.  Pt reports no FH CRC/polyps.  Patient denies GI symptoms currrently.  Last exam approximately 2013    Past Medical History:   Past Medical History:   Diagnosis Date   • Allergic     bronchitis, sinustitis   • Anxiety    • Arthritis    • Cervical strain 10/11/2018   • Cervicalgia    • Chest pain    • Coronary artery disease    • Depression    • Gastroesophageal reflux disease without esophagitis 8/2/2021   • History of coronary artery stent placement 6/28/2018   • Hyperlipidemia 3/29/2017   • Hypertension    • Insomnia    • SOB (shortness of breath)    • Tendonitis    • UTI (urinary tract infection)        Past Surgical History:  Past Surgical History:   Procedure Laterality Date   • BREAST SURGERY      cosmetic procedures   • CARDIAC CATHETERIZATION     • CARDIAC CATHETERIZATION     • COLONOSCOPY  approx 2013   • HYSTERECTOMY     • NECK SURGERY     • SPINE SURGERY      spinal fusion of cervical region   • SUBTOTAL HYSTERECTOMY         Family History:  Family History   Problem Relation Age of Onset   • Cancer Mother         breast cancer   • Arthritis Mother    • Stroke Mother    • Pancreatitis Mother    • Heart disease Father        Social History:   reports that she has never smoked. She has never used smokeless tobacco. She reports current alcohol use of about 2.0 standard drinks of alcohol per week. She reports that she does not use drugs.    Medications:   Medications Prior to Admission   Medication Sig Dispense Refill Last Dose   • aspirin 81 MG EC tablet Take 81 mg by mouth Daily.   12/19/2021 at Unknown time   • Biotin 1 MG capsule Take 1 tablet by mouth Daily.   12/19/2021 at Unknown time   • cetirizine (zyrTEC) 10 MG tablet Take 10 mg by mouth Daily.   12/19/2021 at Unknown time   • Cholecalciferol (VITAMIN D-3) 1000 UNITS capsule Take 1,000 Units  "by mouth Daily.   12/19/2021 at Unknown time   • cyclobenzaprine (FLEXERIL) 5 MG tablet TAKE 1 TABLET BY MOUTH THREE TIMES A DAY  90 tablet 0 12/19/2021 at Unknown time   • diclofenac (VOLTAREN) 50 MG EC tablet TAKE 1 TABLET BY MOUTH TWO TIMES A DAY  180 tablet 3 12/19/2021 at Unknown time   • Multiple Minerals-Vitamins (CALCIUM CITRATE +) tablet Take 1 tablet by mouth Daily.   Past Week at Unknown time   • nebivolol (Bystolic) 5 MG tablet Take one tablet 3 times a week 30 tablet 0 12/20/2021 at Unknown time   • omeprazole (priLOSEC) 40 MG capsule TAKE 1 CAPSULE BY MOUTH EVERY DAY for 4 weeks   12/19/2021 at Unknown time   • Praluent 75 MG/ML solution auto-injector INJECT 75 MG INTO THE SKIN OF THE APPROPRIATE AREA AS DIRECTED EVERY 14 DAYS 6 mL 0 Past Week at Unknown time   • zolpidem CR (AMBIEN CR) 12.5 MG CR tablet Take 1 tablet by mouth At Night As Needed for Sleep. 30 tablet 0 12/19/2021 at Unknown time   • RESTASIS 0.05 % ophthalmic emulsion Administer 1 drop to both eyes Every 12 (Twelve) Hours. 30 each 6 Unknown at Unknown time   • tobramycin-dexamethasone (TOBRADEX) 0.3-0.1 % ophthalmic suspension INSTILL 1 DROP INTO BOTH EYES THREE TIMES A DAY FOR 1 WEEK AS DIRECTED   Unknown at Unknown time       Allergies:  Codeine, Lipitor [atorvastatin], Oxycodone-acetaminophen, Praluent [alirocumab], Pravachol [pravastatin sodium], and Pravastatin    ROS:    Pertinent items are noted in HPI, all other systems reviewed and negative     Objective     Blood pressure 149/80, pulse 76, temperature 97.8 °F (36.6 °C), temperature source Oral, resp. rate 16, height 166.4 cm (65.5\"), weight 55.9 kg (123 lb 3.2 oz), SpO2 100 %.    Physical Exam   Constitutional: Pt is oriented to person, place, and time and well-developed, well-nourished, and in no distress.   Mouth/Throat: Oropharynx is clear and moist.   Neck: Normal range of motion.   Cardiovascular: Normal rate, regular rhythm    Pulmonary/Chest: Effort normal    Abdominal: " Soft. Nontender  Skin: Skin is warm and dry.   Psychiatric: Mood, memory, affect and judgment normal.     Assessment/Plan     Diagnosis:  Screening for colon cancer    Anticipated Surgical Procedure:  colonoscopy    The risks, benefits, and alternatives of this procedure have been discussed with the patient or the responsible party- the patient understands and agrees to proceed.

## 2021-12-20 NOTE — ANESTHESIA POSTPROCEDURE EVALUATION
Patient: Taty Garcia    Procedure Summary     Date: 12/20/21 Room / Location:  [facility] /  [facility]    Anesthesia Start: 1619 Anesthesia Stop: 1648    Procedure: COLONOSCOPY to cecum with cold polypectomy (N/A ) Diagnosis:       Encounter for screening for malignant neoplasm of colon      (Encounter for screening for malignant neoplasm of colon [Z12.11])    Surgeons: Cleo Bridges MD Provider: Fawad Farrell MD    Anesthesia Type: MAC ASA Status: 3          Anesthesia Type: MAC    Vitals  No vitals data found for the desired time range.          Post Anesthesia Care and Evaluation    Patient location during evaluation: PHASE II  Anesthetic complications: No anesthetic complications

## 2021-12-20 NOTE — ANESTHESIA PREPROCEDURE EVALUATION
Anesthesia Evaluation     Patient summary reviewed and Nursing notes reviewed   no history of anesthetic complications:  NPO Solid Status: > 8 hours  NPO Liquid Status: > 2 hours           Airway   Mallampati: II  TM distance: >3 FB  Neck ROM: full  Dental      Pulmonary    Cardiovascular     (+) hypertension, CAD, cardiac stents hyperlipidemia,     ROS comment: Stress echo reviewed    Neuro/Psych  GI/Hepatic/Renal/Endo    (+)  GERD,      Musculoskeletal     Abdominal    Substance History      OB/GYN          Other   arthritis,                      Anesthesia Plan    ASA 3     MAC     intravenous induction     Anesthetic plan, all risks, benefits, and alternatives have been provided, discussed and informed consent has been obtained with: patient.

## 2021-12-21 ENCOUNTER — OFFICE VISIT (OUTPATIENT)
Dept: FAMILY MEDICINE CLINIC | Facility: CLINIC | Age: 61
End: 2021-12-21

## 2021-12-21 VITALS
DIASTOLIC BLOOD PRESSURE: 70 MMHG | HEART RATE: 79 BPM | WEIGHT: 124.4 LBS | SYSTOLIC BLOOD PRESSURE: 124 MMHG | TEMPERATURE: 98 F | BODY MASS INDEX: 19.99 KG/M2 | OXYGEN SATURATION: 98 % | HEIGHT: 66 IN

## 2021-12-21 DIAGNOSIS — R31.9 URINARY TRACT INFECTION WITH HEMATURIA, SITE UNSPECIFIED: ICD-10-CM

## 2021-12-21 DIAGNOSIS — F51.01 PRIMARY INSOMNIA: Primary | ICD-10-CM

## 2021-12-21 DIAGNOSIS — Z79.899 HIGH RISK MEDICATION USE: ICD-10-CM

## 2021-12-21 DIAGNOSIS — M19.90 ARTHRITIS: ICD-10-CM

## 2021-12-21 DIAGNOSIS — N39.0 URINARY TRACT INFECTION WITH HEMATURIA, SITE UNSPECIFIED: ICD-10-CM

## 2021-12-21 LAB
BILIRUB BLD-MCNC: NEGATIVE MG/DL
CLARITY, POC: CLEAR
COLOR UR: YELLOW
EXPIRATION DATE: ABNORMAL
GLUCOSE UR STRIP-MCNC: NEGATIVE MG/DL
KETONES UR QL: NEGATIVE
LEUKOCYTE EST, POC: NEGATIVE
Lab: ABNORMAL
NITRITE UR-MCNC: NEGATIVE MG/ML
PH UR: 5.5 [PH] (ref 5–8)
PROT UR STRIP-MCNC: ABNORMAL MG/DL
RBC # UR STRIP: ABNORMAL /UL
SP GR UR: 1.03 (ref 1–1.03)
UROBILINOGEN UR QL: NORMAL

## 2021-12-21 PROCEDURE — 99214 OFFICE O/P EST MOD 30 MIN: CPT | Performed by: NURSE PRACTITIONER

## 2021-12-21 PROCEDURE — 81003 URINALYSIS AUTO W/O SCOPE: CPT | Performed by: NURSE PRACTITIONER

## 2021-12-21 RX ORDER — LEVOCETIRIZINE DIHYDROCHLORIDE 5 MG/1
5 TABLET, FILM COATED ORAL DAILY
COMMUNITY
Start: 2021-12-08 | End: 2022-11-16

## 2021-12-21 RX ORDER — CYCLOBENZAPRINE HCL 5 MG
5 TABLET ORAL 3 TIMES DAILY
Qty: 90 TABLET | Refills: 0 | Status: SHIPPED | OUTPATIENT
Start: 2021-12-21 | End: 2022-03-23

## 2021-12-21 RX ORDER — SULFAMETHOXAZOLE AND TRIMETHOPRIM 800; 160 MG/1; MG/1
1 TABLET ORAL 2 TIMES DAILY
Qty: 10 TABLET | Refills: 0 | Status: SHIPPED | OUTPATIENT
Start: 2021-12-21 | End: 2022-03-07

## 2021-12-21 NOTE — PROGRESS NOTES
Chief Complaint  Insomnia (Patient is needing her ambien refilled needs drug screen ), Spasms (Patient needs her cyclobenzaprine refilled ), and Urinary Frequency (Patient is wanting to be checked for UTI )    Subjective          Taty Garcia presents to [facility]  History of Present Illness  Taty Garcia 61 y.o. female presents for follow up of insomnia with onset of symptoms years ago. Patient describes symptoms as early morning awakening and frequent night time awakening. Patient has found complete relief with Ambien (Zolpidem). Associated symptoms include: fatigue if unable to take Rx . Patient denies daytime somnolence Symptoms have stabilized.  The patient has failed multiple OTC medications for insomnia.  They are well controlled on current Rx and will continue to try to take Rx PRN.  She will use the lowest effective dose.  The patient has read and signed the [facility] Controlled Substance Contract.  I will continue to see patient for regular follow up appointments and be prescribed the lowest effective dose.  CALEB has been reviewed by me and is updated every 3 months. The patient is aware of the potential for addiction and dependence.  She denies that Ambien (Zolpidem) causes excessive daytime drowsiness and sleep walking.  Patient voices understanding to take Ambien (Zolpidem) and go straight to bed. Patient must be able to sleep 7 hours or more when taking this and no alcohol.  Patient will hold Rx and contact me if they experience any impaired mental alertness the next day.      Patient is also requesting refill of Flexeril's which she uses for a as needed basis for muscle spasms only in her neck and her back.  Medications works well for side effects.    Patient just went through the prep and colonoscopy yesterday she woke up this morning with severe pain with urination and feels like she cannot empty her bladder.  She is unable to go in the office today so  "I was not able to get much of a sample but the sample we got did show a large amount of blood.  Also because of this I am not able to do the drug screen so we can repeat that at the next visit.    Objective   Vital Signs:   /70   Pulse 79   Temp 98 °F (36.7 °C)   Ht 166.4 cm (65.51\")   Wt 56.4 kg (124 lb 6.4 oz)   SpO2 98%   BMI 20.38 kg/m²     Physical Exam  Vitals reviewed.   Constitutional:       General: She is not in acute distress.     Appearance: She is well-developed.   HENT:      Head: Normocephalic.   Cardiovascular:      Rate and Rhythm: Normal rate and regular rhythm.      Heart sounds: Normal heart sounds.   Pulmonary:      Effort: Pulmonary effort is normal.      Breath sounds: Normal breath sounds.   Neurological:      Mental Status: She is alert and oriented to person, place, and time.      Gait: Gait normal.   Psychiatric:         Behavior: Behavior normal.         Thought Content: Thought content normal.         Judgment: Judgment normal.        Result Review :   The following data was reviewed by: YEHUDA Pineda on 12/21/2021:  CMP    CMP 8/5/21   Glucose 92   BUN 22 (A)   Creatinine 0.98   eGFR Non  Am 58 (A)   eGFR African Am 71   Sodium 142   Potassium 4.9   Chloride 105   Calcium 9.9   Total Protein 6.8   Albumin 4.50   Globulin 2.3   Total Bilirubin 0.4   Alkaline Phosphatase 137 (A)   AST (SGOT) 17   ALT (SGPT) 15   (A) Abnormal value       Comments are available for some flowsheets but are not being displayed.           CBC w/diff    CBC w/Diff 8/5/21   WBC 4.71   RBC 4.39   Hemoglobin 13.7   Hematocrit 40.9   MCV 93.2   MCH 31.2   MCHC 33.5   RDW 12.1 (A)   Platelets 289   Neutrophil Rel % 53.7   Lymphocyte Rel % 31.0   Monocyte Rel % 10.8   Eosinophil Rel % 3.0   Basophil Rel % 1.3   (A) Abnormal value            Lipid Panel    Lipid Panel 8/5/21   Total Cholesterol 194   Triglycerides 81   HDL Cholesterol 99 (A)   VLDL Cholesterol 14   LDL Cholesterol  81   LDL/HDL " Ratio 0.80   (A) Abnormal value       Comments are available for some flowsheets but are not being displayed.                             Assessment and Plan    Diagnoses and all orders for this visit:    1. Primary insomnia (Primary)    2. Arthritis  -     cyclobenzaprine (FLEXERIL) 5 MG tablet; Take 1 tablet by mouth 3 (Three) Times a Day.  Dispense: 90 tablet; Refill: 0    3. High risk medication use  -     ToxASSURE Select 13 (MW) - Urine, Clean Catch    4. Urinary tract infection with hematuria, site unspecified  -     sulfamethoxazole-trimethoprim (Bactrim DS) 800-160 MG per tablet; Take 1 tablet by mouth 2 (Two) Times a Day.  Dispense: 10 tablet; Refill: 0        Follow Up   Return in about 3 months (around 3/21/2022) for Recheck.  Patient was given instructions and counseling regarding her condition or for health maintenance advice. Please see specific information pulled into the AVS if appropriate.     rto in 3 mons   Continue same with medications.  I am to go ahead and treat her for her symptoms of a urinary tract infection at this does not work that she does need return to the office to be able to give a urine sample  Mask and googles worn

## 2021-12-22 LAB
LAB AP CASE REPORT: NORMAL
PATH REPORT.FINAL DX SPEC: NORMAL
PATH REPORT.GROSS SPEC: NORMAL

## 2021-12-30 ENCOUNTER — TELEPHONE (OUTPATIENT)
Dept: GASTROENTEROLOGY | Facility: CLINIC | Age: 61
End: 2021-12-30

## 2021-12-30 NOTE — TELEPHONE ENCOUNTER
----- Message from Cleo Bridges MD sent at 12/23/2021 10:17 AM EST -----  The polyp(s) showed hyperplastic change, which is non-cancerous and not pre-cancerous. Follow-up colonoscopy in 10 years is advised.

## 2021-12-30 NOTE — TELEPHONE ENCOUNTER
Called pt and left  for pt to call back.      C/s placed in [facility] and  for 12/20/2031.

## 2022-01-18 DIAGNOSIS — F51.01 PRIMARY INSOMNIA: ICD-10-CM

## 2022-01-19 ENCOUNTER — TELEPHONE (OUTPATIENT)
Dept: FAMILY MEDICINE CLINIC | Facility: CLINIC | Age: 62
End: 2022-01-19

## 2022-01-19 DIAGNOSIS — Z12.31 ENCOUNTER FOR SCREENING MAMMOGRAM FOR BREAST CANCER: Primary | ICD-10-CM

## 2022-01-19 NOTE — TELEPHONE ENCOUNTER
Rx Refill Note  Requested Prescriptions     Pending Prescriptions Disp Refills   • zolpidem CR (AMBIEN CR) 12.5 MG CR tablet [Pharmacy Med Name: Zolpidem Tartrate ER Oral Tablet Extended Release 12.5 MG] 30 tablet 0     Sig: TAKE 1 TABLET BY MOUTh at night AS NEEDED FOR SLEEP      Last office visit with prescribing clinician: 12/21/2021      Next office visit with prescribing clinician: 1/19/2022            Pawel Dunbar MA  01/19/22, 13:24 EST

## 2022-01-20 RX ORDER — ZOLPIDEM TARTRATE 12.5 MG/1
12.5 TABLET, FILM COATED, EXTENDED RELEASE ORAL NIGHTLY PRN
Qty: 30 TABLET | Refills: 0 | Status: SHIPPED | OUTPATIENT
Start: 2022-01-20 | End: 2022-03-07

## 2022-01-27 ENCOUNTER — HOSPITAL ENCOUNTER (OUTPATIENT)
Dept: MAMMOGRAPHY | Facility: HOSPITAL | Age: 62
Discharge: HOME | End: 2022-01-27
Admitting: NURSE PRACTITIONER

## 2022-01-27 DIAGNOSIS — Z12.31 ENCOUNTER FOR SCREENING MAMMOGRAM FOR BREAST CANCER: ICD-10-CM

## 2022-01-27 PROCEDURE — 77067 SCR MAMMO BI INCL CAD: CPT

## 2022-01-27 PROCEDURE — 77063 BREAST TOMOSYNTHESIS BI: CPT

## 2022-02-14 ENCOUNTER — TELEPHONE (OUTPATIENT)
Dept: FAMILY MEDICINE CLINIC | Facility: CLINIC | Age: 62
End: 2022-02-14

## 2022-02-14 NOTE — TELEPHONE ENCOUNTER
Tell her to get on youtube and search for epley maneuver and follow the visoe.  It should get rid of the vertigo

## 2022-02-14 NOTE — TELEPHONE ENCOUNTER
Caller: Taty Garcia    Relationship: Self    Best call back number: [phone]    What medication are you requesting:     What are your current symptoms: DIZZINESS/EARACHES    How long have you been experiencing symptoms: COUPLE DAYS     Have you had these symptoms before:    [x] Yes  [] No    Have you been treated for these symptoms before:   [] Yes  [] No    If a prescription is needed, what is your preferred pharmacy and phone number: [id number] [place] [id number] [facility] [phone] [facility] [phone]      Additional notes:PATIENT STATES SHE FLEW DOWN TO [place] AND STARTED FEELING LIKE SHE HAD VERTIGO. SHE SAID THAT STARTED ON Friday AND IT HAS NOT WENT AWAY. SHE HAS TAKEN MOTION SICKNESS MEDICATION BUT IT IS NOT HELPING EITHER

## 2022-02-17 RX ORDER — ALIROCUMAB 75 MG/ML
INJECTION, SOLUTION SUBCUTANEOUS
Qty: 6 ML | Refills: 0 | Status: SHIPPED | OUTPATIENT
Start: 2022-02-17 | End: 2022-05-26

## 2022-02-23 ENCOUNTER — TELEPHONE (OUTPATIENT)
Dept: FAMILY MEDICINE CLINIC | Facility: CLINIC | Age: 62
End: 2022-02-23

## 2022-02-23 NOTE — TELEPHONE ENCOUNTER
Caller: Taty Garcia    Relationship to patient: Self    Best call back number: [phone]    Patient is needing:PATIENT IS CALLING TO ASK IF THERE IS ANY SAMPLES OF BYSTOLIC. PLEASE ADVISE.

## 2022-03-07 ENCOUNTER — OFFICE VISIT (OUTPATIENT)
Dept: FAMILY MEDICINE CLINIC | Facility: CLINIC | Age: 62
End: 2022-03-07

## 2022-03-07 VITALS
BODY MASS INDEX: 21.49 KG/M2 | TEMPERATURE: 97.3 F | OXYGEN SATURATION: 99 % | HEART RATE: 90 BPM | SYSTOLIC BLOOD PRESSURE: 130 MMHG | HEIGHT: 65 IN | DIASTOLIC BLOOD PRESSURE: 68 MMHG | WEIGHT: 129 LBS

## 2022-03-07 DIAGNOSIS — I10 PRIMARY HYPERTENSION: Primary | ICD-10-CM

## 2022-03-07 DIAGNOSIS — F51.01 PRIMARY INSOMNIA: ICD-10-CM

## 2022-03-07 PROCEDURE — 99213 OFFICE O/P EST LOW 20 MIN: CPT | Performed by: NURSE PRACTITIONER

## 2022-03-07 RX ORDER — ZOLPIDEM TARTRATE 12.5 MG/1
TABLET, FILM COATED, EXTENDED RELEASE ORAL
Qty: 30 TABLET | Refills: 0 | Status: SHIPPED | OUTPATIENT
Start: 2022-03-07 | End: 2022-04-19

## 2022-03-07 RX ORDER — LISINOPRIL 5 MG/1
5 TABLET ORAL DAILY
Qty: 90 TABLET | Refills: 1 | Status: SHIPPED | OUTPATIENT
Start: 2022-03-07 | End: 2022-06-15

## 2022-03-07 NOTE — PROGRESS NOTES
"Chief Complaint  Insomnia (Refill on ambien)    Subjective          Taty Garcia presents to [facility]  History of Present Illness  Taty Garcia 61 y.o. female presents for follow up of insomnia with onset of symptoms years ago. Patient describes symptoms as early morning awakening and frequent night time awakening. Patient has found complete relief with Ambien (Zolpidem). Associated symptoms include: fatigue if unable to take Rx . Patient denies daytime somnolence Symptoms have stabilized.  The patient has failed multiple OTC medications for insomnia.  They are well controlled on current Rx and will continue to try to take Rx PRN.  She will use the lowest effective dose.  The patient has read and signed the [facility] Controlled Substance Contract.  I will continue to see patient for regular follow up appointments and be prescribed the lowest effective dose.  CALEB has been reviewed by me and is updated every 3 months. The patient is aware of the potential for addiction and dependence.  She denies that Ambien (Zolpidem) causes excessive daytime drowsiness and sleep walking.  Patient voices understanding to take Ambien (Zolpidem) and go straight to bed. Patient must be able to sleep 7 hours or more when taking this and no alcohol.  Patient will hold Rx and contact me if they experience any impaired mental alertness the next day.      HTN- insurance is not covering Bystolic any more requesting a different med.    Objective   Vital Signs:   /68   Pulse 90   Temp 97.3 °F (36.3 °C)   Ht 165.1 cm (65\")   Wt 58.5 kg (129 lb)   SpO2 99%   BMI 21.47 kg/m²     Physical Exam  Vitals reviewed.   Constitutional:       General: She is not in acute distress.     Appearance: She is well-developed.   HENT:      Head: Normocephalic.   Cardiovascular:      Rate and Rhythm: Normal rate and regular rhythm.      Heart sounds: Normal heart sounds.   Pulmonary:      Effort: " Pulmonary effort is normal.      Breath sounds: Normal breath sounds.   Neurological:      Mental Status: She is alert and oriented to person, place, and time.      Gait: Gait normal.   Psychiatric:         Behavior: Behavior normal.         Thought Content: Thought content normal.         Judgment: Judgment normal.        Result Review :   The following data was reviewed by: YEHUDA Pineda on 03/07/2022:  CMP    CMP 8/5/21   Glucose 92   BUN 22 (A)   Creatinine 0.98   eGFR Non  Am 58 (A)   eGFR African Am 71   Sodium 142   Potassium 4.9   Chloride 105   Calcium 9.9   Total Protein 6.8   Albumin 4.50   Globulin 2.3   Total Bilirubin 0.4   Alkaline Phosphatase 137 (A)   AST (SGOT) 17   ALT (SGPT) 15   (A) Abnormal value       Comments are available for some flowsheets but are not being displayed.           CBC w/diff    CBC w/Diff 8/5/21   WBC 4.71   RBC 4.39   Hemoglobin 13.7   Hematocrit 40.9   MCV 93.2   MCH 31.2   MCHC 33.5   RDW 12.1 (A)   Platelets 289   Neutrophil Rel % 53.7   Lymphocyte Rel % 31.0   Monocyte Rel % 10.8   Eosinophil Rel % 3.0   Basophil Rel % 1.3   (A) Abnormal value            Lipid Panel    Lipid Panel 8/5/21   Total Cholesterol 194   Triglycerides 81   HDL Cholesterol 99 (A)   VLDL Cholesterol 14   LDL Cholesterol  81   LDL/HDL Ratio 0.80   (A) Abnormal value       Comments are available for some flowsheets but are not being displayed.                             Assessment and Plan    Diagnoses and all orders for this visit:    1. Primary hypertension (Primary)  -     lisinopril (PRINIVIL,ZESTRIL) 5 MG tablet; Take 1 tablet by mouth Daily.  Dispense: 90 tablet; Refill: 1        Follow Up   Return in about 3 months (around 6/7/2022) for Recheck.  Patient was given instructions and counseling regarding her condition or for health maintenance advice. Please see specific information pulled into the AVS if appropriate.     Script sent in [employer] today.  Stop bystolic and start  lisinopril   rto in 3 mons     Mask and googles worn

## 2022-03-07 NOTE — TELEPHONE ENCOUNTER
Rx Refill Note  Requested Prescriptions     Pending Prescriptions Disp Refills   • zolpidem CR (AMBIEN CR) 12.5 MG CR tablet [Pharmacy Med Name: Zolpidem Tartrate ER Oral Tablet Extended Release 12.5 MG] 30 tablet 0     Sig: TAKE 1 TABLET BY MOUTH EVERY DAY AT NIGHT AS NEEDED FOR SLEEP      Last office visit with prescribing clinician: 12/21/2021      Next office visit with prescribing clinician: 3/7/2022            Jesica Ocampo MA  03/07/22, 09:20 EST

## 2022-03-22 DIAGNOSIS — M19.90 ARTHRITIS: ICD-10-CM

## 2022-03-23 RX ORDER — CYCLOBENZAPRINE HCL 5 MG
TABLET ORAL
Qty: 90 TABLET | Refills: 0 | Status: SHIPPED | OUTPATIENT
Start: 2022-03-23 | End: 2022-06-20

## 2022-04-19 DIAGNOSIS — F51.01 PRIMARY INSOMNIA: ICD-10-CM

## 2022-04-19 RX ORDER — ZOLPIDEM TARTRATE 12.5 MG/1
TABLET, FILM COATED, EXTENDED RELEASE ORAL
Qty: 90 TABLET | Refills: 0 | Status: SHIPPED | OUTPATIENT
Start: 2022-04-19 | End: 2022-11-16

## 2022-05-03 ENCOUNTER — TELEPHONE (OUTPATIENT)
Dept: FAMILY MEDICINE CLINIC | Facility: CLINIC | Age: 62
End: 2022-05-03

## 2022-05-26 NOTE — TELEPHONE ENCOUNTER
Rx Refill Note  Requested Prescriptions     Pending Prescriptions Disp Refills   • Praluent 75 MG/ML solution auto-injector [Pharmacy Med Name: Praluent Subcutaneous Solution Auto-injector 75 MG/ML] 6 mL 0     Sig: INJECT 75 MG INTO THE SKIN OF THE APPROPRIATE AREA AS DIRECTED EVERY 14 DAYS      Last office visit with prescribing clinician: 3/7/2022      Next office visit with prescribing clinician: Visit date not found            Yessy Canales MA/LMR  05/26/22, 17:15 EDT

## 2022-05-27 RX ORDER — ALIROCUMAB 75 MG/ML
INJECTION, SOLUTION SUBCUTANEOUS
Qty: 6 ML | Refills: 3 | Status: SHIPPED | OUTPATIENT
Start: 2022-05-27

## 2022-06-15 ENCOUNTER — OFFICE VISIT (OUTPATIENT)
Dept: CARDIOLOGY | Facility: CLINIC | Age: 62
End: 2022-06-15

## 2022-06-15 ENCOUNTER — OFFICE VISIT (OUTPATIENT)
Dept: FAMILY MEDICINE CLINIC | Facility: CLINIC | Age: 62
End: 2022-06-15

## 2022-06-15 VITALS
HEART RATE: 72 BPM | SYSTOLIC BLOOD PRESSURE: 110 MMHG | HEIGHT: 66 IN | DIASTOLIC BLOOD PRESSURE: 80 MMHG | WEIGHT: 127.6 LBS | BODY MASS INDEX: 20.51 KG/M2

## 2022-06-15 VITALS
DIASTOLIC BLOOD PRESSURE: 72 MMHG | HEIGHT: 65 IN | OXYGEN SATURATION: 98 % | TEMPERATURE: 97.5 F | WEIGHT: 127.8 LBS | BODY MASS INDEX: 21.29 KG/M2 | HEART RATE: 90 BPM | SYSTOLIC BLOOD PRESSURE: 122 MMHG

## 2022-06-15 DIAGNOSIS — F51.01 PRIMARY INSOMNIA: ICD-10-CM

## 2022-06-15 DIAGNOSIS — I10 PRIMARY HYPERTENSION: ICD-10-CM

## 2022-06-15 DIAGNOSIS — I10 PRIMARY HYPERTENSION: Primary | ICD-10-CM

## 2022-06-15 DIAGNOSIS — I25.10 CORONARY ARTERY DISEASE INVOLVING NATIVE CORONARY ARTERY OF NATIVE HEART WITHOUT ANGINA PECTORIS: ICD-10-CM

## 2022-06-15 DIAGNOSIS — Z78.9 STATIN INTOLERANCE: ICD-10-CM

## 2022-06-15 DIAGNOSIS — E78.5 HYPERLIPIDEMIA, UNSPECIFIED HYPERLIPIDEMIA TYPE: ICD-10-CM

## 2022-06-15 DIAGNOSIS — Z78.9 STATIN INTOLERANCE: Primary | ICD-10-CM

## 2022-06-15 DIAGNOSIS — M19.90 ARTHRITIS: ICD-10-CM

## 2022-06-15 PROBLEM — N39.0 URINARY TRACT INFECTION WITH HEMATURIA: Status: RESOLVED | Noted: 2017-06-09 | Resolved: 2022-06-15

## 2022-06-15 PROBLEM — R10.84 GENERALIZED ABDOMINAL PAIN: Status: RESOLVED | Noted: 2021-08-02 | Resolved: 2022-06-15

## 2022-06-15 PROBLEM — M50.30 DDD (DEGENERATIVE DISC DISEASE), CERVICAL: Status: ACTIVE | Noted: 2022-06-15

## 2022-06-15 PROBLEM — R31.9 URINARY TRACT INFECTION WITH HEMATURIA: Status: RESOLVED | Noted: 2017-06-09 | Resolved: 2022-06-15

## 2022-06-15 PROCEDURE — 99215 OFFICE O/P EST HI 40 MIN: CPT | Performed by: FAMILY MEDICINE

## 2022-06-15 PROCEDURE — 93000 ELECTROCARDIOGRAM COMPLETE: CPT | Performed by: INTERNAL MEDICINE

## 2022-06-15 PROCEDURE — 99214 OFFICE O/P EST MOD 30 MIN: CPT | Performed by: INTERNAL MEDICINE

## 2022-06-15 RX ORDER — METOPROLOL SUCCINATE 25 MG/1
25 TABLET, EXTENDED RELEASE ORAL DAILY
Qty: 30 TABLET | Refills: 2 | Status: SHIPPED | OUTPATIENT
Start: 2022-06-15 | End: 2022-07-18

## 2022-06-15 RX ORDER — ZOLPIDEM TARTRATE 12.5 MG/1
12.5 TABLET, FILM COATED, EXTENDED RELEASE ORAL NIGHTLY PRN
Qty: 30 TABLET | Refills: 2 | Status: CANCELLED | OUTPATIENT
Start: 2022-06-18

## 2022-06-15 RX ORDER — HYDROXYZINE HYDROCHLORIDE 25 MG/1
25 TABLET, FILM COATED ORAL 3 TIMES DAILY PRN
Qty: 30 TABLET | Refills: 2 | Status: SHIPPED | OUTPATIENT
Start: 2022-06-15 | End: 2022-09-19

## 2022-06-15 NOTE — PATIENT INSTRUCTIONS
May try to wean Ambien as discussed, start Vistaril 25 mg nightly as needed    Stop Bystolic, start Toprol-XL 25 mg daily    Further recommendations to follow based on labs, may need to add Zetia?    Continue current treatment plan.

## 2022-06-15 NOTE — PROGRESS NOTES
Chief Complaint  Hypertension (NEW PATIENT ESTABLISH CARE PREVIOUS SHABBIR MARTINEZ PATIENT), Insomnia, and Hyperlipidemia     New patient visit to me  Here to get established and 3-month follow-up on HTN and chronic insomnia  And  6-month follow-up for hyperlipidemia and OA    Previous PCP --- [facility] NP/Wilma Martinez, last visit here in March 2022 for chronic med refills and a blood pressure medication change.  At that visit, her Bystolic was changed to lisinopril 5 mg daily due to insurance reasons.  However, she has not yet made this medication change yet due to being provided samples of Bystolic up to this point.    Overall, continues to do very well.  Maintains a very healthy and active lifestyle.  Walks on a regular basis and for the most part maintains a healthy heart/low-cholesterol diet.  Weight stable.  Denies chest pain, SOA, lower extremity edema.    She does maintain regular follow-up with her cardiologist on a yearly basis, Dr. Martin.  She has plans to see him later on today.  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  She has been on Bystolic, ASA, and Praluent for many years.  Intolerant to statins.    No particular complaints today.  Uncertain about needed refills.  Overdue for 6-month fasting labs.  Compliant with and tolerates all medications without side effects, except for statins.  She takes Voltaren as needed for general OA.  Flexeril as well for history of chronic cervical DDD, status post cervical disc surgery x2 in past.  Also, she takes Ambien 1/2 tablet nightly times many years.  She really would like to get off Ambien if possible!?!     Review of Systems   Constitutional: Negative for fever and unexpected weight change.   Respiratory: Negative for cough and shortness of breath.    Cardiovascular: Negative for chest pain.        Subjective          Taty Garcia presents to [facility]    Objective   Vital Signs:   Vitals:    06/15/22 0955  "  BP: 122/72   BP Location: Left arm   Patient Position: Sitting   Cuff Size: Adult   Pulse: 90   Temp: 97.5 °F (36.4 °C)   SpO2: 98%   Weight: 58 kg (127 lb 12.8 oz)   Height: 165.1 cm (65\")      Physical Exam  Vitals and nursing note reviewed.   Constitutional:       Appearance: Normal appearance. She is well-developed and normal weight.   HENT:      Head: Normocephalic and atraumatic.      Nose: Nose normal.   Eyes:      Conjunctiva/sclera: Conjunctivae normal.      Pupils: Pupils are equal, round, and reactive to light.   Neck:      Thyroid: No thyromegaly.   Cardiovascular:      Rate and Rhythm: Normal rate and regular rhythm.      Heart sounds: Normal heart sounds. No murmur heard.  Pulmonary:      Effort: Pulmonary effort is normal.      Breath sounds: Normal breath sounds.   Abdominal:      General: Abdomen is flat. Bowel sounds are normal. There is no distension.      Palpations: Abdomen is soft. There is no hepatomegaly, splenomegaly or mass.      Tenderness: There is no abdominal tenderness. There is no guarding or rebound.      Hernia: No hernia is present.   Musculoskeletal:         General: Normal range of motion.      Cervical back: Normal range of motion and neck supple.      Right lower leg: No edema.      Left lower leg: No edema.   Lymphadenopathy:      Cervical: No cervical adenopathy.   Skin:     General: Skin is warm.   Neurological:      General: No focal deficit present.      Mental Status: She is alert.   Psychiatric:         Mood and Affect: Mood normal.         Behavior: Behavior normal.         Thought Content: Thought content normal.         Judgment: Judgment normal.        Result Review :     Common labs    Common Labsle 8/5/21 8/5/21 8/5/21 6/15/22 6/15/22    0815 0815 0815 1100 1100   Glucose  92  91    BUN  22 (A)  21    Creatinine  0.98  0.81    eGFR Non  Am  58 (A)      eGFR African Am  71      Sodium  142  141    Potassium  4.9  4.6    Chloride  105  99    Calcium  9.9  " 9.8    Total Protein  6.8  7.2    Albumin  4.50  4.8    Total Bilirubin  0.4  0.3    Alkaline Phosphatase  137 (A)  165 (A)    AST (SGOT)  17  22    ALT (SGPT)  15  19    WBC 4.71       Hemoglobin 13.7       Hematocrit 40.9       Platelets 289       Total Cholesterol   194  188   Triglycerides   81  129   HDL Cholesterol   99 (A)  88   LDL Cholesterol    81  78   (A) Abnormal value       Comments are available for some flowsheets but are not being displayed.                       Lab Results   Component Value Date    FWKA47SH 81.8 07/20/2015    FOLATE 19.9 07/20/2015               Assessment and Plan    Diagnoses and all orders for this visit:    1. Primary hypertension (Primary)  -well-controlled  Yet due to insurance reasons needs replacement medication for Bystolic.  Would suggest changing to Toprol-XL 25 mg daily.  Stop lisinopril.  Continue with healthy lifestyle    2. Coronary artery disease involving native coronary artery of native heart without angina pectoris  - S/P stent in distant past.  Continue with aggressive risk factor control.  Continue ASA 81 mg daily    3. Hyperlipidemia, unspecified hyperlipidemia type  -slightly uncontrolled  Due to recheck lipids and CMP  Ideally goal LDL needs to be less than 70 given history of CAD.  If at goal plan to continue Praluent 75 mg q. 2 weeks.  May need to consider adding Zetia?  Intolerant to statins  Needs low-carb/low calorie/low cholesterol diet and increase cardio exercise to greater than 150 minutes weekly  -     Comprehensive Metabolic Panel  -     Lipid Panel With LDL / HDL Ratio    4. Statin intolerance    5. Arthritis  -controlled  Recheck renal function, if it remains normal then she may continue Voltaren 50 mg twice daily as needed    6. Primary insomnia  -controlled  Jelani report reviewed, appropriate.  Very low risk patient behavior.  She really would like to wean off Ambien if possible.  Currently taking 1/2 tablet of a 12.5 mg dose.  May try  Atarax 25 mg 1 p.o. nightly as needed as she tries to decrease the frequency of taking her Ambien.  Have suggested every other night x1 week and every 2 nights x1 week then to discontinue.  If she fails Atarax then would like to consider starting low-dose trazodone?  She is seeing her cardiologist/Dr. Martin today and I have asked her to discuss possibly using trazodone as a treatment option.  Most likely she will have an EKG done today for her yearly cardiology checkup (I have provided her with a note to take to her appointment today.)    Other orders  -     metoprolol succinate XL (Toprol XL) 25 MG 24 hr tablet; Take 1 tablet by mouth Daily.  Dispense: 30 tablet; Refill: 2  -     hydrOXYzine (ATARAX) 25 MG tablet; Take 1 tablet by mouth 3 (Three) Times a Day As Needed for Anxiety.  Dispense: 30 tablet; Refill: 2      I spent 40 minutes caring for Taty on this date of service. This time includes time spent by me in the following activities:preparing for the visit, reviewing tests, obtaining and/or reviewing a separately obtained history, performing a medically appropriate examination and/or evaluation , counseling and educating the patient/family/caregiver, ordering medications, tests, or procedures, referring and communicating with other health care professionals , documenting information in the medical record, independently interpreting results and communicating that information with the patient/family/caregiver and Patient new to me, extensive history taking and decision making for multiple chronic problems.  Follow Up   Return in about 3 months (around 9/15/2022) for Annual physical, Recheck.  Patient was given instructions and counseling regarding her condition or for health maintenance advice. Please see specific information pulled into the AVS if appropriate.

## 2022-06-15 NOTE — PROGRESS NOTES
Date of Office Visit: 06/15/22  Encounter Provider: Dk [place] MD  Place of Service: [facility] CARDIOLOGY  Patient Name: Taty Garcia  :1960  [id number]    Chief Complaint   Patient presents with   • Coronary Artery Disease   :     HPI: Taty Garcia is a 61 y.o. female in  he had really classic angina and a stress test that was abnormal. She had a cath with a normal right and a normal circumflex but a 90 in her mid-LAD. We ended up doing an angioplasty and placed a 2.75 x28 Xience drug-eluting stent. There was a little bit of pinching of the origin of the diagonal but the flow was perfect. We did OCT this and it looked great and so we left that alone. She has had normal LV.    She is here for follow-up and she is doing well she has not had any symptoms no chest pain shortness of breath PND orthopnea edema her weight is good her activity level is good she has new PCP that wants to try her on Zetia which is fine with me.  And she is also going to try and wean her off of the Ambien which is great    Past Medical History:   Diagnosis Date   • Allergic     bronchitis, sinustitis   • Anxiety    • Arthritis    • Cervical strain 10/11/2018   • Cervicalgia    • Chest pain    • Coronary artery disease    • Depression    • Gastroesophageal reflux disease without esophagitis 2021   • History of coronary artery stent placement 2018   • Hyperlipidemia 3/29/2017   • Hypertension    • Insomnia    • SOB (shortness of breath)    • Tendonitis    • UTI (urinary tract infection)        Past Surgical History:   Procedure Laterality Date   • AUGMENTATION MAMMAPLASTY Bilateral     SILICONE   • BREAST BIOPSY Left     20 YERS AGO BENIGN   • BREAST SURGERY      cosmetic procedures   • CARDIAC CATHETERIZATION     • CARDIAC CATHETERIZATION     • COLONOSCOPY  approx    • COLONOSCOPY N/A 2021    Procedure: COLONOSCOPY to cecum with cold polypectomy;  Surgeon: Yuliana  Cleo FULLER MD;  Location: [facility] ENDOSCOPY;  Service: Gastroenterology;  Laterality: N/A;  PRE - screening  POST - polyp, hemorrhoids   • HYSTERECTOMY     • NECK SURGERY     • SPINE SURGERY      spinal fusion of cervical region   • SUBTOTAL HYSTERECTOMY         Social History     Socioeconomic History   • Marital status:    Tobacco Use   • Smoking status: Never Smoker   • Smokeless tobacco: Never Used   • Tobacco comment: CAFFEINE USE- 2 GLASSES TEA DAILY   Substance and Sexual Activity   • Alcohol use: Yes     Alcohol/week: 2.0 standard drinks     Types: 1 Glasses of wine, 1 Shots of liquor per week     Comment: occ   • Drug use: No   • Sexual activity: Defer       Family History   Problem Relation Age of Onset   • Cancer Mother         breast cancer   • Arthritis Mother    • Stroke Mother    • Pancreatitis Mother    • Breast cancer Mother 70   • Heart disease Father        Review of Systems   Constitutional: Negative for decreased appetite, fever, malaise/fatigue and weight loss.   HENT: Negative for nosebleeds.    Eyes: Negative for double vision.   Cardiovascular: Negative for chest pain, claudication, cyanosis, dyspnea on exertion, irregular heartbeat, leg swelling, near-syncope, orthopnea, palpitations, paroxysmal nocturnal dyspnea and syncope.   Respiratory: Negative for cough, hemoptysis and shortness of breath.    Hematologic/Lymphatic: Negative for bleeding problem.   Skin: Negative for rash.   Musculoskeletal: Negative for falls and myalgias.   Gastrointestinal: Negative for hematochezia, jaundice, melena, nausea and vomiting.   Genitourinary: Negative for hematuria.   Neurological: Negative for dizziness and seizures.   Psychiatric/Behavioral: Negative for altered mental status and memory loss.       Allergies   Allergen Reactions   • Lipitor [Atorvastatin] Myalgia     Other reaction(s): Myalgia   • Praluent [Alirocumab] Itching   • Pravachol [Pravastatin Sodium] Myalgia   • Pravastatin Myalgia  "    Other reaction(s): Myalgia   • Codeine Itching   • Oxycodone-Acetaminophen Itching         Current Outpatient Medications:   •  aspirin 81 MG EC tablet, Take 81 mg by mouth Daily., Disp: , Rfl:   •  Biotin 1 MG capsule, Take 1 tablet by mouth Daily., Disp: , Rfl:   •  Cholecalciferol (VITAMIN D-3) 1000 UNITS capsule, Take 1,000 Units by mouth Daily., Disp: , Rfl:   •  cyclobenzaprine (FLEXERIL) 5 MG tablet, TAKE 1 TABLET BY MOUTH THREE TIMES A DAY, Disp: 90 tablet, Rfl: 0  •  diclofenac (VOLTAREN) 50 MG EC tablet, TAKE 1 TABLET BY MOUTH TWO TIMES A DAY, Disp: 180 tablet, Rfl: 0  •  hydrOXYzine (ATARAX) 25 MG tablet, Take 1 tablet by mouth 3 (Three) Times a Day As Needed for Anxiety., Disp: 30 tablet, Rfl: 2  •  levocetirizine (XYZAL) 5 MG tablet, Take 5 mg by mouth Daily., Disp: , Rfl:   •  metoprolol succinate XL (Toprol XL) 25 MG 24 hr tablet, Take 1 tablet by mouth Daily., Disp: 30 tablet, Rfl: 2  •  Multiple Minerals-Vitamins (CALCIUM CITRATE +) tablet, Take 1 tablet by mouth Daily., Disp: , Rfl:   •  omeprazole (priLOSEC) 40 MG capsule, TAKE 1 CAPSULE BY MOUTH EVERY DAY for 4 weeks, Disp: , Rfl:   •  Praluent 75 MG/ML solution auto-injector, INJECT 75 MG INTO THE SKIN OF THE APPROPRIATE AREA AS DIRECTED EVERY 14 DAYS, Disp: 6 mL, Rfl: 3  •  zolpidem CR (AMBIEN CR) 12.5 MG CR tablet, TAKE 1 TABLET BY MOUTH EVERY DAY AT NIGHT AS NEEDED FOR SLEEP, Disp: 90 tablet, Rfl: 0      Objective:     Vitals:    06/15/22 1139   BP: 110/80   Pulse: 72   Weight: 57.9 kg (127 lb 9.6 oz)   Height: 167.6 cm (66\")     Body mass index is 20.6 kg/m².    Constitutional:       Appearance: Well-developed.   Eyes:      General: No scleral icterus.  HENT:      Head: Normocephalic.   Neck:      Thyroid: No thyromegaly.      Vascular: No JVD.      Lymphadenopathy: No cervical adenopathy.   Pulmonary:      Effort: Pulmonary effort is normal.      Breath sounds: Normal breath sounds. No wheezing. No rales.   Cardiovascular:      Normal " rate. Regular rhythm.      No gallop.   Edema:     Peripheral edema absent.   Abdominal:      Palpations: Abdomen is soft.      Tenderness: There is no abdominal tenderness.   Musculoskeletal: Normal range of motion. Skin:     General: Skin is warm and dry.      Findings: No rash.   Neurological:      Mental Status: Alert and oriented to person, place, and time.           ECG 12 Lead    Date/Time: 6/15/2022 12:07 PM  Performed by: Dk [place] MD  Authorized by: Dk [place] MD   Comparison: compared with previous ECG   Similar to previous ECG  Rhythm: sinus rhythm    Clinical impression: normal ECG             Assessment:       Diagnosis Plan   1. Statin intolerance     2. Coronary artery disease involving native coronary artery of native heart without angina pectoris     3. Primary hypertension            Plan:       Think she is doing very well she has a stent that we put in 8 years ago has not had any problems since then really did not have a lot in the way of risk factors before that has been intolerant of statins has been on a PSK 9 inhibitor now her PCP wants to add Zetia I am fine with that.  Blood pressure looks good activity level is good weight is good I would not make any changes I think should probably go to do really well in the long run    Return for 1 year Cyndy Rojas, Becky Knight, Mar Mckeon, 2 years with me.     As always, it has been a pleasure to participate in your patient's care.      Sincerely,       Dk [place] MD

## 2022-06-16 LAB
ALBUMIN SERPL-MCNC: 4.8 G/DL (ref 3.8–4.9)
ALBUMIN/GLOB SERPL: 2 {RATIO} (ref 1.2–2.2)
ALP SERPL-CCNC: 165 IU/L (ref 44–121)
ALT SERPL-CCNC: 19 IU/L (ref 0–32)
AST SERPL-CCNC: 22 IU/L (ref 0–40)
BILIRUB SERPL-MCNC: 0.3 MG/DL (ref 0–1.2)
BUN SERPL-MCNC: 21 MG/DL (ref 6–24)
BUN/CREAT SERPL: 26 (ref 9–23)
CALCIUM SERPL-MCNC: 9.8 MG/DL (ref 8.7–10.2)
CHLORIDE SERPL-SCNC: 99 MMOL/L (ref 96–106)
CHOLEST SERPL-MCNC: 188 MG/DL (ref 100–199)
CO2 SERPL-SCNC: 27 MMOL/L (ref 20–29)
CREAT SERPL-MCNC: 0.81 MG/DL (ref 0.57–1)
EGFRCR SERPLBLD CKD-EPI 2021: 84 ML/MIN/1.73
GLOBULIN SER CALC-MCNC: 2.4 G/DL (ref 1.5–4.5)
GLUCOSE SERPL-MCNC: 91 MG/DL (ref 65–99)
HDLC SERPL-MCNC: 88 MG/DL
LDLC SERPL CALC-MCNC: 78 MG/DL (ref 0–99)
LDLC/HDLC SERPL: 0.9 RATIO (ref 0–3.2)
POTASSIUM SERPL-SCNC: 4.6 MMOL/L (ref 3.5–5.2)
PROT SERPL-MCNC: 7.2 G/DL (ref 6–8.5)
SODIUM SERPL-SCNC: 141 MMOL/L (ref 134–144)
TRIGL SERPL-MCNC: 129 MG/DL (ref 0–149)
VLDLC SERPL CALC-MCNC: 22 MG/DL (ref 5–40)

## 2022-06-20 DIAGNOSIS — M19.90 ARTHRITIS: ICD-10-CM

## 2022-06-20 RX ORDER — CYCLOBENZAPRINE HCL 5 MG
TABLET ORAL
Qty: 90 TABLET | Refills: 0 | Status: SHIPPED | OUTPATIENT
Start: 2022-06-20 | End: 2022-09-19

## 2022-06-23 ENCOUNTER — PATIENT ROUNDING (BHMG ONLY) (OUTPATIENT)
Dept: FAMILY MEDICINE CLINIC | Facility: CLINIC | Age: 62
End: 2022-06-23

## 2022-06-23 NOTE — PROGRESS NOTES
June 23, 2022    Hello, may I speak with Taty Garcia?    My name is Enedina       [facility]  [address] [place] [id number] [phone].    Before we get started may I verify your date of birth? 1960    I am calling to officially welcome you to our practice and ask about your recent visit. Is this a good time to talk? No sent my chart message

## 2022-07-14 ENCOUNTER — TELEPHONE (OUTPATIENT)
Dept: FAMILY MEDICINE CLINIC | Facility: CLINIC | Age: 62
End: 2022-07-14

## 2022-07-14 NOTE — TELEPHONE ENCOUNTER
metoprolol succinate XL (Toprol XL) 25 MG 24 hr tablet (06/15/2022)        Was on Bystlic at last office.  Was switched to metoprolol.  However, pt can now get the generic bystilic (Nebivolol) $10 on a 90 day supply. Pt and pharmacy wondering if it is worth the switch to metoprolol.      Please call:    [facility]      [phone]

## 2022-07-18 RX ORDER — NEBIVOLOL 2.5 MG/1
2.5 TABLET ORAL DAILY
Qty: 90 TABLET | Refills: 1 | Status: SHIPPED | OUTPATIENT
Start: 2022-07-18 | End: 2023-01-31

## 2022-07-18 NOTE — TELEPHONE ENCOUNTER
Pharmacy calling to confirm really old Rx in chart for Bystolic was for 5 mgs 3 X week please confirm directions

## 2022-07-18 NOTE — TELEPHONE ENCOUNTER
Patient previously did the 5 mg tablet and split in half so will send in the 2.5 mgs daily of generic bystolic ok per Dr Vidal patient aware

## 2022-09-18 DIAGNOSIS — M19.90 ARTHRITIS: ICD-10-CM

## 2022-09-19 RX ORDER — HYDROXYZINE HYDROCHLORIDE 25 MG/1
TABLET, FILM COATED ORAL
Qty: 30 TABLET | Refills: 0 | Status: SHIPPED | OUTPATIENT
Start: 2022-09-19 | End: 2022-10-24

## 2022-09-19 RX ORDER — CYCLOBENZAPRINE HCL 5 MG
TABLET ORAL
Qty: 90 TABLET | Refills: 0 | Status: SHIPPED | OUTPATIENT
Start: 2022-09-19 | End: 2022-12-19

## 2022-09-25 DIAGNOSIS — M19.90 ARTHRITIS: ICD-10-CM

## 2022-09-26 NOTE — TELEPHONE ENCOUNTER
Rx Refill Note  Requested Prescriptions     Pending Prescriptions Disp Refills   • diclofenac (VOLTAREN) 50 MG EC tablet [Pharmacy Med Name: Diclofenac Sodium Oral Tablet Delayed Release 50 MG] 180 tablet 0     Sig: TAKE 1 TABLET BY MOUTH TWO TIMES A DAY      Last office visit with prescribing clinician: 6/15/2022      Next office visit with prescribing clinician: 11/16/2022            Pawel Dunbar MA  09/26/22, 08:10 EDT

## 2022-10-24 RX ORDER — HYDROXYZINE HYDROCHLORIDE 25 MG/1
TABLET, FILM COATED ORAL
Qty: 30 TABLET | Refills: 0 | Status: SHIPPED | OUTPATIENT
Start: 2022-10-24 | End: 2022-11-21

## 2022-11-16 ENCOUNTER — OFFICE VISIT (OUTPATIENT)
Dept: FAMILY MEDICINE CLINIC | Facility: CLINIC | Age: 62
End: 2022-11-16

## 2022-11-16 VITALS
DIASTOLIC BLOOD PRESSURE: 60 MMHG | HEART RATE: 74 BPM | OXYGEN SATURATION: 99 % | HEIGHT: 66 IN | WEIGHT: 135.6 LBS | BODY MASS INDEX: 21.79 KG/M2 | TEMPERATURE: 97.5 F | SYSTOLIC BLOOD PRESSURE: 102 MMHG

## 2022-11-16 DIAGNOSIS — Z78.0 POSTMENOPAUSE: ICD-10-CM

## 2022-11-16 DIAGNOSIS — R63.5 WEIGHT GAIN: ICD-10-CM

## 2022-11-16 DIAGNOSIS — S39.012A STRAIN OF LUMBAR REGION, INITIAL ENCOUNTER: ICD-10-CM

## 2022-11-16 DIAGNOSIS — Z91.09 ENVIRONMENTAL ALLERGIES: ICD-10-CM

## 2022-11-16 DIAGNOSIS — F51.01 PRIMARY INSOMNIA: ICD-10-CM

## 2022-11-16 DIAGNOSIS — I25.10 CORONARY ARTERY DISEASE INVOLVING NATIVE CORONARY ARTERY OF NATIVE HEART WITHOUT ANGINA PECTORIS: ICD-10-CM

## 2022-11-16 DIAGNOSIS — Z12.31 ENCOUNTER FOR SCREENING MAMMOGRAM FOR BREAST CANCER: ICD-10-CM

## 2022-11-16 DIAGNOSIS — I10 PRIMARY HYPERTENSION: ICD-10-CM

## 2022-11-16 DIAGNOSIS — Z00.00 WELLNESS EXAMINATION: Primary | ICD-10-CM

## 2022-11-16 DIAGNOSIS — E78.5 HYPERLIPIDEMIA, UNSPECIFIED HYPERLIPIDEMIA TYPE: ICD-10-CM

## 2022-11-16 PROBLEM — Z13.0 SCREENING FOR IRON DEFICIENCY ANEMIA: Status: RESOLVED | Noted: 2018-06-15 | Resolved: 2022-11-16

## 2022-11-16 PROBLEM — Z79.899 HIGH RISK MEDICATION USE: Status: RESOLVED | Noted: 2020-12-14 | Resolved: 2022-11-16

## 2022-11-16 PROCEDURE — 99214 OFFICE O/P EST MOD 30 MIN: CPT | Performed by: FAMILY MEDICINE

## 2022-11-16 PROCEDURE — 99396 PREV VISIT EST AGE 40-64: CPT | Performed by: FAMILY MEDICINE

## 2022-11-16 RX ORDER — AZELASTINE 1 MG/ML
2 SPRAY, METERED NASAL 2 TIMES DAILY
Qty: 1 EACH | Refills: 3 | Status: SHIPPED | OUTPATIENT
Start: 2022-11-16

## 2022-11-16 NOTE — PATIENT INSTRUCTIONS
Get lab work as planned    May start Astelin nasal spray, stop Xyzal    May increase diclofenac to 75 mg twice daily x7 days    Further recommendations to follow based on labs, may need to start Zetia?    Get shingles vaccine  Recommend low fat/low calorie diet and exercise greater than 150 minutes of cardio per week.    Continue current treatment plan.

## 2022-11-16 NOTE — PROGRESS NOTES
Chief Complaint  Annual Exam (YEARLY WELLNESS S/P HYSTERECTOMY MAMMOGRAM January 2022 PATIENT IS NOT FASTING LAST COLONOSCOPY DONE 12/2021 IN CHART), Hypertension, Insomnia, and Hyperlipidemia     PRESENTS FOR ANNUAL WELLNESS EXAM  And  4-month follow-up on hyperlipidemia, insomnia, HTN, allergies, and complaints of low back pain x2 days    LOV with me in June to get established and for chronic med refills with labs.  -- Long discussion regarding her reliance on Ambien.  She was in agreement to try to wean off Ambien.    Therefore recommendations made to wean Ambien and start Vistaril 25 mg nightly.  She has been able to successfully wean off Ambien and now taking Vistaril 25 mg nightly which is working just fine!  -- Bystolic was changed to Toprol-XL due to insurance reasons.  However, apparently she was able to get coverage for her Bystolic after all.  Back on Bystolic now.    Overall, continues to do very well.  Still working full-time, teacher.  Maintains yearly follow-up with her cardiologist/Dr. [place] who she just saw this past summer, no changes made.  Maintains a healthy low-cholesterol diet and regular exercise, although still doing the same thing she has always done (walking 3 to 4 days a week approximately 30 minutes.)  Denies chest pain, SOA, edema.    She does have a few complaints today.  -- Complains of increased weight gain, really frustrated with her weight given eats healthy and exercises on a regular basis.    --Complains of seasonal allergies that are often associated with vertigo.  Had an episode of vertigo a few weeks ago that lasted for 1 week.    This was associated with some nausea.  She seems to get this on a yearly basis every fall--vertigo, room spinning, and nausea.  Only last x1 week.  Recently started allergy injections.  Still on Xyzal on a daily basis and now on hydroxyzine 25 mg nightly for insomnia    --Also, complains of some low back pain x2 days.  No known injury.  Intermittent  "radicular pain into the front of her right leg.  No weakness, no urinary retention or fecal incontinence.  Known history of arthritis, currently on Voltaren 50 mg twice daily and cervical DDD.    Otherwise, needs chronic med refills.  Due for fasting labs.  Compliant with and tolerates all medications without side effects.    Routine health maintenance/screening test:  PAP --- S/P BAUTISTA, benign reasons  MAMMO --- January 2022, negative  DEXA --- greater than 5 years ago  Colorectal Screen --- C-scope done in December 2021  Vaccines --- due for shingles vaccine   Smoking/ETOH Status --- non-smoker.  Social alcohol only  Dentist, Eye Exam, Derm --- maintains regular dental visits, ophthalmology exam and has a dermatologist  Diet/Exercise --- maintains a healthy low-cholesterol diet and regular cardio exercise, approximately 150 minutes weekly  Pertinent FH --- CAD/father, CVA/mother, negative for colon cancer, breast cancer/mother    Review of Systems   Constitutional: Negative for fever and unexpected weight change.   Respiratory: Negative for cough and shortness of breath.    Cardiovascular: Negative for chest pain.        Subjective          Taty Garcia presents to [facility]    Objective   Vital Signs:   Vitals:    11/16/22 1515   BP: 102/60   BP Location: Left arm   Patient Position: Sitting   Cuff Size: Adult   Pulse: 74   Temp: 97.5 °F (36.4 °C)   SpO2: 99%   Weight: 61.5 kg (135 lb 9.6 oz)   Height: 167.6 cm (66\")      Body mass index is 21.89 kg/m².   Physical Exam  Vitals and nursing note reviewed.   Constitutional:       Appearance: Normal appearance. She is well-developed.   HENT:      Head: Normocephalic and atraumatic.      Nose: Nose normal.   Eyes:      Conjunctiva/sclera: Conjunctivae normal.      Pupils: Pupils are equal, round, and reactive to light.   Neck:      Thyroid: No thyromegaly.   Cardiovascular:      Rate and Rhythm: Normal rate and regular rhythm.      " Heart sounds: Normal heart sounds. No murmur heard.  Pulmonary:      Effort: Pulmonary effort is normal.      Breath sounds: Normal breath sounds.   Abdominal:      General: Abdomen is flat. Bowel sounds are normal. There is no distension.      Palpations: Abdomen is soft. There is no hepatomegaly, splenomegaly or mass.      Tenderness: There is no abdominal tenderness. There is no guarding or rebound.      Hernia: No hernia is present.   Musculoskeletal:         General: Normal range of motion.      Cervical back: Normal range of motion and neck supple.      Right lower leg: No edema.      Left lower leg: No edema.      Comments: Slight paraspinal tenderness bilaterally  Negative straight leg raise bilaterally   Normal strength and reflexes bilateral lower extremities     Lymphadenopathy:      Cervical: No cervical adenopathy.   Skin:     General: Skin is warm.   Neurological:      General: No focal deficit present.      Mental Status: She is alert.   Psychiatric:         Mood and Affect: Mood normal.         Behavior: Behavior normal.         Thought Content: Thought content normal.         Judgment: Judgment normal.        Result Review :     Common labs    Common Labs 6/15/22 6/15/22    1100 1100   Glucose 91    BUN 21    Creatinine 0.81    Sodium 141    Potassium 4.6    Chloride 99    Calcium 9.8    Total Protein 7.2    Albumin 4.8    Total Bilirubin 0.3    Alkaline Phosphatase 165 (A)    AST (SGOT) 22    ALT (SGPT) 19    Total Cholesterol  188   Triglycerides  129   HDL Cholesterol  88   LDL Cholesterol   78   (A) Abnormal value                        Lab Results   Component Value Date    TFKR22PD 81.8 07/20/2015    FOLATE 19.9 07/20/2015               Assessment and Plan    Diagnoses and all orders for this visit:    1. Wellness examination (Primary)  -within normal limits  All screening up-to-date except for needs: Mammogram, DEXA, labs listed below, and shingles vaccine (plans obtain from pharmacy in near  future.)  See orders below.  Otherwise, continue to improve upon healthy lifestyle --Needs low-carb/low calorie/low cholesterol diet and increase cardio exercise to greater than 150 minutes weekly  -     CBC & Differential; Future  -     Hepatitis C Antibody; Future    2. Encounter for screening mammogram for breast cancer  -     Mammo Screening Bilateral With CAD; Future    3. Postmenopause  -     DEXA Bone Density Axial; Future    4. Coronary artery disease involving native coronary artery of native heart without angina pectoris  -stable  Continue with aggressive risk factor control, see treatment plan below  Continue ASA 81 mg daily.  Further recommendations per cardiologist/Dr. [place]    5. Hyperlipidemia, unspecified hyperlipidemia type  -uncontrolled  Slightly uncontrolled given history of CAD, LDL needs to be less than 70.  Intolerant to statins.  Recommend rechecking lipids, CMP, TSH.  If LDL remains greater than 70 then needs TO ADD Zetia 10 mg daily.  Plan to continue Praluent as prescribed, will refill upon request  Needs low-carb/low calorie/low cholesterol diet and increase cardio exercise to greater than 150 minutes weekly  -     Comprehensive Metabolic Panel; Future  -     Lipid Panel With LDL / HDL Ratio; Future  -     TSH; Future    6. Primary hypertension  -controlled, no change with Bystolic    7. Primary insomnia  -controlled off Ambien!!  She has successfully weaned off Ambien, now taking hydroxyzine 25 mg nightly    8. Environmental allergies  -fair control  Needs to stop Xyzal given on hydroxyzine.  May start Astelin nasal spray as directed below.  S/P recent episode of vertigo, seasonal, see above HPI.  Educated.  Follow-up with allergist for immunotherapy as planned    9. Strain of lumbar region, initial encounter  -acute Dx.   May increase Voltaren to 75 mg twice daily x7 to 10 days then back down to 50 mg twice daily for general OA  If not better follow-up for imaging    10. Weight gain   -new problem.  Check TSH.  Suspect this is due to menopausal years.  Really needs to increase her cardio exercise to greater than 200 minutes weekly and more intensity.    Other orders  -     azelastine (ASTELIN) 0.1 % nasal spray; 2 sprays into the nostril(s) as directed by provider 2 (Two) Times a Day. Use in each nostril as directed  Dispense: 1 each; Refill: 3    In addition to wellness exam--- separate and identifiable new and uncontrolled problems addressed as well today, uncontrolled hyperlipidemia, weight gain, and acute lumbar strain    If all labs WNL and no changes are made or medications added then may follow-up in 6 months.  Otherwise needs a 3-month follow-up      Follow Up   Return in about 6 months (around 5/16/2023) for Recheck, needs appointment for lab work.  Patient was given instructions and counseling regarding her condition or for health maintenance advice. Please see specific information pulled into the AVS if appropriate.

## 2022-11-21 ENCOUNTER — TELEPHONE (OUTPATIENT)
Dept: FAMILY MEDICINE CLINIC | Facility: CLINIC | Age: 62
End: 2022-11-21

## 2022-11-21 NOTE — TELEPHONE ENCOUNTER
Caller: Taty Garcia    Relationship: Self    Best call back number: [phone]    What is the best time to reach you: ANY     Who are you requesting to speak with (clinical staff, provider,  specific staff member): CLINICAL STAFF       What was the call regarding: WAS CALLED BY [facility] FOR MAMMOGRAM AND BONE DENSITY, WOULD LIKE TO KNOW IF ORDERS CAN BE REDONE AND GO TO [facility].     Do you require a callback: YES

## 2022-11-21 NOTE — TELEPHONE ENCOUNTER
Rx Refill Note  Requested Prescriptions     Pending Prescriptions Disp Refills   • hydrOXYzine (ATARAX) 25 MG tablet [Pharmacy Med Name: hydrOXYzine HCl Oral Tablet 25 MG] 90 tablet 0     Sig: TAKE 1 TABLET BY MOUTH THREE TIMES A DAY AS NEEDED FOR ANXIETY      Last office visit with prescribing clinician: 11/16/2022      Next office visit with prescribing clinician: Visit date not found            Yessy Canales MA/LMR  11/21/22, 08:30 EST

## 2022-11-22 NOTE — TELEPHONE ENCOUNTER
PATIENT CALLED TO CHECK ON THIS REQUEST, SHE HAS A 4 DAY SUPPLY LEFT.    PLEASE ADVISE    CALLBACK: [phone]

## 2022-11-23 RX ORDER — HYDROXYZINE HYDROCHLORIDE 25 MG/1
TABLET, FILM COATED ORAL
Qty: 90 TABLET | Refills: 0 | Status: SHIPPED | OUTPATIENT
Start: 2022-11-23 | End: 2023-03-02

## 2022-12-07 RX ORDER — LEVOTHYROXINE SODIUM 0.03 MG/1
25 TABLET ORAL DAILY
Qty: 90 TABLET | Refills: 1 | Status: SHIPPED | OUTPATIENT
Start: 2022-12-07

## 2022-12-19 DIAGNOSIS — M19.90 ARTHRITIS: ICD-10-CM

## 2022-12-19 RX ORDER — CYCLOBENZAPRINE HCL 5 MG
TABLET ORAL
Qty: 90 TABLET | Refills: 0 | Status: SHIPPED | OUTPATIENT
Start: 2022-12-19 | End: 2023-03-17

## 2022-12-29 DIAGNOSIS — M19.90 ARTHRITIS: ICD-10-CM

## 2023-01-31 ENCOUNTER — TELEPHONE (OUTPATIENT)
Dept: FAMILY MEDICINE CLINIC | Facility: CLINIC | Age: 63
End: 2023-01-31
Payer: COMMERCIAL

## 2023-01-31 DIAGNOSIS — Z12.31 ENCOUNTER FOR SCREENING MAMMOGRAM FOR BREAST CANCER: ICD-10-CM

## 2023-01-31 DIAGNOSIS — Z78.0 POSTMENOPAUSE: ICD-10-CM

## 2023-01-31 RX ORDER — NEBIVOLOL 2.5 MG/1
TABLET ORAL
Qty: 90 TABLET | Refills: 0 | Status: SHIPPED | OUTPATIENT
Start: 2023-01-31 | End: 2023-01-31 | Stop reason: SDUPTHER

## 2023-01-31 RX ORDER — NEBIVOLOL 2.5 MG/1
2.5 TABLET ORAL DAILY
Qty: 90 TABLET | Refills: 1 | Status: ON HOLD | OUTPATIENT
Start: 2023-01-31 | End: 2023-02-23 | Stop reason: SDUPTHER

## 2023-01-31 NOTE — TELEPHONE ENCOUNTER
Caller: Taty Garcia    Relationship: Self    Best call back number: [phone]    What test was performed: MAMMOGRAM AND DEXA SCAN    RESULTS ARE IN MYCHART. PATIENT WOULD LIKE A CALL TO GO OVER.

## 2023-02-20 ENCOUNTER — LAB (OUTPATIENT)
Dept: LAB | Facility: HOSPITAL | Age: 63
End: 2023-02-20
Payer: COMMERCIAL

## 2023-02-20 ENCOUNTER — HOSPITAL ENCOUNTER (OUTPATIENT)
Dept: CARDIOLOGY | Facility: HOSPITAL | Age: 63
Discharge: HOME | End: 2023-02-20
Payer: COMMERCIAL

## 2023-02-20 ENCOUNTER — TRANSCRIBE ORDERS (OUTPATIENT)
Dept: CARDIOLOGY | Facility: CLINIC | Age: 63
End: 2023-02-20
Payer: COMMERCIAL

## 2023-02-20 ENCOUNTER — TELEPHONE (OUTPATIENT)
Dept: CARDIOLOGY | Facility: CLINIC | Age: 63
End: 2023-02-20
Payer: COMMERCIAL

## 2023-02-20 ENCOUNTER — OFFICE VISIT (OUTPATIENT)
Dept: CARDIOLOGY | Facility: CLINIC | Age: 63
End: 2023-02-20
Payer: COMMERCIAL

## 2023-02-20 VITALS
DIASTOLIC BLOOD PRESSURE: 70 MMHG | SYSTOLIC BLOOD PRESSURE: 118 MMHG | HEIGHT: 66 IN | BODY MASS INDEX: 21.89 KG/M2 | HEART RATE: 70 BPM

## 2023-02-20 DIAGNOSIS — I20.8 STABLE ANGINA PECTORIS: Primary | ICD-10-CM

## 2023-02-20 DIAGNOSIS — I20.9 ANGINA PECTORIS: Primary | ICD-10-CM

## 2023-02-20 DIAGNOSIS — I25.10 CORONARY ARTERY DISEASE INVOLVING NATIVE CORONARY ARTERY OF NATIVE HEART WITHOUT ANGINA PECTORIS: ICD-10-CM

## 2023-02-20 DIAGNOSIS — Z01.810 PRE-OPERATIVE CARDIOVASCULAR EXAMINATION: ICD-10-CM

## 2023-02-20 DIAGNOSIS — I20.8 STABLE ANGINA PECTORIS: ICD-10-CM

## 2023-02-20 DIAGNOSIS — I10 PRIMARY HYPERTENSION: ICD-10-CM

## 2023-02-20 DIAGNOSIS — Z01.810 PRE-OPERATIVE CARDIOVASCULAR EXAMINATION: Primary | ICD-10-CM

## 2023-02-20 DIAGNOSIS — Z13.6 SCREENING FOR ISCHEMIC HEART DISEASE: ICD-10-CM

## 2023-02-20 DIAGNOSIS — E78.5 HYPERLIPIDEMIA, UNSPECIFIED HYPERLIPIDEMIA TYPE: ICD-10-CM

## 2023-02-20 DIAGNOSIS — Z78.9 STATIN INTOLERANCE: ICD-10-CM

## 2023-02-20 DIAGNOSIS — I25.118 CORONARY ARTERY DISEASE OF NATIVE ARTERY OF NATIVE HEART WITH STABLE ANGINA PECTORIS: ICD-10-CM

## 2023-02-20 LAB
ANION GAP SERPL CALCULATED.3IONS-SCNC: 9 MMOL/L (ref 5–15)
BASOPHILS # BLD AUTO: 0.05 10*3/MM3 (ref 0–0.2)
BASOPHILS NFR BLD AUTO: 1 % (ref 0–1.5)
BUN SERPL-MCNC: 19 MG/DL (ref 8–23)
BUN/CREAT SERPL: 21.1 (ref 7–25)
CALCIUM SPEC-SCNC: 9.8 MG/DL (ref 8.6–10.5)
CHLORIDE SERPL-SCNC: 104 MMOL/L (ref 98–107)
CO2 SERPL-SCNC: 31 MMOL/L (ref 22–29)
CREAT SERPL-MCNC: 0.9 MG/DL (ref 0.57–1)
DEPRECATED RDW RBC AUTO: 40.6 FL (ref 37–54)
EGFRCR SERPLBLD CKD-EPI 2021: 73.3 ML/MIN/1.73
EOSINOPHIL # BLD AUTO: 0.11 10*3/MM3 (ref 0–0.4)
EOSINOPHIL NFR BLD AUTO: 2.2 % (ref 0.3–6.2)
ERYTHROCYTE [DISTWIDTH] IN BLOOD BY AUTOMATED COUNT: 12.5 % (ref 12.3–15.4)
GLUCOSE SERPL-MCNC: 127 MG/DL (ref 65–99)
HCT VFR BLD AUTO: 38.6 % (ref 34–46.6)
HGB BLD-MCNC: 13.1 G/DL (ref 12–15.9)
IMM GRANULOCYTES # BLD AUTO: 0.01 10*3/MM3 (ref 0–0.05)
IMM GRANULOCYTES NFR BLD AUTO: 0.2 % (ref 0–0.5)
LYMPHOCYTES # BLD AUTO: 1.82 10*3/MM3 (ref 0.7–3.1)
LYMPHOCYTES NFR BLD AUTO: 35.6 % (ref 19.6–45.3)
MCH RBC QN AUTO: 30 PG (ref 26.6–33)
MCHC RBC AUTO-ENTMCNC: 33.9 G/DL (ref 31.5–35.7)
MCV RBC AUTO: 88.3 FL (ref 79–97)
MONOCYTES # BLD AUTO: 0.42 10*3/MM3 (ref 0.1–0.9)
MONOCYTES NFR BLD AUTO: 8.2 % (ref 5–12)
NEUTROPHILS NFR BLD AUTO: 2.7 10*3/MM3 (ref 1.7–7)
NEUTROPHILS NFR BLD AUTO: 52.8 % (ref 42.7–76)
NRBC BLD AUTO-RTO: 0 /100 WBC (ref 0–0.2)
PLATELET # BLD AUTO: 316 10*3/MM3 (ref 140–450)
PMV BLD AUTO: 9.9 FL (ref 6–12)
POTASSIUM SERPL-SCNC: 4.3 MMOL/L (ref 3.5–5.2)
RBC # BLD AUTO: 4.37 10*6/MM3 (ref 3.77–5.28)
SODIUM SERPL-SCNC: 144 MMOL/L (ref 136–145)
WBC NRBC COR # BLD: 5.11 10*3/MM3 (ref 3.4–10.8)

## 2023-02-20 PROCEDURE — 93000 ELECTROCARDIOGRAM COMPLETE: CPT | Performed by: NURSE PRACTITIONER

## 2023-02-20 PROCEDURE — 93018 CV STRESS TEST I&R ONLY: CPT | Performed by: INTERNAL MEDICINE

## 2023-02-20 PROCEDURE — 80048 BASIC METABOLIC PNL TOTAL CA: CPT

## 2023-02-20 PROCEDURE — 85025 COMPLETE CBC W/AUTO DIFF WBC: CPT

## 2023-02-20 PROCEDURE — 93017 CV STRESS TEST TRACING ONLY: CPT

## 2023-02-20 PROCEDURE — 36415 COLL VENOUS BLD VENIPUNCTURE: CPT

## 2023-02-20 PROCEDURE — 99214 OFFICE O/P EST MOD 30 MIN: CPT | Performed by: NURSE PRACTITIONER

## 2023-02-20 PROCEDURE — 93016 CV STRESS TEST SUPVJ ONLY: CPT | Performed by: INTERNAL MEDICINE

## 2023-02-20 NOTE — TELEPHONE ENCOUNTER
"Taty Garcia called to intermittent chest pain.    Patient reports she has been experiencing chest pain with exertion only when she is exerting herself outside.  Episodes of chest pain started about 1 month ago.  Patient reports pain occurs \"high\" up in the middle of her chest, rated 5 out of 10.  Patient described pain as both sharp and pressure.  Patient reports associated symptom of shortness of breath, but denies any nausea/vomiting, sweating, or dizziness.  Patient denies pain radiates.  Pain resolves after a few minutes of resting.  Patient denies any chest pain right now.    Cardiac Meds  Nebivolol 2.5 mg, daily  Aspirin 81 mg, daily  Praluent     Patient stated she was pushing her grandson in a stroller on a flat surface yesterday and developed chest pain near the end of her.  Patient also reports developing chest pain when walking up hill.    LOV: 6/15/22 with Dr. Mario Kemp OV: 6/19/23 with Cyndy    Patient is currently at work and stated she can come in for an appointment today after 1:30pm if necessary.    Please let me know how you would like to proceed.    Thank you,  Angie PHILLIPS RN  Triage Nurse [facility]  "

## 2023-02-20 NOTE — H&P (VIEW-ONLY)
"      [facility] CARDIOLOGY  [address] [facility] [address]  [facility] [address]  Phone: [phone]  Patient Name: Taty Garcia  :1960  Age: 62 y.o.  Primary Cardiologist: Dk Martin MD  Encounter Provider:  YEHUDA Mcghee    History of Present Illness     Taty Garcia is a 62 y.o.  female whose medical history includes hypertension, hyperlipidemia, and GERD.  She is followed in our office by Dr. Martin for coronary artery disease. I have reviewed the past medical records in preparation of today's visit.     23 Follow-up:  She is here for follow-up of chest pain and I am seeing her for the first time today.  She exercises daily.  She does not exercise when the weather is cold.  She has noticed that when exercising outside when the weather is nice she has chest pain when walking up hills.  She does not have chest pain when thank you she exercises at home but this is usually on a flat surface.  This chest tightness feels different than her previous angina.  She denies dyspnea with exertion, palpitations, lightheadedness, or leg swelling.  She is taking her medications as prescribed.  Her blood pressure at home is similar to today's reading.    Data Review     The following data was reviewed by YEHUDA Mcghee on 23:    Vital Signs:   /70   Pulse 70   Ht 167.6 cm (66\")   BMI 21.89 kg/m²       Weight:  Wt Readings from Last 3 Encounters:   22 61.5 kg (135 lb 9.6 oz)   06/15/22 57.9 kg (127 lb 9.6 oz)   06/15/22 58 kg (127 lb 12.8 oz)     Body mass index is 21.89 kg/m².    Below is a summary of pertinent cardiology findings:  • May 2015 she was evaluated for classic angina; stress test was abnormal.  Cardiac catheterization showed a 90% stenosis in the mid LAD which was treated with angioplasty and a 2.75 x 28 mm Xience drug-eluting stent; the was some pinching of the origin of the diagonal but with good blood flow.  " The other coronary arteries were normal.  Her LV was normal.  • May 2017 treadmill stress test showed no EKG changes and normal blood pressure and response to exercise.  She exercised for 10-1/2 minutes.  She did complain of atypical chest pain that got worse with exercise; this was considered a low risk study.  • December 2020 echocardiogram showed EF 61%, normal LV size and wall thickness, grade 1 LV diastolic dysfunction, mild calcification of the aortic valve without regurgitation or stenosis, trace mitral valve regurgitation, mild tricuspid valve regurgitation, and normal RVSP.    Labs:  • 11/23/2022:  cr 0.9, K 4.8, , otherwise unremarkable CMP, TSH 3.480, Chol 181, , LDL 64, Trig 71, Hgb 13.7, Plt 384      ECG 12 Lead    Date/Time: 2/20/2023 3:05 PM  Performed by: Jo-Ann Mccarty APRN  Authorized by: Jo-Ann Mccarty APRN   Comparison: compared with previous ECG from 6/15/2022  Similar to previous ECG  Rhythm: sinus rhythm  Rate: normal  BPM: 70  T flattening: aVL and V2  Other findings: T wave abnormality    Clinical impression: non-specific ECG            Medications     Allergies as of 02/20/2023 - Reviewed 02/20/2023   Allergen Reaction Noted   • Lipitor [atorvastatin] Myalgia 08/22/2016   • Praluent [alirocumab] Itching 08/19/2020   • Pravachol [pravastatin sodium] Myalgia 08/22/2016   • Pravastatin Myalgia 08/22/2016   • Codeine Itching 05/13/2016   • Oxycodone-acetaminophen Itching 12/15/2016         Current Outpatient Medications:   •  aspirin 81 MG EC tablet, Take 81 mg by mouth Daily., Disp: , Rfl:   •  azelastine (ASTELIN) 0.1 % nasal spray, 2 sprays into the nostril(s) as directed by provider 2 (Two) Times a Day. Use in each nostril as directed, Disp: 1 each, Rfl: 3  •  Cholecalciferol (VITAMIN D-3) 1000 UNITS capsule, Take 1,000 Units by mouth Daily., Disp: , Rfl:   •  cyclobenzaprine (FLEXERIL) 5 MG tablet, TAKE 1 TABLET BY MOUTH THREE TIMES A DAY, Disp: 90  tablet, Rfl: 0  •  diclofenac (VOLTAREN) 50 MG EC tablet, TAKE 1 TABLET BY MOUTH TWO TIMES A DAY, Disp: 180 tablet, Rfl: 0  •  hydrOXYzine (ATARAX) 25 MG tablet, TAKE 1 TABLET BY MOUTH THREE TIMES A DAY AS NEEDED FOR ANXIETY, Disp: 90 tablet, Rfl: 0  •  levothyroxine (SYNTHROID, LEVOTHROID) 25 MCG tablet, Take 1 tablet by mouth Daily., Disp: 90 tablet, Rfl: 1  •  Multiple Minerals-Vitamins (CALCIUM CITRATE +) tablet, Take 1 tablet by mouth Daily., Disp: , Rfl:   •  nebivolol (BYSTOLIC) 2.5 MG tablet, Take 1 tablet by mouth Daily., Disp: 90 tablet, Rfl: 1  •  omeprazole (priLOSEC) 40 MG capsule, TAKE 1 CAPSULE BY MOUTH EVERY DAY for 4 weeks, Disp: , Rfl:   •  Praluent 75 MG/ML solution auto-injector, INJECT 75 MG INTO THE SKIN OF THE APPROPRIATE AREA AS DIRECTED EVERY 14 DAYS, Disp: 6 mL, Rfl: 3     Past History, Review of Systems, Exam     Past Medical History:   Diagnosis Date   • Allergic    • Anxiety    • Arthritis    • Cervical strain 10/11/2018   • Cervicalgia    • Chest pain    • Coronary artery disease    • Depression    • Gastroesophageal reflux disease without esophagitis 8/2/2021   • History of coronary artery stent placement 6/28/2018   • Hyperlipidemia 3/29/2017   • Hypertension    • Insomnia    • SOB (shortness of breath)    • Tendonitis    • UTI (urinary tract infection)        Past Surgical History:   has a past surgical history that includes Neck surgery; Subtotal Hysterectomy; Cardiac catheterization; Hysterectomy; Spine surgery; Breast surgery; Cardiac catheterization; Colonoscopy (approx 2013); Colonoscopy (N/A, 12/20/2021); Breast biopsy (Left); and Augmentation mammaplasty (Bilateral, 2008).     Social History     Socioeconomic History   • Marital status:    Tobacco Use   • Smoking status: Never   • Smokeless tobacco: Never   • Tobacco comments:     CAFFEINE USE- 2 GLASSES TEA DAILY   Substance and Sexual Activity   • Alcohol use: Yes     Alcohol/week: 2.0 standard drinks     Types: 1  Glasses of wine, 1 Shots of liquor per week     Comment: occ   • Drug use: No   • Sexual activity: Defer       Review of Systems   Cardiovascular: Positive for chest pain. Negative for claudication, cyanosis, dyspnea on exertion, irregular heartbeat, leg swelling, near-syncope, orthopnea, palpitations, paroxysmal nocturnal dyspnea and syncope.       Vitals reviewed.   Constitutional:       Appearance: Not in distress.   Eyes:      Conjunctiva/sclera: Conjunctivae normal.      Pupils: Pupils are equal, round, and reactive to light.   HENT:      Head: Normocephalic.      Nose: Nose normal.    Mouth/Throat:      Pharynx: Oropharynx is clear.   Neck:      Vascular: JVD normal.   Pulmonary:      Effort: Pulmonary effort is normal.      Breath sounds: Normal breath sounds. No wheezing. No rhonchi. No rales.   Cardiovascular:      Normal rate. Regular rhythm. Normal S1. Normal S2.      Murmurs: There is no murmur.   Pulses:     Intact distal pulses.   Edema:     Peripheral edema absent.   Abdominal:      General: Bowel sounds are normal. There is no distension.      Palpations: Abdomen is soft.      Tenderness: There is no abdominal tenderness.   Musculoskeletal: Normal range of motion.      Cervical back: Normal range of motion and neck supple. Skin:     General: Skin is warm and dry.   Neurological:      Mental Status: Alert and oriented to person, place and time.   Psychiatric:         Attention and Perception: Attention normal.         Mood and Affect: Mood normal.         Speech: Speech normal.         Behavior: Behavior is cooperative.          Assessment and Plan     Assessment:  1. Stable angina pectoris (HCC)    2. Coronary artery disease involving native coronary artery of native heart without angina pectoris    3. Primary hypertension    4. Hyperlipidemia, unspecified hyperlipidemia type    5. Statin intolerance         1. Angina: She describes stable angina.  She has chest tightness with exertion when working  outside but not when working inside when she is usually on a flat surface.  This is relieved with rest.  This sensation feels different than her previous anginal equivalent when she had stent to the mid LAD.  2. Coronary artery disease: She had classic angina in May 2015 and ultimately was found to have a 90% stenosis of the mid LAD which was treated with angioplasty and a 2.5 x 28 mm Xience drug-eluting stent.  There was some pinching of the origin of the diagonal but it had good blood flow; her other coronary arteries were normal.  Her medical therapy includes 75 mg Praluent, 81 mg aspirin, and Bystolic.  She is having stable angina as described above.  3. Hypertension: Controlled on 2.5 mg Bystolic daily.  4. Hyperlipidemia: She has not been able to tolerate statins but is now on Praluent; her LDL was at goal when checked in November 2022.    Ms. Garcia is a patient of Dr. Martin with known CAD of the mid LAD which was treated with angioplasty and stent in May 2015.  She now presents with stable angina when exercising with exertion outdoors.  It is relieved with rest.  I checked a treadmill stress test today; there was no ECG evidence of ischemia but she did have chest pain after walking at 3 minutes.  This is a significant change compared to her previous treadmill stress test in 2017 when she was able to exercise for 10 minutes.  I discussed her case with Dr. Martin; he recommends cardiac catheterization.  I discussed the risks and benefits of this procedure with Ms. Garcia and she is willing to proceed.      No follow-ups on file.  Orders Placed This Encounter   Procedures   • Treadmill Stress Test   • ECG 12 Lead   • SCANNED EKG      No orders of the defined types were placed in this encounter.        Thank you for the opportunity to participate in this patient's care.    YEHUDA King    This office note has been dictated.

## 2023-02-20 NOTE — TELEPHONE ENCOUNTER
Returned call to Taty Garcia.  Patient is requesting an appointment today.  Patient agreeable to see Karla at 3 pm and has been scheduled.    Please let me know if there is anything else you would like me to do for this patient.    Thank you,  Angie PHILLIPS RN  Triage Nurse [facility]

## 2023-02-20 NOTE — PROGRESS NOTES
"      [facility] CARDIOLOGY  [address] [facility] [address]  [facility] [address]  Phone: [phone]  Patient Name: Taty Garcia  :1960  Age: 62 y.o.  Primary Cardiologist: Dk Martin MD  Encounter Provider:  YEHUDA Mcghee    History of Present Illness     Taty Garcia is a 62 y.o.  female whose medical history includes hypertension, hyperlipidemia, and GERD.  She is followed in our office by Dr. Martin for coronary artery disease. I have reviewed the past medical records in preparation of today's visit.     23 Follow-up:  She is here for follow-up of chest pain and I am seeing her for the first time today.  She exercises daily.  She does not exercise when the weather is cold.  She has noticed that when exercising outside when the weather is nice she has chest pain when walking up hills.  She does not have chest pain when thank you she exercises at home but this is usually on a flat surface.  This chest tightness feels different than her previous angina.  She denies dyspnea with exertion, palpitations, lightheadedness, or leg swelling.  She is taking her medications as prescribed.  Her blood pressure at home is similar to today's reading.    Data Review     The following data was reviewed by YEHUDA Mcghee on 23:    Vital Signs:   /70   Pulse 70   Ht 167.6 cm (66\")   BMI 21.89 kg/m²       Weight:  Wt Readings from Last 3 Encounters:   22 61.5 kg (135 lb 9.6 oz)   06/15/22 57.9 kg (127 lb 9.6 oz)   06/15/22 58 kg (127 lb 12.8 oz)     Body mass index is 21.89 kg/m².    Below is a summary of pertinent cardiology findings:  • May 2015 she was evaluated for classic angina; stress test was abnormal.  Cardiac catheterization showed a 90% stenosis in the mid LAD which was treated with angioplasty and a 2.75 x 28 mm Xience drug-eluting stent; the was some pinching of the origin of the diagonal but with good blood flow.  " The other coronary arteries were normal.  Her LV was normal.  • May 2017 treadmill stress test showed no EKG changes and normal blood pressure and response to exercise.  She exercised for 10-1/2 minutes.  She did complain of atypical chest pain that got worse with exercise; this was considered a low risk study.  • December 2020 echocardiogram showed EF 61%, normal LV size and wall thickness, grade 1 LV diastolic dysfunction, mild calcification of the aortic valve without regurgitation or stenosis, trace mitral valve regurgitation, mild tricuspid valve regurgitation, and normal RVSP.    Labs:  • 11/23/2022:  cr 0.9, K 4.8, , otherwise unremarkable CMP, TSH 3.480, Chol 181, , LDL 64, Trig 71, Hgb 13.7, Plt 384      ECG 12 Lead    Date/Time: 2/20/2023 3:05 PM  Performed by: Jo-Ann Mccarty APRN  Authorized by: Jo-Ann Mccarty APRN   Comparison: compared with previous ECG from 6/15/2022  Similar to previous ECG  Rhythm: sinus rhythm  Rate: normal  BPM: 70  T flattening: aVL and V2  Other findings: T wave abnormality    Clinical impression: non-specific ECG            Medications     Allergies as of 02/20/2023 - Reviewed 02/20/2023   Allergen Reaction Noted   • Lipitor [atorvastatin] Myalgia 08/22/2016   • Praluent [alirocumab] Itching 08/19/2020   • Pravachol [pravastatin sodium] Myalgia 08/22/2016   • Pravastatin Myalgia 08/22/2016   • Codeine Itching 05/13/2016   • Oxycodone-acetaminophen Itching 12/15/2016         Current Outpatient Medications:   •  aspirin 81 MG EC tablet, Take 81 mg by mouth Daily., Disp: , Rfl:   •  azelastine (ASTELIN) 0.1 % nasal spray, 2 sprays into the nostril(s) as directed by provider 2 (Two) Times a Day. Use in each nostril as directed, Disp: 1 each, Rfl: 3  •  Cholecalciferol (VITAMIN D-3) 1000 UNITS capsule, Take 1,000 Units by mouth Daily., Disp: , Rfl:   •  cyclobenzaprine (FLEXERIL) 5 MG tablet, TAKE 1 TABLET BY MOUTH THREE TIMES A DAY, Disp: 90  tablet, Rfl: 0  •  diclofenac (VOLTAREN) 50 MG EC tablet, TAKE 1 TABLET BY MOUTH TWO TIMES A DAY, Disp: 180 tablet, Rfl: 0  •  hydrOXYzine (ATARAX) 25 MG tablet, TAKE 1 TABLET BY MOUTH THREE TIMES A DAY AS NEEDED FOR ANXIETY, Disp: 90 tablet, Rfl: 0  •  levothyroxine (SYNTHROID, LEVOTHROID) 25 MCG tablet, Take 1 tablet by mouth Daily., Disp: 90 tablet, Rfl: 1  •  Multiple Minerals-Vitamins (CALCIUM CITRATE +) tablet, Take 1 tablet by mouth Daily., Disp: , Rfl:   •  nebivolol (BYSTOLIC) 2.5 MG tablet, Take 1 tablet by mouth Daily., Disp: 90 tablet, Rfl: 1  •  omeprazole (priLOSEC) 40 MG capsule, TAKE 1 CAPSULE BY MOUTH EVERY DAY for 4 weeks, Disp: , Rfl:   •  Praluent 75 MG/ML solution auto-injector, INJECT 75 MG INTO THE SKIN OF THE APPROPRIATE AREA AS DIRECTED EVERY 14 DAYS, Disp: 6 mL, Rfl: 3     Past History, Review of Systems, Exam     Past Medical History:   Diagnosis Date   • Allergic    • Anxiety    • Arthritis    • Cervical strain 10/11/2018   • Cervicalgia    • Chest pain    • Coronary artery disease    • Depression    • Gastroesophageal reflux disease without esophagitis 8/2/2021   • History of coronary artery stent placement 6/28/2018   • Hyperlipidemia 3/29/2017   • Hypertension    • Insomnia    • SOB (shortness of breath)    • Tendonitis    • UTI (urinary tract infection)        Past Surgical History:   has a past surgical history that includes Neck surgery; Subtotal Hysterectomy; Cardiac catheterization; Hysterectomy; Spine surgery; Breast surgery; Cardiac catheterization; Colonoscopy (approx 2013); Colonoscopy (N/A, 12/20/2021); Breast biopsy (Left); and Augmentation mammaplasty (Bilateral, 2008).     Social History     Socioeconomic History   • Marital status:    Tobacco Use   • Smoking status: Never   • Smokeless tobacco: Never   • Tobacco comments:     CAFFEINE USE- 2 GLASSES TEA DAILY   Substance and Sexual Activity   • Alcohol use: Yes     Alcohol/week: 2.0 standard drinks     Types: 1  Glasses of wine, 1 Shots of liquor per week     Comment: occ   • Drug use: No   • Sexual activity: Defer       Review of Systems   Cardiovascular: Positive for chest pain. Negative for claudication, cyanosis, dyspnea on exertion, irregular heartbeat, leg swelling, near-syncope, orthopnea, palpitations, paroxysmal nocturnal dyspnea and syncope.       Vitals reviewed.   Constitutional:       Appearance: Not in distress.   Eyes:      Conjunctiva/sclera: Conjunctivae normal.      Pupils: Pupils are equal, round, and reactive to light.   HENT:      Head: Normocephalic.      Nose: Nose normal.    Mouth/Throat:      Pharynx: Oropharynx is clear.   Neck:      Vascular: JVD normal.   Pulmonary:      Effort: Pulmonary effort is normal.      Breath sounds: Normal breath sounds. No wheezing. No rhonchi. No rales.   Cardiovascular:      Normal rate. Regular rhythm. Normal S1. Normal S2.      Murmurs: There is no murmur.   Pulses:     Intact distal pulses.   Edema:     Peripheral edema absent.   Abdominal:      General: Bowel sounds are normal. There is no distension.      Palpations: Abdomen is soft.      Tenderness: There is no abdominal tenderness.   Musculoskeletal: Normal range of motion.      Cervical back: Normal range of motion and neck supple. Skin:     General: Skin is warm and dry.   Neurological:      Mental Status: Alert and oriented to person, place and time.   Psychiatric:         Attention and Perception: Attention normal.         Mood and Affect: Mood normal.         Speech: Speech normal.         Behavior: Behavior is cooperative.          Assessment and Plan     Assessment:  1. Stable angina pectoris (HCC)    2. Coronary artery disease involving native coronary artery of native heart without angina pectoris    3. Primary hypertension    4. Hyperlipidemia, unspecified hyperlipidemia type    5. Statin intolerance         1. Angina: She describes stable angina.  She has chest tightness with exertion when working  outside but not when working inside when she is usually on a flat surface.  This is relieved with rest.  This sensation feels different than her previous anginal equivalent when she had stent to the mid LAD.  2. Coronary artery disease: She had classic angina in May 2015 and ultimately was found to have a 90% stenosis of the mid LAD which was treated with angioplasty and a 2.5 x 28 mm Xience drug-eluting stent.  There was some pinching of the origin of the diagonal but it had good blood flow; her other coronary arteries were normal.  Her medical therapy includes 75 mg Praluent, 81 mg aspirin, and Bystolic.  She is having stable angina as described above.  3. Hypertension: Controlled on 2.5 mg Bystolic daily.  4. Hyperlipidemia: She has not been able to tolerate statins but is now on Praluent; her LDL was at goal when checked in November 2022.    Ms. Garcia is a patient of Dr. Martin with known CAD of the mid LAD which was treated with angioplasty and stent in May 2015.  She now presents with stable angina when exercising with exertion outdoors.  It is relieved with rest.  I checked a treadmill stress test today; there was no ECG evidence of ischemia but she did have chest pain after walking at 3 minutes.  This is a significant change compared to her previous treadmill stress test in 2017 when she was able to exercise for 10 minutes.  I discussed her case with Dr. Martin; he recommends cardiac catheterization.  I discussed the risks and benefits of this procedure with Ms. Garcia and she is willing to proceed.      No follow-ups on file.  Orders Placed This Encounter   Procedures   • Treadmill Stress Test   • ECG 12 Lead   • SCANNED EKG      No orders of the defined types were placed in this encounter.        Thank you for the opportunity to participate in this patient's care.    YEHUDA King    This office note has been dictated.

## 2023-02-21 PROBLEM — I20.9 ANGINA PECTORIS (HCC): Status: ACTIVE | Noted: 2023-02-21

## 2023-02-21 LAB
BH CV STRESS BP STAGE 1: NORMAL
BH CV STRESS DURATION MIN STAGE 1: 3
BH CV STRESS DURATION MIN STAGE 2: 1
BH CV STRESS DURATION SEC STAGE 1: 0
BH CV STRESS DURATION SEC STAGE 2: 44
BH CV STRESS GRADE STAGE 1: 10
BH CV STRESS GRADE STAGE 2: 12
BH CV STRESS HR STAGE 1: 115
BH CV STRESS HR STAGE 2: 138
BH CV STRESS METS STAGE 1: 5
BH CV STRESS METS STAGE 2: 6
BH CV STRESS PROTOCOL 1: NORMAL
BH CV STRESS RECOVERY BP: NORMAL MMHG
BH CV STRESS RECOVERY HR: 84 BPM
BH CV STRESS SPEED STAGE 1: 1.7
BH CV STRESS SPEED STAGE 2: 2.5
BH CV STRESS STAGE 1: 1
BH CV STRESS STAGE 2: 2
MAXIMAL PREDICTED HEART RATE: 160 BPM
PERCENT MAX PREDICTED HR: 86.25 %
STRESS BASELINE BP: NORMAL MMHG
STRESS BASELINE HR: 85 BPM
STRESS PERCENT HR: 101 %
STRESS POST ESTIMATED WORKLOAD: 6 METS
STRESS POST EXERCISE DUR MIN: 4 MIN
STRESS POST EXERCISE DUR SEC: 44 SEC
STRESS POST PEAK BP: NORMAL MMHG
STRESS POST PEAK HR: 138 BPM
STRESS TARGET HR: 136 BPM

## 2023-02-23 ENCOUNTER — HOSPITAL ENCOUNTER (OUTPATIENT)
Facility: HOSPITAL | Age: 63
Setting detail: HOSPITAL OUTPATIENT SURGERY
Discharge: HOME | End: 2023-02-23
Attending: INTERNAL MEDICINE | Admitting: INTERNAL MEDICINE
Payer: COMMERCIAL

## 2023-02-23 VITALS
OXYGEN SATURATION: 97 % | BODY MASS INDEX: 22.33 KG/M2 | SYSTOLIC BLOOD PRESSURE: 120 MMHG | RESPIRATION RATE: 18 BRPM | HEIGHT: 65 IN | DIASTOLIC BLOOD PRESSURE: 71 MMHG | HEART RATE: 62 BPM | WEIGHT: 134 LBS | TEMPERATURE: 97.5 F

## 2023-02-23 DIAGNOSIS — I25.118 CORONARY ARTERY DISEASE OF NATIVE ARTERY OF NATIVE HEART WITH STABLE ANGINA PECTORIS: ICD-10-CM

## 2023-02-23 DIAGNOSIS — I20.9 ANGINA PECTORIS: ICD-10-CM

## 2023-02-23 LAB — ACT BLD: 245 SECONDS (ref 82–152)

## 2023-02-23 PROCEDURE — 93571 IV DOP VEL&/PRESS C FLO 1ST: CPT | Performed by: INTERNAL MEDICINE

## 2023-02-23 PROCEDURE — 93458 L HRT ARTERY/VENTRICLE ANGIO: CPT | Performed by: INTERNAL MEDICINE

## 2023-02-23 PROCEDURE — C1894 INTRO/SHEATH, NON-LASER: HCPCS | Performed by: INTERNAL MEDICINE

## 2023-02-23 PROCEDURE — C1769 GUIDE WIRE: HCPCS | Performed by: INTERNAL MEDICINE

## 2023-02-23 PROCEDURE — 25010000002 HEPARIN (PORCINE) PER 1000 UNITS: Performed by: INTERNAL MEDICINE

## 2023-02-23 PROCEDURE — 25010000002 FENTANYL CITRATE (PF) 50 MCG/ML SOLUTION: Performed by: INTERNAL MEDICINE

## 2023-02-23 PROCEDURE — 25010000002 MIDAZOLAM PER 1 MG: Performed by: INTERNAL MEDICINE

## 2023-02-23 PROCEDURE — 99152 MOD SED SAME PHYS/QHP 5/>YRS: CPT | Performed by: INTERNAL MEDICINE

## 2023-02-23 PROCEDURE — 25010000002 PROTAMINE SULFATE PER 10 MG: Performed by: INTERNAL MEDICINE

## 2023-02-23 PROCEDURE — 85347 COAGULATION TIME ACTIVATED: CPT

## 2023-02-23 PROCEDURE — 0 IOPAMIDOL PER 1 ML: Performed by: INTERNAL MEDICINE

## 2023-02-23 PROCEDURE — C1887 CATHETER, GUIDING: HCPCS | Performed by: INTERNAL MEDICINE

## 2023-02-23 RX ORDER — SODIUM CHLORIDE 9 MG/ML
75 INJECTION, SOLUTION INTRAVENOUS CONTINUOUS
Status: DISCONTINUED | OUTPATIENT
Start: 2023-02-23 | End: 2023-02-23 | Stop reason: HOSPADM

## 2023-02-23 RX ORDER — HEPARIN SODIUM 1000 [USP'U]/ML
INJECTION, SOLUTION INTRAVENOUS; SUBCUTANEOUS
Status: DISCONTINUED | OUTPATIENT
Start: 2023-02-23 | End: 2023-02-23 | Stop reason: HOSPADM

## 2023-02-23 RX ORDER — SODIUM CHLORIDE 9 MG/ML
40 INJECTION, SOLUTION INTRAVENOUS AS NEEDED
Status: DISCONTINUED | OUTPATIENT
Start: 2023-02-23 | End: 2023-02-23 | Stop reason: HOSPADM

## 2023-02-23 RX ORDER — SODIUM CHLORIDE 0.9 % (FLUSH) 0.9 %
10 SYRINGE (ML) INJECTION AS NEEDED
Status: DISCONTINUED | OUTPATIENT
Start: 2023-02-23 | End: 2023-02-23 | Stop reason: HOSPADM

## 2023-02-23 RX ORDER — SODIUM CHLORIDE 0.9 % (FLUSH) 0.9 %
10 SYRINGE (ML) INJECTION EVERY 12 HOURS SCHEDULED
Status: DISCONTINUED | OUTPATIENT
Start: 2023-02-23 | End: 2023-02-23 | Stop reason: HOSPADM

## 2023-02-23 RX ORDER — PROTAMINE SULFATE 10 MG/ML
INJECTION, SOLUTION INTRAVENOUS
Status: DISCONTINUED | OUTPATIENT
Start: 2023-02-23 | End: 2023-02-23 | Stop reason: HOSPADM

## 2023-02-23 RX ORDER — FENTANYL CITRATE 50 UG/ML
INJECTION, SOLUTION INTRAMUSCULAR; INTRAVENOUS
Status: DISCONTINUED | OUTPATIENT
Start: 2023-02-23 | End: 2023-02-23 | Stop reason: HOSPADM

## 2023-02-23 RX ORDER — LIDOCAINE HYDROCHLORIDE 20 MG/ML
INJECTION, SOLUTION INFILTRATION; PERINEURAL
Status: DISCONTINUED | OUTPATIENT
Start: 2023-02-23 | End: 2023-02-23 | Stop reason: HOSPADM

## 2023-02-23 RX ORDER — ACETAMINOPHEN 325 MG/1
650 TABLET ORAL EVERY 4 HOURS PRN
Status: DISCONTINUED | OUTPATIENT
Start: 2023-02-23 | End: 2023-02-23 | Stop reason: HOSPADM

## 2023-02-23 RX ORDER — NEBIVOLOL 5 MG/1
5 TABLET ORAL DAILY
Qty: 90 TABLET | Refills: 3 | Status: SHIPPED | OUTPATIENT
Start: 2023-02-23

## 2023-02-23 RX ORDER — VERAPAMIL HYDROCHLORIDE 2.5 MG/ML
INJECTION, SOLUTION INTRAVENOUS
Status: DISCONTINUED | OUTPATIENT
Start: 2023-02-23 | End: 2023-02-23 | Stop reason: HOSPADM

## 2023-02-23 RX ORDER — NITROGLYCERIN 0.4 MG/1
TABLET SUBLINGUAL
Qty: 45 TABLET | Refills: 11 | Status: SHIPPED | OUTPATIENT
Start: 2023-02-23

## 2023-02-23 RX ORDER — MIDAZOLAM HYDROCHLORIDE 1 MG/ML
INJECTION INTRAMUSCULAR; INTRAVENOUS
Status: DISCONTINUED | OUTPATIENT
Start: 2023-02-23 | End: 2023-02-23 | Stop reason: HOSPADM

## 2023-02-23 RX ADMIN — SODIUM CHLORIDE 75 ML/HR: 9 INJECTION, SOLUTION INTRAVENOUS at 07:26

## 2023-02-23 NOTE — DISCHARGE INSTRUCTIONS
[facility]  [address] [place] [address]    Coronary Angiogram (Radial/Ulnar Approach) After Care    Refer to this sheet in the next few weeks. These instructions provide you with information on caring for yourself after your procedure. Your caregiver may also give you more specific instructions. Your treatment has been planned according to current medical practices, but problems sometimes occur. Call your caregiver if you have any problems or questions after your procedure.    Home Care Instructions:  You may shower the day after the procedure. Remove the bandage (dressing) and gently wash the site with plain soap and water. Gently pat the site dry. You may apply a band aid daily for 2 days if desired.    Do not apply powder or lotion to the site.  Do not submerge the affected site in water for 3 to 5 days or until the site is completely healed.   Do not lift, push or pull anything over 5 pounds for 5 days after your procedure or as directed by your physician.  As a reference, a gallon of milk weighs 8 pounds.   Inspect the site at least twice daily. You may notice some bruising at the site and it may be tender for 1 to 2 weeks.     Increase your fluid intake for the next 2 days.    Keep arm elevated for 24 hours. For the remainder of the day, keep your arm in “Pledge of Allegiance” position when up and about.     You may drive 24 hours after the procedure unless otherwise instructed by your caregiver.  Do not operate machinery or power tools for 24 hours.  A responsible adult should be with you for the first 24 hours after you arrive home. Do not make any important legal decisions or sign legal papers for 24 hours.  Do not drink alcohol for 24 hours.    Metformin or any medications containing Metformin should not be taken for 48 hours after your procedure.      Call Your Doctor if:   You have unusual pain at the radial/ulnar (wrist) site.  You have redness, warmth, swelling, or pain at the  radial/ulnar (wrist) site.  You have drainage (other than a small amount of blood on the dressing).  `You have chills or a fever > 101.  Your arm becomes pale or dark, cool, tingly, or numb.  You develop chest pain, shortness of breath, feel faint or pass out.    You have heavy bleeding from the site, hold pressure on the site for 20 minutes.  If the bleeding stops, apply a fresh bandage and call your cardiologist.  However, if you        continue to have bleeding, call 911 and continue to apply pressure to the site.   You have any symptoms of a stroke.  Remember BE FAST  B-balance. Sudden trouble walking or loss of balance.  E-eyes.  Sudden changes in how you see or a sudden onset of a very bad headache.   F-face. Sudden weakness or loss of feeling of the face or facial droop on one side.   A-arms Sudden weakness or numbness in one arm.  One arm drifts down if they are both held out in front of you. This happens suddenly and usually on one side of the body.   S-speech.  Sudden trouble speaking, slurred speech or trouble understanding what are saying.   T-time  Time to call emergency services.  Write down the symptoms and the time they started.

## 2023-02-27 ENCOUNTER — TELEPHONE (OUTPATIENT)
Dept: FAMILY MEDICINE CLINIC | Facility: CLINIC | Age: 63
End: 2023-02-27
Payer: COMMERCIAL

## 2023-02-27 NOTE — TELEPHONE ENCOUNTER
Caller: Taty Garcia    Relationship: Self    Best call back number: [phone]    What medication are you requesting: ARTHRITIS CREAM    What are your current symptoms: ARTHRITIS IN HANDS    How long have you been experiencing symptoms: PAIN IN BOTH HANDS.    Have you had these symptoms before:    [x] Yes  [] No    Have you been treated for these symptoms before:   [x] Yes  [] No    If a prescription is needed, what is your preferred pharmacy and phone number:      Additional notes: PATIENT CALLED STATED THAT HER HEART DOCTOR TOOK HER OFF OF HER ARTHRITIS MEDICATION THAT SHE TOOK TWICE A DAY, NOT SURE OF NAME OF MEDICATION.  SHE WANTS TO KNOW IF YOU CAN ORDER HER A CREAM FOR ARTHRITIS FOR HER HANDS.

## 2023-03-02 RX ORDER — HYDROXYZINE HYDROCHLORIDE 25 MG/1
TABLET, FILM COATED ORAL
Qty: 90 TABLET | Refills: 0 | Status: SHIPPED | OUTPATIENT
Start: 2023-03-02

## 2023-03-16 DIAGNOSIS — M19.90 ARTHRITIS: ICD-10-CM

## 2023-03-17 ENCOUNTER — HOSPITAL ENCOUNTER (OUTPATIENT)
Dept: CARDIOLOGY | Facility: HOSPITAL | Age: 63
Discharge: HOME | End: 2023-03-17
Admitting: INTERNAL MEDICINE
Payer: COMMERCIAL

## 2023-03-17 VITALS — HEIGHT: 65 IN | WEIGHT: 134.04 LBS | BODY MASS INDEX: 22.33 KG/M2

## 2023-03-17 DIAGNOSIS — I25.118 CORONARY ARTERY DISEASE OF NATIVE ARTERY OF NATIVE HEART WITH STABLE ANGINA PECTORIS: ICD-10-CM

## 2023-03-17 PROCEDURE — 93306 TTE W/DOPPLER COMPLETE: CPT | Performed by: INTERNAL MEDICINE

## 2023-03-17 PROCEDURE — 93306 TTE W/DOPPLER COMPLETE: CPT

## 2023-03-17 RX ORDER — CYCLOBENZAPRINE HCL 5 MG
TABLET ORAL
Qty: 90 TABLET | Refills: 0 | Status: SHIPPED | OUTPATIENT
Start: 2023-03-17

## 2023-03-18 LAB
AORTIC ARCH: 1.8 CM
ASCENDING AORTA: 2.9 CM
BH CV ECHO MEAS - ACS: 1.87 CM
BH CV ECHO MEAS - AO MAX PG: 6.1 MMHG
BH CV ECHO MEAS - AO MEAN PG: 3.6 MMHG
BH CV ECHO MEAS - AO ROOT DIAM: 3 CM
BH CV ECHO MEAS - AO V2 MAX: 123.8 CM/SEC
BH CV ECHO MEAS - AO V2 VTI: 26.8 CM
BH CV ECHO MEAS - AVA(I,D): 2.39 CM2
BH CV ECHO MEAS - EDV(CUBED): 50.6 ML
BH CV ECHO MEAS - EDV(MOD-SP2): 70 ML
BH CV ECHO MEAS - EDV(MOD-SP4): 53 ML
BH CV ECHO MEAS - EF(MOD-BP): 62.4 %
BH CV ECHO MEAS - EF(MOD-SP2): 62.9 %
BH CV ECHO MEAS - EF(MOD-SP4): 60.4 %
BH CV ECHO MEAS - ESV(CUBED): 22.5 ML
BH CV ECHO MEAS - ESV(MOD-SP2): 26 ML
BH CV ECHO MEAS - ESV(MOD-SP4): 21 ML
BH CV ECHO MEAS - FS: 23.7 %
BH CV ECHO MEAS - IVS/LVPW: 1.01 CM
BH CV ECHO MEAS - IVSD: 0.9 CM
BH CV ECHO MEAS - LAT PEAK E' VEL: 8.2 CM/SEC
BH CV ECHO MEAS - LV DIASTOLIC VOL/BSA (35-75): 29.7 CM2
BH CV ECHO MEAS - LV MASS(C)D: 95.8 GRAMS
BH CV ECHO MEAS - LV MAX PG: 3.6 MMHG
BH CV ECHO MEAS - LV MEAN PG: 1.76 MMHG
BH CV ECHO MEAS - LV SYSTOLIC VOL/BSA (12-30): 11.8 CM2
BH CV ECHO MEAS - LV V1 MAX: 94.3 CM/SEC
BH CV ECHO MEAS - LV V1 VTI: 22.4 CM
BH CV ECHO MEAS - LVIDD: 3.7 CM
BH CV ECHO MEAS - LVIDS: 2.8 CM
BH CV ECHO MEAS - LVOT AREA: 2.9 CM2
BH CV ECHO MEAS - LVOT DIAM: 1.91 CM
BH CV ECHO MEAS - LVPWD: 0.89 CM
BH CV ECHO MEAS - MED PEAK E' VEL: 7.5 CM/SEC
BH CV ECHO MEAS - MV A DUR: 0.19 SEC
BH CV ECHO MEAS - MV A MAX VEL: 87 CM/SEC
BH CV ECHO MEAS - MV DEC SLOPE: 267.2 CM/SEC2
BH CV ECHO MEAS - MV DEC TIME: 0.23 MSEC
BH CV ECHO MEAS - MV E MAX VEL: 68.6 CM/SEC
BH CV ECHO MEAS - MV E/A: 0.79
BH CV ECHO MEAS - MV MAX PG: 3.7 MMHG
BH CV ECHO MEAS - MV MEAN PG: 1.8 MMHG
BH CV ECHO MEAS - MV P1/2T: 105.4 MSEC
BH CV ECHO MEAS - MV V2 VTI: 33.4 CM
BH CV ECHO MEAS - MVA(P1/2T): 2.09 CM2
BH CV ECHO MEAS - MVA(VTI): 1.92 CM2
BH CV ECHO MEAS - PA ACC TIME: 0.12 SEC
BH CV ECHO MEAS - PA PR(ACCEL): 25.1 MMHG
BH CV ECHO MEAS - PA V2 MAX: 74.9 CM/SEC
BH CV ECHO MEAS - PULM A REVS DUR: 0.13 SEC
BH CV ECHO MEAS - PULM A REVS VEL: 24 CM/SEC
BH CV ECHO MEAS - PULM DIAS VEL: 35.1 CM/SEC
BH CV ECHO MEAS - PULM S/D: 1.35
BH CV ECHO MEAS - PULM SYS VEL: 47.6 CM/SEC
BH CV ECHO MEAS - QP/QS: 0.55
BH CV ECHO MEAS - RAP SYSTOLE: 3 MMHG
BH CV ECHO MEAS - RV MAX PG: 0.84 MMHG
BH CV ECHO MEAS - RV V1 MAX: 45.8 CM/SEC
BH CV ECHO MEAS - RV V1 VTI: 12.9 CM
BH CV ECHO MEAS - RVOT DIAM: 1.86 CM
BH CV ECHO MEAS - RVSP: 23.6 MMHG
BH CV ECHO MEAS - SI(MOD-SP2): 24.7 ML/M2
BH CV ECHO MEAS - SI(MOD-SP4): 18 ML/M2
BH CV ECHO MEAS - SUP REN AO DIAM: 2.1 CM
BH CV ECHO MEAS - SV(LVOT): 64 ML
BH CV ECHO MEAS - SV(MOD-SP2): 44 ML
BH CV ECHO MEAS - SV(MOD-SP4): 32 ML
BH CV ECHO MEAS - SV(RVOT): 35.1 ML
BH CV ECHO MEAS - TAPSE (>1.6): 2.01 CM
BH CV ECHO MEAS - TR MAX PG: 20.6 MMHG
BH CV ECHO MEAS - TR MAX VEL: 226.7 CM/SEC
BH CV ECHO MEASUREMENTS AVERAGE E/E' RATIO: 8.74
BH CV XLRA - RV BASE: 2.8 CM
BH CV XLRA - RV LENGTH: 7.3 CM
BH CV XLRA - RV MID: 2.9 CM
BH CV XLRA - TDI S': 9.7 CM/SEC
LEFT ATRIUM VOLUME INDEX: 37 ML/M2
MAXIMAL PREDICTED HEART RATE: 160 BPM
SINUS: 3 CM
STJ: 2.49 CM
STRESS TARGET HR: 136 BPM

## 2023-03-21 ENCOUNTER — TELEPHONE (OUTPATIENT)
Dept: CARDIOLOGY | Facility: CLINIC | Age: 63
End: 2023-03-21
Payer: COMMERCIAL

## 2023-03-21 NOTE — TELEPHONE ENCOUNTER
Patient was given Lipitor While in the hosp. She couldn't tolerate the Statin. She states you gave her a couple of boxes of livalo and she took 1/2 tablet a day. She is able to tolerate the livalo. Do you want her to continue taking the Livalo along with her being on Praluent . If so she will need this sent in to her pharm.    [phone]      1.  Her medical therapy includes 75 mg Praluent, 81 mg aspirin, and Bystolic.  She is having stable angina as described above.  2. Hypertension: Controlled on 2.5 mg Bystolic daily.  3. Hyperlipidemia: She has not been able to tolerate statins but is now on Praluent; her LDL was at goal when checked in November 2022.     Ms. Garcia is a patient of Dr. Martin with known CAD of the mid LAD which was treated with angioplasty and stent in May 2015.  She now presents with stable angina when exercising with exertion outdoors.  It is relieved with rest.  I checked a treadmill stress test today; there was no ECG evidence of ischemia but she did have chest pain after walking at 3 minutes.  This is a significant change compared to her previous treadmill stress test in 2017 when she was able to exercise for 10 minutes.  I discussed her case with Dr. Martin; he recommends cardiac catheterization.  I discussed the risks and benefits of this procedure with Ms. Garcia and she is willing to proceed.        No follow-ups on file.      Orders Placed This Encounter   Procedures   • Treadmill Stress Test   • ECG 12 Lead   • SCANNED EKG      No orders of the defined types were placed in this encounter.           Thank you for the opportunity to participate in this patient's care.     YEHUDA King     This office note has been dictated.        Additional Documentation

## 2023-03-21 NOTE — TELEPHONE ENCOUNTER
----- Message from Taty Garcia sent at 3/20/2023  9:20 PM EDT -----  Regarding: My Blood Pressure  Contact: [phone]  I was put in a statin in the hospital. I am taking half a pill. I have had no problems with it. I am almost out of it. Do you want to give me a prescription to continue taking it?    Sara Garcia

## 2023-03-24 ENCOUNTER — TELEPHONE (OUTPATIENT)
Dept: CARDIOLOGY | Facility: CLINIC | Age: 63
End: 2023-03-24
Payer: COMMERCIAL

## 2023-03-24 NOTE — TELEPHONE ENCOUNTER
PA completed through [facility]    PA was denied through [facility] stating:      I have faxed [facility] an appeal letter in hopes to get this approved since pt has t/f multiple statin medications

## 2023-03-29 ENCOUNTER — OFFICE VISIT (OUTPATIENT)
Dept: CARDIOLOGY | Facility: CLINIC | Age: 63
End: 2023-03-29
Payer: COMMERCIAL

## 2023-03-29 VITALS
HEIGHT: 64 IN | WEIGHT: 136 LBS | HEART RATE: 64 BPM | BODY MASS INDEX: 23.22 KG/M2 | DIASTOLIC BLOOD PRESSURE: 70 MMHG | SYSTOLIC BLOOD PRESSURE: 120 MMHG

## 2023-03-29 DIAGNOSIS — I20.9 ANGINA PECTORIS: Primary | ICD-10-CM

## 2023-03-29 DIAGNOSIS — I10 PRIMARY HYPERTENSION: ICD-10-CM

## 2023-03-29 DIAGNOSIS — I25.10 CORONARY ARTERY DISEASE INVOLVING NATIVE CORONARY ARTERY OF NATIVE HEART WITHOUT ANGINA PECTORIS: ICD-10-CM

## 2023-03-29 DIAGNOSIS — E78.5 HYPERLIPIDEMIA, UNSPECIFIED HYPERLIPIDEMIA TYPE: ICD-10-CM

## 2023-03-29 NOTE — PROGRESS NOTES
"[facility] CARDIOLOGY  [address]  [address]  [facility] [address]  Phone: [phone]  Patient Name: Taty Garcia  :1960  Age: 62 y.o.  Primary Cardiologist: Dk Martin MD  Encounter Provider:  YEHUDA Mcghee    History of Present Illness     Taty Garcia is a 62 y.o.  female whose medical history includes hypertension, hyperlipidemia, and GERD.  She is followed in our office by Dr. Martin for coronary artery disease. I have reviewed the past medical records in preparation of today's visit.     23 Follow-up:  She is here for follow-up of cardiac catheterization for possible angina.  She had cardiac catheterization with Dr. Martin in 2023; no significant obstructive coronary artery disease was found.  She was started on Livalo in addition to her Praluent is currently out as its not being covered by her insurance.  Overall she is feels better but she has noticed that she has fat chest tightness and dyspnea when walking inclines; when this happens she is careful to walk slowly.  She does not have chest pain or dyspnea at any other time.  She denies palpitations or leg swelling.  She is taking her medications as scribed.    Data Review     The following data was reviewed by YEHUDA Mcghee on 23:    Vital Signs:   /70   Pulse 64   Ht 162.6 cm (64\")   Wt 61.7 kg (136 lb)   BMI 23.34 kg/m²       Weight:  Wt Readings from Last 3 Encounters:   23 61.7 kg (136 lb)   23 60.8 kg (134 lb 0.6 oz)   23 60.8 kg (134 lb)     Body mass index is 23.34 kg/m².    Below is a summary of pertinent cardiology findings:  • May 2015 she was evaluated for classic angina; stress test was abnormal.  Cardiac catheterization showed a 90% stenosis in the mid LAD which was treated with angioplasty and a 2.75 x 28 mm Xience drug-eluting stent; the was some pinching of the origin of the diagonal but with good " blood flow.  The other coronary arteries were normal.  Her LV was normal.  • May 2017 treadmill stress test showed no EKG changes and normal blood pressure and response to exercise.  She exercised for 10-1/2 minutes.  She did complain of atypical chest pain that got worse with exercise; this was considered a low risk study.  • December 2020 echocardiogram showed EF 61%, normal LV size and wall thickness, grade 1 LV diastolic dysfunction, mild calcification of the aortic valve without regurgitation or stenosis, trace mitral valve regurgitation, mild tricuspid valve regurgitation, and normal RVSP.  • February 2023 treadmill stress test showed no ECG changes of ischemia but she did chest pain after 4 minutes 44 seconds; this is a change compared to May 2017 when she exercised for 10-1/2 minutes.  • February 2023 cardiac catheterization showed normal left main coronary artery, widely patent stent to the mid LAD, normal proximal and distal LAD, large first diagonal that is free of disease, normal left circumflex, 30% proximal disease of a dominant RCA with 40 to 50% mid lesion; RFR of the distal RCA was not hemodynamically significant.  • March 2023 echocardiogram showed EF 62.4%, normal LV cavity size and wall thickness, normal wall motion, grade 1 LV diastolic dysfunction, moderately increased left atrial volume, trace mitral valve regurgitation, trace tricuspid valve regurgitation, and normal RVSP.    Labs:  • 11/23/2022:  cr 0.9, K 4.8, , otherwise unremarkable CMP, TSH 3.480, Chol 181, , LDL 64, Trig 71, Hgb 13.7, Plt 384  • 02/20/2023:  cr 0.9, K 4.3, otherwise unremarkable BMP, hgb 13.1, plt 316      ECG 12 Lead    Date/Time: 3/29/2023 3:18 PM  Performed by: Jo-Ann Mccarty APRN  Authorized by: Jo-Ann Mccarty, YEHUDA   Comparison: compared with previous ECG from 2/20/2023  Similar to previous ECG  Rhythm: sinus rhythm  Rate: normal  BPM: 83  ST Segments: ST segments  normal    Clinical impression: normal ECG            Medications     Allergies as of 03/29/2023 - Reviewed 03/29/2023   Allergen Reaction Noted   • Lipitor [atorvastatin] Myalgia 08/22/2016   • Pravachol [pravastatin sodium] Myalgia 08/22/2016   • Pravastatin Myalgia 08/22/2016   • Codeine Itching 05/13/2016   • Oxycodone-acetaminophen Itching 12/15/2016         Current Outpatient Medications:   •  aspirin 81 MG EC tablet, Take 1 tablet by mouth Daily., Disp: , Rfl:   •  azelastine (ASTELIN) 0.1 % nasal spray, 2 sprays into the nostril(s) as directed by provider 2 (Two) Times a Day. Use in each nostril as directed, Disp: 1 each, Rfl: 3  •  Cholecalciferol (VITAMIN D-3) 1000 UNITS capsule, Take 1,000 Units by mouth Daily., Disp: , Rfl:   •  cyclobenzaprine (FLEXERIL) 5 MG tablet, TAKE 1 TABLET BY MOUTH THREE TIMES A DAY, Disp: 90 tablet, Rfl: 0  •  hydrOXYzine (ATARAX) 25 MG tablet, TAKE 1 TABLET BY MOUTH THREE TIMES A DAY AS NEEDED FOR ANXIETY, Disp: 90 tablet, Rfl: 0  •  levothyroxine (SYNTHROID, LEVOTHROID) 25 MCG tablet, Take 1 tablet by mouth Daily., Disp: 90 tablet, Rfl: 1  •  Multiple Minerals-Vitamins (CALCIUM CITRATE +) tablet, Take 1 tablet by mouth Daily., Disp: , Rfl:   •  nebivolol (BYSTOLIC) 5 MG tablet, Take 1 tablet by mouth Daily., Disp: 90 tablet, Rfl: 3  •  nitroglycerin (NITROSTAT) 0.4 MG SL tablet, 1 under the tongue as needed for angina, may repeat q5mins for up three doses, Disp: 45 tablet, Rfl: 11  •  omeprazole (priLOSEC) 40 MG capsule, TAKE 1 CAPSULE BY MOUTH EVERY DAY for 4 weeks, Disp: , Rfl:   •  pitavastatin calcium (LIVALO) 1 MG tablet tablet, Take 1 tablet by mouth Every Night., Disp: 30 tablet, Rfl: 11  •  Praluent 75 MG/ML solution auto-injector, INJECT 75 MG INTO THE SKIN OF THE APPROPRIATE AREA AS DIRECTED EVERY 14 DAYS, Disp: 6 mL, Rfl: 3     Past History, Review of Systems, Exam     Past Medical History:   Diagnosis Date   • Allergic    • Anxiety    • Arthritis    • Cervical  strain 10/11/2018   • Cervicalgia    • Chest pain    • Coronary artery disease    • Depression    • Gastroesophageal reflux disease without esophagitis 8/2/2021   • History of coronary artery stent placement 6/28/2018   • Hyperlipidemia 3/29/2017   • Hypertension    • Insomnia    • SOB (shortness of breath)    • Tendonitis    • UTI (urinary tract infection)        Past Surgical History:   has a past surgical history that includes Neck surgery; Subtotal Hysterectomy; Cardiac catheterization; Hysterectomy; Spine surgery; Breast surgery; Cardiac catheterization; Colonoscopy (approx 2013); Colonoscopy (N/A, 12/20/2021); Breast biopsy (Left); Augmentation mammaplasty (Bilateral, 2008); Cardiac catheterization (Left, 2/23/2023); Cardiac catheterization (N/A, 2/23/2023); Cardiac catheterization (N/A, 2/23/2023); and Cardiac catheterization (2/23/2023).     Social History     Socioeconomic History   • Marital status:    Tobacco Use   • Smoking status: Never   • Smokeless tobacco: Never   • Tobacco comments:     CAFFEINE USE- 2 GLASSES TEA DAILY   Substance and Sexual Activity   • Alcohol use: Yes     Alcohol/week: 2.0 standard drinks     Types: 1 Glasses of wine, 1 Shots of liquor per week     Comment: OCCASSIONAL   • Drug use: No   • Sexual activity: Defer       Review of Systems   Cardiovascular: Positive for chest pain. Negative for claudication, cyanosis, dyspnea on exertion, irregular heartbeat, leg swelling, near-syncope, orthopnea, palpitations, paroxysmal nocturnal dyspnea and syncope.       Vitals reviewed.   Constitutional:       Appearance: Not in distress.   Eyes:      Conjunctiva/sclera: Conjunctivae normal.      Pupils: Pupils are equal, round, and reactive to light.   HENT:      Head: Normocephalic.      Nose: Nose normal.    Mouth/Throat:      Pharynx: Oropharynx is clear.   Neck:      Vascular: JVD normal.   Pulmonary:      Effort: Pulmonary effort is normal.      Breath sounds: Normal breath sounds.  No wheezing. No rhonchi. No rales.   Cardiovascular:      Normal rate. Regular rhythm. Normal S1. Normal S2.      Murmurs: There is no murmur.   Pulses:     Intact distal pulses.   Edema:     Peripheral edema absent.   Abdominal:      General: Bowel sounds are normal. There is no distension.      Palpations: Abdomen is soft.      Tenderness: There is no abdominal tenderness.   Musculoskeletal: Normal range of motion.      Cervical back: Normal range of motion and neck supple. Skin:     General: Skin is warm and dry.   Neurological:      Mental Status: Alert and oriented to person, place and time.   Psychiatric:         Attention and Perception: Attention normal.         Mood and Affect: Mood normal.         Speech: Speech normal.         Behavior: Behavior is cooperative.          Assessment and Plan     Assessment:  1. Angina pectoris (HCC)    2. Coronary artery disease involving native coronary artery of native heart without angina pectoris    3. Primary hypertension    4. Hyperlipidemia, unspecified hyperlipidemia type         1. Angina: She describes stable angina.  She has chest tightness with exertion when working outside but not when working inside when she is usually on a flat surface.  This is relieved with rest.  No obstructive coronary artery disease noted on February 2023 cardiac catheterization.  She still has angina when walking inclines.  2. Coronary artery disease: She had classic angina in May 2015 and ultimately was found to have a 90% stenosis of the mid LAD which was treated with angioplasty and a 2.5 x 28 mm Xience drug-eluting stent.  There was some pinching of the origin of the diagonal but it had good blood flow; her other coronary arteries were normal.  Her medical therapy includes 75 mg Praluent, 1 mg Livalo daily, 81 mg aspirin, and Bystolic.  No obstructive disease found on February 2023 cardiac catheterization.  3. Hypertension: Controlled on 5 mg Bystolic daily; she is tolerating this  higher dose.  4. Hyperlipidemia: She has not been able to tolerate statins but is now on Praluent; her LDL was at goal when checked in November 2022.  She has also been started on 1 mg Livalo daily.    Ms. Garcia is a patient of Dr. Martin with known CAD of the mid LAD which was treated with angioplasty and stent in May 2015.  She now presents with stable angina when exercising with exertion outdoors.  It is relieved with rest.  I checked a treadmill stress test today; there was no ECG evidence of ischemia but she did have chest pain after walking at 3 minutes.  This is a significant change compared to her previous treadmill stress test in 2017 when she was able to exercise for 10 minutes.  February 2023 she had cardiac catheterization which did not reveal obstructive coronary artery disease.    We discussed the possibility of adding Imdur; she has been careful to mostly avoid having the chest tightness and dyspnea.  For now she does not want to start Imdur and she will discuss that with Dr. Martin when she sees him in April.  I gave her a $18 per month co-pay card and prescription for Livalo today; preauthorization is still pending with our office.          No follow-ups on file.  Orders Placed This Encounter   Procedures   • ECG 12 Lead      New Medications Ordered This Visit   Medications   • pitavastatin calcium (LIVALO) 1 MG tablet tablet     Sig: Take 1 tablet by mouth Every Night.     Dispense:  30 tablet     Refill:  11         Thank you for the opportunity to participate in this patient's care.    YEHUDA King    This office note has been dictated.

## 2023-04-26 ENCOUNTER — OFFICE VISIT (OUTPATIENT)
Dept: CARDIOLOGY | Facility: CLINIC | Age: 63
End: 2023-04-26
Payer: COMMERCIAL

## 2023-04-26 VITALS
HEART RATE: 76 BPM | DIASTOLIC BLOOD PRESSURE: 68 MMHG | SYSTOLIC BLOOD PRESSURE: 114 MMHG | HEIGHT: 64 IN | WEIGHT: 138 LBS | BODY MASS INDEX: 23.56 KG/M2

## 2023-04-26 DIAGNOSIS — I20.9 ANGINA PECTORIS: ICD-10-CM

## 2023-04-26 DIAGNOSIS — I25.10 CORONARY ARTERY DISEASE INVOLVING NATIVE CORONARY ARTERY OF NATIVE HEART WITHOUT ANGINA PECTORIS: Primary | ICD-10-CM

## 2023-04-26 DIAGNOSIS — I10 PRIMARY HYPERTENSION: ICD-10-CM

## 2023-04-26 RX ORDER — ISOSORBIDE MONONITRATE 30 MG/1
30 TABLET, EXTENDED RELEASE ORAL EVERY MORNING
Qty: 90 TABLET | Refills: 3 | Status: SHIPPED | OUTPATIENT
Start: 2023-04-26

## 2023-04-26 NOTE — PROGRESS NOTES
Date of Office Visit: 23  Encounter Provider: Dk Martin MD  Place of Service: [facility] CARDIOLOGY  Patient Name: Taty Garcia  :1960  [id number]    Chief Complaint   Patient presents with   • Coronary Artery Disease   :     HPI: Taty Garcia is a 62 y.o. female in  he had really classic angina and a stress test that was abnormal. She had a cath with a normal right and a normal circumflex but a 90 in her mid-LAD. We ended up doing an angioplasty and placed a 2.75 x28 Xience drug-eluting stent. There was a little bit of pinching of the origin of the diagonal but the flow was perfect. We did OCT this and it looked great and so we left that alone. She has had normal LV.    So she is having angina and about a month ago we did a heart cath on her and it was unchanged the stent looked great there is a little bit of origin diagonal disease there was about a 50% lesion in the RCA but it was not significant.  She still has has angina when she is going up her hills or doing things at activity and more activity outside.  She does not get it when she does her weightlifting routine inside but may not elevate her heart rate enough.  She has not had any symptoms at rest her breathing is okay no bleeding difficulty    Past Medical History:   Diagnosis Date   • Allergic     bronchitis, sinustitis   • Anxiety    • Arthritis    • Cervical strain 10/11/2018   • Cervicalgia    • Chest pain    • Coronary artery disease    • Depression    • Gastroesophageal reflux disease without esophagitis 2021   • History of coronary artery stent placement 2018   • Hyperlipidemia 3/29/2017   • Hypertension    • Insomnia    • SOB (shortness of breath)    • Tendonitis    • UTI (urinary tract infection)        Past Surgical History:   Procedure Laterality Date   • AUGMENTATION MAMMAPLASTY Bilateral     SILICONE   • BREAST BIOPSY Left     20 YERS AGO BENIGN   • BREAST SURGERY       cosmetic procedures   • CARDIAC CATHETERIZATION     • CARDIAC CATHETERIZATION     • CARDIAC CATHETERIZATION Left 2/23/2023    Procedure: Coronary angiography;  Surgeon: Dk Martin MD;  Location: [facility] CATH INVASIVE LOCATION;  Service: Cardiovascular;  Laterality: Left;   • CARDIAC CATHETERIZATION N/A 2/23/2023    Procedure: Left heart cath;  Surgeon: Dk Martin MD;  Location[facility] CATH INVASIVE LOC[facility];  Service: Cardiovascular;  Laterality: N/A;   • CARDIAC CATHETERIZATION N/A 2/23/2023    Procedure: Left ventriculography;  Surgeon: Dk Martin MD;  Location:  [facility] CATH INVASIVE LOCATION;  Service: Cardiovascular;  Laterality: N/A;   • CARDIAC CATHETERIZATION  2/23/2023    Procedure: RESTING FULL CYCLE RATIO;  Surgeon: Dk Martin MD;  [facility] C[facility];  Service: Cardiovascular;;   • COLONOSCOPY  approx 2013   • COLONOSCOPY N/A 12/20/2021    Procedure: COLONOSCOPY to cecum with cold polypectomy;  Surgeon: Cleo Bridges MD;  Location: [facility] ENDOSCOPY;  Service: Gastroenterology;  Laterality: N/A;  PRE - screening  POST - polyp, hemorrhoids   • HYSTERECTOMY     • NECK SURGERY     • SPINE SURGERY      spinal fusion of cervical region   • SUBTOTAL HYSTERECTOMY         Social History     Socioeconomic History   • Marital status:    Tobacco Use   • Smoking status: Never   • Smokeless tobacco: Never   • Tobacco comments:     CAFFEINE USE- 2 GLASSES TEA DAILY   Substance and Sexual Activity   • Alcohol use: Yes     Alcohol/week: 2.0 standard drinks     Types: 1 Glasses of wine, 1 Shots of liquor per week     Comment: OCCASSIONAL   • Drug use: No   • Sexual activity: Defer       Family History   Problem Relation Age of Onset   • Cancer Mother         breast cancer   • Arthritis Mother    • Stroke Mother    • Pancreatitis Mother    • Breast cancer Mother 70   • Heart disease Father        Review of Systems   Constitutional: Negative for decreased appetite, fever,  malaise/fatigue and weight loss.   HENT: Negative for nosebleeds.    Eyes: Negative for double vision.   Cardiovascular: Negative for chest pain, claudication, cyanosis, dyspnea on exertion, irregular heartbeat, leg swelling, near-syncope, orthopnea, palpitations, paroxysmal nocturnal dyspnea and syncope.   Respiratory: Negative for cough, hemoptysis and shortness of breath.    Hematologic/Lymphatic: Negative for bleeding problem.   Skin: Negative for rash.   Musculoskeletal: Negative for falls and myalgias.   Gastrointestinal: Negative for hematochezia, jaundice, melena, nausea and vomiting.   Genitourinary: Negative for hematuria.   Neurological: Negative for dizziness and seizures.   Psychiatric/Behavioral: Negative for altered mental status and memory loss.       Allergies   Allergen Reactions   • Lipitor [Atorvastatin] Myalgia     Other reaction(s): Myalgia   • Pravachol [Pravastatin Sodium] Myalgia   • Pravastatin Myalgia     Other reaction(s): Myalgia   • Codeine Itching   • Oxycodone-Acetaminophen Itching         Current Outpatient Medications:   •  aspirin 81 MG EC tablet, Take 1 tablet by mouth Daily., Disp: , Rfl:   •  azelastine (ASTELIN) 0.1 % nasal spray, 2 sprays into the nostril(s) as directed by provider 2 (Two) Times a Day. Use in each nostril as directed, Disp: 1 each, Rfl: 3  •  Cholecalciferol (VITAMIN D-3) 1000 UNITS capsule, Take 1,000 Units by mouth Daily., Disp: , Rfl:   •  cyclobenzaprine (FLEXERIL) 5 MG tablet, TAKE 1 TABLET BY MOUTH THREE TIMES A DAY, Disp: 90 tablet, Rfl: 0  •  hydrOXYzine (ATARAX) 25 MG tablet, TAKE 1 TABLET BY MOUTH THREE TIMES A DAY AS NEEDED FOR ANXIETY, Disp: 90 tablet, Rfl: 0  •  levothyroxine (SYNTHROID, LEVOTHROID) 25 MCG tablet, Take 1 tablet by mouth Daily., Disp: 90 tablet, Rfl: 1  •  Multiple Minerals-Vitamins (CALCIUM CITRATE +) tablet, Take 1 tablet by mouth Daily., Disp: , Rfl:   •  nebivolol (BYSTOLIC) 5 MG tablet, Take 1 tablet by mouth Daily., Disp: 90  "tablet, Rfl: 3  •  nitroglycerin (NITROSTAT) 0.4 MG SL tablet, 1 under the tongue as needed for angina, may repeat q5mins for up three doses, Disp: 45 tablet, Rfl: 11  •  omeprazole (priLOSEC) 40 MG capsule, TAKE 1 CAPSULE BY MOUTH EVERY DAY for 4 weeks, Disp: , Rfl:   •  pitavastatin calcium (LIVALO) 1 MG tablet tablet, Take 1 tablet by mouth Every Night., Disp: 30 tablet, Rfl: 11  •  Praluent 75 MG/ML solution auto-injector, INJECT 75 MG INTO THE SKIN OF THE APPROPRIATE AREA AS DIRECTED EVERY 14 DAYS, Disp: 6 mL, Rfl: 3  •  isosorbide mononitrate (IMDUR) 30 MG 24 hr tablet, Take 1 tablet by mouth Every Morning., Disp: 90 tablet, Rfl: 3      Objective:     Vitals:    04/26/23 1314   BP: 114/68   Pulse: 76   Weight: 62.6 kg (138 lb)   Height: 162.6 cm (64\")     Body mass index is 23.69 kg/m².    Constitutional:       Appearance: Well-developed.   Eyes:      General: No scleral icterus.  HENT:      Head: Normocephalic.   Neck:      Thyroid: No thyromegaly.      Vascular: No JVD.      Lymphadenopathy: No cervical adenopathy.   Pulmonary:      Effort: Pulmonary effort is normal.      Breath sounds: Normal breath sounds. No wheezing. No rales.   Cardiovascular:      Normal rate. Regular rhythm.      No gallop.   Edema:     Peripheral edema absent.   Abdominal:      Palpations: Abdomen is soft.      Tenderness: There is no abdominal tenderness.   Musculoskeletal: Normal range of motion. Skin:     General: Skin is warm and dry.      Findings: No rash.   Neurological:      Mental Status: Alert and oriented to person, place, and time.           ECG 12 Lead    Date/Time: 4/26/2023 2:07 PM  Performed by: Dk Martin MD  Authorized by: Dk Martin MD   Comparison: compared with previous ECG   Similar to previous ECG  Rhythm: sinus rhythm    Clinical impression: normal ECG             Assessment:       Diagnosis Plan   1. Coronary artery disease involving native coronary artery of native heart without angina pectoris  "       2. Angina pectoris        3. Primary hypertension               Plan:       Well she is got angina I would say is class II it is stable I am going to try her on some Imdur I do not know if it is small vessel disease or if it is from the origin of the diagonal.  If she can tolerate this be great if not we will try some Ranexa or maybe switch off the Bystolic and try metoprolol at a little bit bigger dose I will have her come back and see me in 3 months    Return in about 3 months (around 7/26/2023).     As always, it has been a pleasure to participate in your patient's care.      Sincerely,       Dk Martin MD

## 2023-05-16 ENCOUNTER — OFFICE VISIT (OUTPATIENT)
Dept: FAMILY MEDICINE CLINIC | Facility: CLINIC | Age: 63
End: 2023-05-16
Payer: COMMERCIAL

## 2023-05-16 ENCOUNTER — TELEPHONE (OUTPATIENT)
Dept: FAMILY MEDICINE CLINIC | Facility: CLINIC | Age: 63
End: 2023-05-16

## 2023-05-16 VITALS
SYSTOLIC BLOOD PRESSURE: 100 MMHG | WEIGHT: 138.4 LBS | TEMPERATURE: 97.7 F | HEIGHT: 64 IN | OXYGEN SATURATION: 99 % | DIASTOLIC BLOOD PRESSURE: 62 MMHG | BODY MASS INDEX: 23.63 KG/M2 | HEART RATE: 78 BPM

## 2023-05-16 DIAGNOSIS — I20.9 ANGINA PECTORIS: ICD-10-CM

## 2023-05-16 DIAGNOSIS — F51.01 PRIMARY INSOMNIA: ICD-10-CM

## 2023-05-16 DIAGNOSIS — I10 PRIMARY HYPERTENSION: ICD-10-CM

## 2023-05-16 DIAGNOSIS — E03.9 HYPOTHYROIDISM, UNSPECIFIED TYPE: ICD-10-CM

## 2023-05-16 DIAGNOSIS — E78.5 HYPERLIPIDEMIA, UNSPECIFIED HYPERLIPIDEMIA TYPE: Primary | ICD-10-CM

## 2023-05-16 PROBLEM — M85.89 OSTEOPENIA OF MULTIPLE SITES: Status: ACTIVE | Noted: 2023-05-16

## 2023-05-16 PROBLEM — R63.5 WEIGHT GAIN: Status: RESOLVED | Noted: 2022-11-16 | Resolved: 2023-05-16

## 2023-05-16 RX ORDER — TRAZODONE HYDROCHLORIDE 100 MG/1
100 TABLET ORAL NIGHTLY
Qty: 90 TABLET | Refills: 1 | Status: SHIPPED | OUTPATIENT
Start: 2023-05-16

## 2023-05-16 NOTE — TELEPHONE ENCOUNTER
Pt was told to come in July for a physical. No Physical or hospital follow up slots around 7/16/23 or after. First hospital follow up is 8/16. 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

## 2023-05-16 NOTE — PATIENT INSTRUCTIONS
May start trazodone 1/2 tablet nightly for insomnia  Continue hydroxyzine as prescribed    Further recommendations to follow on Synthroid dosing    Plan to recheck both lipids and LFTs in 2 months and follow-up    Continue current treatment plan.

## 2023-05-16 NOTE — PROGRESS NOTES
Chief Complaint  Hypertension (6 months check up), Hyperlipidemia, and Hypothyroidism     6-month follow-up on HTN, hyperlipidemia, new diagnosis hypothyroidism, chronic insomnia, and new diagnosis of angina    LOV with me in November for wellness exam, chronic med refills with labs, and complaints of weight gain/difficulty losing weight.  -- Screening test updated, mammogram and DEXA done and resulted.  -- Discontinue Xyzal given the addition of hydroxyzine for sleep.  Added Astelin for chronic postnasal drip  -- Start Synthroid 25 mcg every morning due to a rising TSH value (3.4) and difficulty losing weight.  She was instructed to follow-up with repeat labs in 2 to 3 months.  However this did not happen due to recent events.    Other interval history since last visit with me --she has been seen on an outpatient basis several times by her cardiologist due to episodes of recurrent chest pain.  This was worked up with a 2D echo, treadmill stress test both of which were WNL.  Continued to have episodes of angina, therefore underwent outpatient cardiac catheterization in February 2023.  Cardiac stent patent, collateral small vessel disease only.  Plan was to treat medically.  Most recent visit with cardiologist/Dr. Maritn on 4/26/2023, records reviewed.  She was started on low-dose Imdur 30 mg daily for class II angina.  Also her Praluent was changed to 5-day Livalo 1 mg daily.      Overall, doing much better.  The Imdur has really helped with her chest discomfort.  Initially had a hard time tolerating due to some headaches but this has improved.  She has been on the new statin med/Livalo x 4 weeks and tolerating without myalgias.  No further chest pain, SOA.    Today, still complains of frustrations with difficulty losing weight.  No further weight gain since starting Synthroid almost 6 months ago.  Her TSH level has not been rechecked since starting the medication due to the above events.  Admits that she has not  "been as diligent with her exercise routine over the last several months due to the angina episodes.  Only recently has she got back on track.    Also, still complains of some difficulty with sleep.  Still with problems falling asleep and staying asleep.  No excessive caffeine intake.  Has good sleep hygiene habits.    The hydroxyzine 25 mg nightly has helped tremendously but still has several nights where she cannot fall asleep.  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  She has completely weaned herself off Ambien and does not want to restart.  Otherwise, no other medications tried for insomnia.  Mood is good.  No depression.    Otherwise, may need chronic med refills.  Due for repeat lab work for certain.  On new statin x4 weeks.  Compliant with and tolerates all medications without side effects, even new statin.    Review of Systems   Constitutional: Negative for fever and unexpected weight change.   Respiratory: Negative for cough and shortness of breath.    Cardiovascular: Negative for chest pain.        Subjective          Taty Garcia presents to [facility]    Objective   Vital Signs:   Vitals:    05/16/23 1451   BP: 100/62   BP Location: Right arm   Patient Position: Sitting   Cuff Size: Adult   Pulse: 78   Temp: 97.7 °F (36.5 °C)   SpO2: 99%   Weight: 62.8 kg (138 lb 6.4 oz)   Height: 162.6 cm (64\")      Body mass index is 23.76 kg/m².   Physical Exam  Vitals and nursing note reviewed.   Constitutional:       Appearance: Normal appearance. She is well-developed.   HENT:      Head: Normocephalic and atraumatic.      Nose: Nose normal.   Eyes:      Conjunctiva/sclera: Conjunctivae normal.      Pupils: Pupils are equal, round, and reactive to light.   Neck:      Thyroid: No thyromegaly.   Cardiovascular:      Rate and Rhythm: Normal rate and regular rhythm.      Heart sounds: Normal heart sounds. No murmur heard.  Pulmonary:      Effort: Pulmonary effort " is normal.      Breath sounds: Normal breath sounds.   Abdominal:      General: Abdomen is flat. Bowel sounds are normal. There is no distension.      Palpations: Abdomen is soft. There is no hepatomegaly, splenomegaly or mass.      Tenderness: There is no abdominal tenderness. There is no guarding or rebound.      Hernia: No hernia is present.   Musculoskeletal:         General: Normal range of motion.      Cervical back: Normal range of motion and neck supple.      Right lower leg: No edema.      Left lower leg: No edema.   Lymphadenopathy:      Cervical: No cervical adenopathy.   Skin:     General: Skin is warm.   Neurological:      General: No focal deficit present.      Mental Status: She is alert.   Psychiatric:         Mood and Affect: Mood normal.         Behavior: Behavior normal.         Thought Content: Thought content normal.         Judgment: Judgment normal.        Result Review :     Common labs        6/15/2022    11:00 11/23/2022    09:47 2/20/2023    16:40   Common Labs   Glucose 91   96   127     BUN 21   15   19     Creatinine 0.81   0.90   0.90     Sodium 141   143   144     Potassium 4.6   4.8   4.3     Chloride 99   104   104     Calcium 9.8   9.9   9.8     Total Protein 7.2   6.8      Albumin 4.8   4.40      Total Bilirubin 0.3   0.3      Alkaline Phosphatase 165   155      AST (SGOT) 22   19      ALT (SGPT) 19   14      WBC  4.61   5.11     Hemoglobin  13.7   13.1     Hematocrit  41.5   38.6     Platelets  384   316     Total Cholesterol 188   181      Triglycerides 129   71      HDL Cholesterol 88   104      LDL Cholesterol  78   64        TSH        11/23/2022    09:47   TSH   TSH 3.480               Lab Results   Component Value Date    XPBR52RI 81.8 07/20/2015    FOLATE 19.9 07/20/2015      Cardiac Catheterization/Vascular Study (02/23/2023 08:19)    Office Visit with Dk Martin MD (04/26/2023)    Adult Transthoracic Echo Complete W/ Cont if Necessary Per Protocol (03/17/2023  16:37)           Assessment and Plan    Diagnoses and all orders for this visit:    1. Hyperlipidemia, unspecified hyperlipidemia type (Primary)  -controlled?  Only on new statin/ Livalo x 4 weeks.  Plan to recheck LFTs only today.  Then plan to recheck both liver function and lipids in approximately 2 months.  Goal LDL must be less than 70 given history of CAD, may need to increase dose of Livalo to 2 mg?  Continue to improve upon healthy lifestyle --Needs low-carb/low calorie/low cholesterol diet and increase cardio exercise to greater than 150 minutes weekly  -     Hepatic Function Panel  -     Comprehensive Metabolic Panel; Future  -     Lipid Panel With LDL / HDL Ratio; Future    2. Hypothyroidism, unspecified type  -new diagnosis  Recently started on Synthroid 25 mcg about 6 months ago, still has not had her TSH level rechecked.  Recheck TSH today, may possibly need to increase dose given difficulty with weight loss (yet see above HPI.)  Further recommendations to follow  -     TSH    3. Primary hypertension  -controlled  Continue Bystolic 5 mg daily    4. Primary insomnia  -uncontrolled  Suggest adding trazodone 100 mg may take 1/2 tablet nightly.  If not improved then may take 1 whole tablet nightly.  May continue hydroxyzine 25 mg nightly  Really needs to restart her regular exercise routine    5. Angina pectoris  -new diagnosis, stable  Multiple recent cardiac studies have been done and reviewed.  Discussed with patient today.  Agree with management plan per cardiologist/Dr. Martin.  Currently tolerating low-dose Imdur 30 mg daily.  Continue with aggressive risk factor control, beta-blocker, ASA, and statin treatment.    Other orders  -     traZODone (DESYREL) 100 MG tablet; Take 1 tablet by mouth Every Night.  Dispense: 90 tablet; Refill: 1      I spent 40 minutes caring for Taty on this date of service. This time includes time spent by me in the following activities:preparing for the visit, reviewing  tests, obtaining and/or reviewing a separately obtained history, performing a medically appropriate examination and/or evaluation , counseling and educating the patient/family/caregiver, ordering medications, tests, or procedures, documenting information in the medical record, independently interpreting results and communicating that information with the patient/family/caregiver, care coordination and Multiple sets of records, imaging studies and testing reviewed since last visit.  New diagnosis discussed in detail.  And management of multiple chronic controlled and uncontrolled problems.  Follow Up   Return in about 2 months (around 7/16/2023) for Annual physical, Recheck.  Patient was given instructions and counseling regarding her condition or for health maintenance advice. Please see specific information pulled into the AVS if appropriate.

## 2023-05-17 LAB
ALBUMIN SERPL-MCNC: 4.5 G/DL (ref 3.5–5.2)
ALP SERPL-CCNC: 144 U/L (ref 39–117)
ALT SERPL-CCNC: 17 U/L (ref 1–33)
AST SERPL-CCNC: 18 U/L (ref 1–32)
BILIRUB DIRECT SERPL-MCNC: <0.2 MG/DL (ref 0–0.3)
BILIRUB SERPL-MCNC: 0.2 MG/DL (ref 0–1.2)
PROT SERPL-MCNC: 6.5 G/DL (ref 6–8.5)
TSH SERPL DL<=0.005 MIU/L-ACNC: 1.91 UIU/ML (ref 0.27–4.2)

## 2023-05-26 ENCOUNTER — TELEPHONE (OUTPATIENT)
Dept: CARDIOLOGY | Facility: CLINIC | Age: 63
End: 2023-05-26
Payer: COMMERCIAL

## 2023-05-26 RX ORDER — ISOSORBIDE MONONITRATE 60 MG/1
60 TABLET, EXTENDED RELEASE ORAL EVERY MORNING
Qty: 90 TABLET | Refills: 3 | Status: SHIPPED | OUTPATIENT
Start: 2023-05-26

## 2023-06-09 RX ORDER — LEVOTHYROXINE SODIUM 0.03 MG/1
TABLET ORAL
Qty: 90 TABLET | Refills: 0 | Status: SHIPPED | OUTPATIENT
Start: 2023-06-09

## 2023-07-05 PROBLEM — M77.41 METATARSALGIA OF RIGHT FOOT: Status: ACTIVE | Noted: 2023-07-05

## 2023-07-05 PROBLEM — M19.049 HAND ARTHRITIS: Status: ACTIVE | Noted: 2023-07-05

## 2023-07-28 ENCOUNTER — OFFICE VISIT (OUTPATIENT)
Dept: CARDIOLOGY | Facility: CLINIC | Age: 63
End: 2023-07-28
Payer: COMMERCIAL

## 2023-07-28 VITALS
OXYGEN SATURATION: 98 % | DIASTOLIC BLOOD PRESSURE: 80 MMHG | WEIGHT: 139 LBS | SYSTOLIC BLOOD PRESSURE: 122 MMHG | HEART RATE: 72 BPM | BODY MASS INDEX: 23.16 KG/M2 | HEIGHT: 65 IN

## 2023-07-28 DIAGNOSIS — I10 PRIMARY HYPERTENSION: ICD-10-CM

## 2023-07-28 DIAGNOSIS — I25.10 CORONARY ARTERY DISEASE INVOLVING NATIVE CORONARY ARTERY OF NATIVE HEART WITHOUT ANGINA PECTORIS: Primary | ICD-10-CM

## 2023-07-28 DIAGNOSIS — E78.5 HYPERLIPIDEMIA, UNSPECIFIED HYPERLIPIDEMIA TYPE: ICD-10-CM

## 2023-07-28 PROCEDURE — 99214 OFFICE O/P EST MOD 30 MIN: CPT | Performed by: INTERNAL MEDICINE

## 2023-07-28 PROCEDURE — 93000 ELECTROCARDIOGRAM COMPLETE: CPT | Performed by: INTERNAL MEDICINE

## 2023-07-28 NOTE — PROGRESS NOTES
Date of Office Visit: 23  Encounter Provider: Dk Martin MD  Place of Service: [facility] CARDIOLOGY  Patient Name: Taty Garcia  :1960  [id number]    Chief Complaint   Patient presents with    Follow-up   :     HPI: Taty Garcia is a 62 y.o. female in  he had really classic angina and a stress test that was abnormal. She had a cath with a normal right and a normal circumflex but a 90 in her mid-LAD. We ended up doing an angioplasty and placed a 2.75 x28 Xience drug-eluting stent. There was a little bit of pinching of the origin of the diagonal but the flow was perfect. We did OCT this and it looked great and so we left that alone. She has had normal LV.    So she is having angina and in 2023 we did a heart cath on her and it was unchanged the stent looked great there is a little bit of origin diagonal disease there was about a 50% lesion in the RCA but it was not significant.  She continued to have some symptoms so we increased her Imdur to 60 mg a day we will that did not work out very well as she developed diarrhea.  She stopped it and all of her symptoms went away including her chest discomfort she is walking 4 miles a day without limitation and feels great she is good ready to go back to school she is a  at [employer]    Past Medical History:   Diagnosis Date    Allergic     bronchitis, sinustitis    Anxiety     Arthritis     Cervical strain 10/11/2018    Cervicalgia     Chest pain     Coronary artery disease     Depression     Gastroesophageal reflux disease without esophagitis 2021    History of coronary artery stent placement 2018    Hyperlipidemia 3/29/2017    Hypertension     Hypothyroidism 2023    Insomnia     SOB (shortness of breath)     Tendonitis     UTI (urinary tract infection)        Past Surgical History:   Procedure Laterality Date    AUGMENTATION MAMMAPLASTY Bilateral     SILICONE     BREAST BIOPSY Left     20 YERS AGO BENIGN    BREAST SURGERY      cosmetic procedures    CARDIAC CATHETERIZATION      CARDIAC CATHETERIZATION      CARDIAC CATHETERIZATION Left 02/23/2023    Procedure: Coronary angiography;  Surgeon: Dk Mratin MD;  Location: [facility] CATH INVASIVE LOCATION;  Service: Cardiovascular;  Laterality: Left;    CARDIAC CATHETERIZATION N/A 02/23/2023    Procedure: Left heart cath;  Surgeon: Dk Martin MD;  Location: [facility] CATH INVASIVE LOCATION;  Service: Cardiovascular;  Laterality: N/A;    CARDIAC CATHETERIZATION N/A 02/23/2023    Procedure: Left ventriculography;  Surgeon: Dk Martin MD;  Location: [facility] CATH INVASIVE LOCATION;  Service: Cardiovascular;  Laterality: N/A;    CARDIAC CATHETERIZATION  02/23/2023    Procedure: RESTING FULL CYCLE RATIO;  Surgeon: Dk Martin MD;  Location: [facility] CATH INVASIVE LOCATION;  Service: Cardiovascular;;    COLONOSCOPY  approx 2013    COLONOSCOPY N/A 12/20/2021    Procedure: COLONOSCOPY to cecum with cold polypectomy;  Surgeon: Cleo Bridges MD;  Location: [facility] ENDOSCOPY;  Service: Gastroenterology;  Laterality: N/A;  PRE - screening  POST - polyp, hemorrhoids    CORONARY STENT PLACEMENT      HYSTERECTOMY      NECK SURGERY      SPINE SURGERY      spinal fusion of cervical region    SUBTOTAL HYSTERECTOMY         Social History     Socioeconomic History    Marital status:    Tobacco Use    Smoking status: Never     Passive exposure: Never    Smokeless tobacco: Never    Tobacco comments:     CAFFEINE USE- 2 GLASSES TEA DAILY   Substance and Sexual Activity    Alcohol use: Yes     Alcohol/week: 2.0 standard drinks     Types: 1 Glasses of wine, 1 Shots of liquor per week     Comment: OCCASSIONAL    Drug use: No    Sexual activity: Yes     Partners: Male     Birth control/protection: Post-menopausal, None, Hysterectomy       Family History   Problem Relation Age of Onset    Cancer Mother         breast cancer     Arthritis Mother     Stroke Mother     Pancreatitis Mother     Breast cancer Mother 70    Heart disease Father     Hypertension Father        Review of Systems   Constitutional: Negative for decreased appetite, fever, malaise/fatigue and weight loss.   HENT:  Negative for nosebleeds.    Eyes:  Negative for double vision.   Cardiovascular:  Negative for chest pain, claudication, cyanosis, dyspnea on exertion, irregular heartbeat, leg swelling, near-syncope, orthopnea, palpitations, paroxysmal nocturnal dyspnea and syncope.   Respiratory:  Negative for cough, hemoptysis and shortness of breath.    Hematologic/Lymphatic: Negative for bleeding problem.   Skin:  Negative for rash.   Musculoskeletal:  Negative for falls and myalgias.   Gastrointestinal:  Negative for hematochezia, jaundice, melena, nausea and vomiting.   Genitourinary:  Negative for hematuria.   Neurological:  Negative for dizziness and seizures.   Psychiatric/Behavioral:  Negative for altered mental status and memory loss.      Allergies   Allergen Reactions    Lipitor [Atorvastatin] Myalgia     Other reaction(s): Myalgia    Pravachol [Pravastatin Sodium] Myalgia    Pravastatin Myalgia     Other reaction(s): Myalgia    Codeine Itching    Oxycodone-Acetaminophen Itching         Current Outpatient Medications:     aspirin 81 MG EC tablet, Take 1 tablet by mouth Daily., Disp: , Rfl:     azelastine (ASTELIN) 0.1 % nasal spray, 2 sprays into the nostril(s) as directed by provider 2 (Two) Times a Day. Use in each nostril as directed, Disp: 1 each, Rfl: 3    Cholecalciferol (VITAMIN D-3) 1000 UNITS capsule, Take 1,000 Units by mouth Daily., Disp: , Rfl:     cyclobenzaprine (FLEXERIL) 5 MG tablet, Take 1 tablet by mouth 3 (Three) Times a Day., Disp: 45 tablet, Rfl: 1    hydrOXYzine (ATARAX) 25 MG tablet, TAKE 1 TABLET BY MOUTH THREE TIMES A DAY AS NEEDED FOR ANXIETY, Disp: 90 tablet, Rfl: 0    isosorbide mononitrate (IMDUR) 60 MG 24 hr tablet, Take 1 tablet by  "mouth Every Morning. (Patient taking differently: Take 0.5 tablets by mouth Every Morning. Pt is taking 30mg daily), Disp: 90 tablet, Rfl: 3    levothyroxine (SYNTHROID, LEVOTHROID) 25 MCG tablet, TAKE 1 TABLET BY MOUTH EVERY DAY, Disp: 90 tablet, Rfl: 0    Multiple Minerals-Vitamins (CALCIUM CITRATE +) tablet, Take 1 tablet by mouth Daily., Disp: , Rfl:     naproxen (NAPROSYN) 500 MG tablet, Take 1 tablet by mouth 2 (Two) Times a Day With Meals., Disp: 60 tablet, Rfl: 3    nebivolol (BYSTOLIC) 5 MG tablet, Take 1 tablet by mouth Daily., Disp: 90 tablet, Rfl: 3    nitroglycerin (NITROSTAT) 0.4 MG SL tablet, 1 under the tongue as needed for angina, may repeat q5mins for up three doses, Disp: 45 tablet, Rfl: 11    omeprazole (priLOSEC) 40 MG capsule, TAKE 1 CAPSULE BY MOUTH EVERY DAY for 4 weeks, Disp: , Rfl:     pitavastatin calcium (LIVALO) 1 MG tablet tablet, Take 1 tablet by mouth Every Night., Disp: 30 tablet, Rfl: 11    traZODone (DESYREL) 100 MG tablet, Take 1 tablet by mouth Every Night., Disp: 90 tablet, Rfl: 1      Objective:     Vitals:    07/28/23 1434   BP: 122/80   BP Location: Left arm   Patient Position: Sitting   Cuff Size: Adult   Pulse: 72   SpO2: 98%   Weight: 63 kg (139 lb)   Height: 165.1 cm (65\")     Body mass index is 23.13 kg/m².    Constitutional:       Appearance: Well-developed.   Eyes:      General: No scleral icterus.  HENT:      Head: Normocephalic.   Neck:      Thyroid: No thyromegaly.      Vascular: No JVD.      Lymphadenopathy: No cervical adenopathy.   Pulmonary:      Effort: Pulmonary effort is normal.      Breath sounds: Normal breath sounds. No wheezing. No rales.   Cardiovascular:      Normal rate. Regular rhythm.      No gallop.    Edema:     Peripheral edema absent.   Abdominal:      Palpations: Abdomen is soft.      Tenderness: There is no abdominal tenderness.   Musculoskeletal: Normal range of motion. Skin:     General: Skin is warm and dry.      Findings: No rash. "   Neurological:      Mental Status: Alert and oriented to person, place, and time.         ECG 12 Lead    Date/Time: 7/28/2023 3:06 PM  Performed by: Dk Martin MD  Authorized by: Dk Martin MD   Comparison: compared with previous ECG   Similar to previous ECG  Rhythm: sinus rhythm    Clinical impression: normal ECG         Assessment:       Diagnosis Plan   1. Coronary artery disease involving native coronary artery of native heart without angina pectoris        2. Primary hypertension        3. Hyperlipidemia, unspecified hyperlipidemia type               Plan:       She is doing great is having class I angina symptoms I actually told her to maybe stop the Imdur and see if it just made any difference at all we catheter and everything looked good so I was at a little bit of a loss why she was having her symptoms but I am glad they have resolved I will have her come back and see me in a year sooner if she has trouble  No follow-ups on file.     As always, it has been a pleasure to participate in your patient's care.      Sincerely,       Dk Martin MD

## 2023-09-18 RX ORDER — LEVOTHYROXINE SODIUM 0.03 MG/1
TABLET ORAL
Qty: 90 TABLET | Refills: 0 | Status: SHIPPED | OUTPATIENT
Start: 2023-09-18

## 2023-10-13 ENCOUNTER — OFFICE VISIT (OUTPATIENT)
Dept: FAMILY MEDICINE CLINIC | Facility: CLINIC | Age: 63
End: 2023-10-13
Payer: COMMERCIAL

## 2023-10-13 VITALS
SYSTOLIC BLOOD PRESSURE: 116 MMHG | DIASTOLIC BLOOD PRESSURE: 64 MMHG | HEIGHT: 65 IN | OXYGEN SATURATION: 99 % | HEART RATE: 77 BPM | WEIGHT: 136 LBS | TEMPERATURE: 98 F | BODY MASS INDEX: 22.66 KG/M2

## 2023-10-13 DIAGNOSIS — R05.1 ACUTE COUGH: ICD-10-CM

## 2023-10-13 DIAGNOSIS — J10.1 INFLUENZA A: ICD-10-CM
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
## 2023-10-13 DIAGNOSIS — R07.81 RIB PAIN: ICD-10-CM

## 2023-10-13 LAB
EXPIRATION DATE: ABNORMAL
EXPIRATION DATE: NORMAL
FLUAV RNA RESP QL NAA+PROBE: DETECTED
FLUBV RNA RESP QL NAA+PROBE: NOT DETECTED
INTERNAL CONTROL: ABNORMAL
INTERNAL CONTROL: NORMAL
Lab: ABNORMAL
Lab: NORMAL
SARS-COV-2 AG UPPER RESP QL IA.RAPID: NOT DETECTED

## 2023-10-13 RX ORDER — BENZONATATE 100 MG/1
100 CAPSULE ORAL 3 TIMES DAILY PRN
Qty: 21 CAPSULE | Refills: 0 | Status: SHIPPED | OUTPATIENT
Start: 2023-10-13

## 2023-10-13 RX ORDER — ISOSORBIDE MONONITRATE 30 MG/1
30 TABLET, EXTENDED RELEASE ORAL DAILY
COMMUNITY
Start: 2023-07-22

## 2023-10-13 RX ORDER — OSELTAMIVIR PHOSPHATE 75 MG/1
75 CAPSULE ORAL 2 TIMES DAILY
Qty: 10 CAPSULE | Refills: 0 | Status: SHIPPED | OUTPATIENT
Start: 2023-10-13 | End: 2023-10-18

## 2023-11-01 NOTE — TELEPHONE ENCOUNTER
Caller: GOLDEN    Relationship: SELF    Best call back number: [phone]    Requested Prescriptions:   Requested Prescriptions     Pending Prescriptions Disp Refills    pitavastatin calcium (LIVALO) 1 MG tablet tablet 30 tablet 11     Sig: Take 1 tablet by mouth Every Night.        Pharmacy where request should be sent: [id number] [place], [place] - [address] [phone] [facility] [phone]      Last office visit with prescribing clinician: 7/28/2023   Last telemedicine visit with prescribing clinician: Visit date not found   Next office visit with prescribing clinician: 7/31/2024     Additional details provided by patient: PT STATED SHE HAS TRIED MULTIPLE TIMES TO GET THIS FILLED.  SHE HAS BEEN OUT FOR A WEEK AND THE PHARMACY SAYS THEY STILL DO NOT HAVE TH PRESCRIPTION.     Does the patient have less than a 3 day supply:  [x] Yes  [] No    Would you like a call back once the refill request has been completed: [] Yes [] No    If the office needs to give you a call back, can they leave a voicemail: [] Yes [] No    Mariano Parson Rep   11/01/23 13:07 EDT

## 2023-11-09 RX ORDER — TRAZODONE HYDROCHLORIDE 100 MG/1
100 TABLET ORAL
Qty: 90 TABLET | Refills: 0 | Status: SHIPPED | OUTPATIENT
Start: 2023-11-09

## 2023-12-18 RX ORDER — LEVOTHYROXINE SODIUM 0.03 MG/1
TABLET ORAL
Qty: 90 TABLET | Refills: 0 | Status: SHIPPED | OUTPATIENT
Start: 2023-12-18

## 2024-01-16 ENCOUNTER — TELEPHONE (OUTPATIENT)
Dept: FAMILY MEDICINE CLINIC | Facility: CLINIC | Age: 64
End: 2024-01-16

## 2024-01-16 DIAGNOSIS — Z12.31 ENCOUNTER FOR SCREENING MAMMOGRAM FOR BREAST CANCER: Primary | ICD-10-CM

## 2024-02-04 DIAGNOSIS — M77.41 METATARSALGIA OF RIGHT FOOT: ICD-10-CM

## 2024-02-04 DIAGNOSIS — M19.049 HAND ARTHRITIS: ICD-10-CM

## 2024-02-05 RX ORDER — TRAZODONE HYDROCHLORIDE 100 MG/1
100 TABLET ORAL
Qty: 90 TABLET | Refills: 0 | Status: SHIPPED | OUTPATIENT
Start: 2024-02-05

## 2024-02-05 RX ORDER — NAPROXEN 500 MG/1
500 TABLET ORAL 2 TIMES DAILY WITH MEALS
Qty: 60 TABLET | Refills: 0 | Status: SHIPPED | OUTPATIENT
Start: 2024-02-05

## 2024-02-14 ENCOUNTER — OFFICE VISIT (OUTPATIENT)
Dept: FAMILY MEDICINE CLINIC | Facility: CLINIC | Age: 64
End: 2024-02-14
Payer: COMMERCIAL

## 2024-02-14 VITALS
DIASTOLIC BLOOD PRESSURE: 76 MMHG | BODY MASS INDEX: 22.39 KG/M2 | TEMPERATURE: 97.5 F | WEIGHT: 134.4 LBS | HEART RATE: 62 BPM | SYSTOLIC BLOOD PRESSURE: 120 MMHG | HEIGHT: 65 IN | OXYGEN SATURATION: 98 %

## 2024-02-14 DIAGNOSIS — E03.9 HYPOTHYROIDISM, UNSPECIFIED TYPE: ICD-10-CM

## 2024-02-14 DIAGNOSIS — G89.29 CHRONIC PAIN OF RIGHT KNEE: ICD-10-CM

## 2024-02-14 DIAGNOSIS — I25.10 CAD IN NATIVE ARTERY: ICD-10-CM

## 2024-02-14 DIAGNOSIS — E78.5 HYPERLIPIDEMIA, UNSPECIFIED HYPERLIPIDEMIA TYPE: ICD-10-CM

## 2024-02-14 DIAGNOSIS — M25.561 CHRONIC PAIN OF RIGHT KNEE: ICD-10-CM

## 2024-02-14 DIAGNOSIS — F51.01 PRIMARY INSOMNIA: ICD-10-CM

## 2024-02-14 DIAGNOSIS — Z23 NEED FOR VACCINATION: ICD-10-CM

## 2024-02-14 DIAGNOSIS — Z00.00 WELLNESS EXAMINATION: Primary | ICD-10-CM

## 2024-02-14 DIAGNOSIS — I10 PRIMARY HYPERTENSION: ICD-10-CM

## 2024-02-14 PROBLEM — K63.5 HYPERPLASTIC COLONIC POLYP: Status: ACTIVE | Noted: 2024-02-14

## 2024-02-14 RX ORDER — PITAVASTATIN 1 MG/1
1 TABLET, FILM COATED ORAL NIGHTLY
Qty: 90 TABLET | Refills: 3 | Status: CANCELLED | OUTPATIENT
Start: 2024-02-14

## 2024-02-15 LAB
ALBUMIN SERPL-MCNC: 4.5 G/DL (ref 3.5–5.2)
ALBUMIN/GLOB SERPL: 2.3 G/DL
ALP SERPL-CCNC: 142 U/L (ref 39–117)
ALT SERPL-CCNC: 16 U/L (ref 1–33)
AST SERPL-CCNC: 17 U/L (ref 1–32)
BASOPHILS # BLD AUTO: 0.06 10*3/MM3 (ref 0–0.2)
BASOPHILS NFR BLD AUTO: 1 % (ref 0–1.5)
BILIRUB SERPL-MCNC: 0.3 MG/DL (ref 0–1.2)
BUN SERPL-MCNC: 13 MG/DL (ref 8–23)
BUN/CREAT SERPL: 14.3 (ref 7–25)
CALCIUM SERPL-MCNC: 9.4 MG/DL (ref 8.6–10.5)
CHLORIDE SERPL-SCNC: 106 MMOL/L (ref 98–107)
CHOLEST SERPL-MCNC: 195 MG/DL (ref 0–200)
CO2 SERPL-SCNC: 27.6 MMOL/L (ref 22–29)
CREAT SERPL-MCNC: 0.91 MG/DL (ref 0.57–1)
EGFRCR SERPLBLD CKD-EPI 2021: 71.9 ML/MIN/1.73
EOSINOPHIL # BLD AUTO: 0.13 10*3/MM3 (ref 0–0.4)
EOSINOPHIL NFR BLD AUTO: 2.1 % (ref 0.3–6.2)
ERYTHROCYTE [DISTWIDTH] IN BLOOD BY AUTOMATED COUNT: 12.1 % (ref 12.3–15.4)
GLOBULIN SER CALC-MCNC: 2 GM/DL
GLUCOSE SERPL-MCNC: 98 MG/DL (ref 65–99)
HCT VFR BLD AUTO: 40.4 % (ref 34–46.6)
HDLC SERPL-MCNC: 101 MG/DL (ref 40–60)
HGB BLD-MCNC: 13.2 G/DL (ref 12–15.9)
IMM GRANULOCYTES # BLD AUTO: 0.01 10*3/MM3 (ref 0–0.05)
IMM GRANULOCYTES NFR BLD AUTO: 0.2 % (ref 0–0.5)
LDLC SERPL CALC-MCNC: 80 MG/DL (ref 0–100)
LDLC/HDLC SERPL: 0.78 {RATIO}
LYMPHOCYTES # BLD AUTO: 1.63 10*3/MM3 (ref 0.7–3.1)
LYMPHOCYTES NFR BLD AUTO: 26.7 % (ref 19.6–45.3)
MCH RBC QN AUTO: 29.9 PG (ref 26.6–33)
MCHC RBC AUTO-ENTMCNC: 32.7 G/DL (ref 31.5–35.7)
MCV RBC AUTO: 91.6 FL (ref 79–97)
MONOCYTES # BLD AUTO: 0.57 10*3/MM3 (ref 0.1–0.9)
MONOCYTES NFR BLD AUTO: 9.3 % (ref 5–12)
NEUTROPHILS # BLD AUTO: 3.7 10*3/MM3 (ref 1.7–7)
NEUTROPHILS NFR BLD AUTO: 60.7 % (ref 42.7–76)
NRBC BLD AUTO-RTO: 0 /100 WBC (ref 0–0.2)
PLATELET # BLD AUTO: 269 10*3/MM3 (ref 140–450)
POTASSIUM SERPL-SCNC: 4.7 MMOL/L (ref 3.5–5.2)
PROT SERPL-MCNC: 6.5 G/DL (ref 6–8.5)
RBC # BLD AUTO: 4.41 10*6/MM3 (ref 3.77–5.28)
SODIUM SERPL-SCNC: 145 MMOL/L (ref 136–145)
TRIGL SERPL-MCNC: 78 MG/DL (ref 0–150)
TSH SERPL DL<=0.005 MIU/L-ACNC: 1.65 UIU/ML (ref 0.27–4.2)
VLDLC SERPL CALC-MCNC: 14 MG/DL (ref 5–40)
WBC # BLD AUTO: 6.1 10*3/MM3 (ref 3.4–10.8)

## 2024-03-04 ENCOUNTER — TELEPHONE (OUTPATIENT)
Dept: FAMILY MEDICINE CLINIC | Facility: CLINIC | Age: 64
End: 2024-03-04
Payer: COMMERCIAL

## 2024-03-04 DIAGNOSIS — M25.561 CHRONIC PAIN OF RIGHT KNEE: Primary | ICD-10-CM

## 2024-03-04 DIAGNOSIS — G89.29 CHRONIC PAIN OF RIGHT KNEE: Primary | ICD-10-CM

## 2024-03-15 RX ORDER — NEBIVOLOL 5 MG/1
5 TABLET ORAL DAILY
Qty: 90 TABLET | Refills: 3 | Status: SHIPPED | OUTPATIENT
Start: 2024-03-15

## 2024-03-15 RX ORDER — LEVOTHYROXINE SODIUM 0.03 MG/1
TABLET ORAL
Qty: 90 TABLET | Refills: 0 | Status: SHIPPED | OUTPATIENT
Start: 2024-03-15

## 2024-03-24 DIAGNOSIS — M77.41 METATARSALGIA OF RIGHT FOOT: ICD-10-CM

## 2024-03-24 DIAGNOSIS — M19.049 HAND ARTHRITIS: ICD-10-CM

## 2024-03-25 RX ORDER — NAPROXEN 500 MG/1
500 TABLET ORAL 2 TIMES DAILY WITH MEALS
Qty: 60 TABLET | Refills: 0 | Status: SHIPPED | OUTPATIENT
Start: 2024-03-25

## 2024-04-26 ENCOUNTER — TELEPHONE (OUTPATIENT)
Dept: FAMILY MEDICINE CLINIC | Facility: CLINIC | Age: 64
End: 2024-04-26

## 2024-04-26 NOTE — TELEPHONE ENCOUNTER
"  Caller: Taty Garcia \"Sara\"    Relationship: Self    Best call back number: [phone]     What is the best time to reach you: ANY    Who are you requesting to speak with (clinical staff, provider,  specific staff member): CLINICAL STAFF    Do you know the name of the person who called:      What was the call regarding: PATIENT CALLED STATED SHE WAS SEEN FOR RIGHT KNEE PAIN AND STILL HAVING RIGHT KNEE PAIN.  SHE HAS BEEN PHYSICAL THERAPY TWICE AND DONE THE EXERCISES.  IT IS NOT HELPING BEEN WEEK 1/2 STILL HURTS REALLY BAD.  PATIENT WANTS TO KNOW WHAT IS HER NEXT STEPS AND SHOULD SHE BE REFERRED TO SPECIALIST.  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      Is it okay if the provider responds through MyChart:         "

## 2024-04-29 RX ORDER — TRAZODONE HYDROCHLORIDE 100 MG/1
100 TABLET ORAL NIGHTLY
Qty: 90 TABLET | Refills: 0 | Status: SHIPPED | OUTPATIENT
Start: 2024-04-29

## 2024-05-03 ENCOUNTER — OFFICE VISIT (OUTPATIENT)
Dept: FAMILY MEDICINE CLINIC | Facility: CLINIC | Age: 64
End: 2024-05-03
Payer: COMMERCIAL

## 2024-05-03 VITALS
HEIGHT: 65 IN | WEIGHT: 136 LBS | HEART RATE: 61 BPM | OXYGEN SATURATION: 100 % | DIASTOLIC BLOOD PRESSURE: 72 MMHG | SYSTOLIC BLOOD PRESSURE: 124 MMHG | TEMPERATURE: 97.8 F | BODY MASS INDEX: 22.66 KG/M2

## 2024-05-03 DIAGNOSIS — G89.29 CHRONIC PAIN OF RIGHT KNEE: Primary | ICD-10-CM

## 2024-05-03 DIAGNOSIS — M25.561 CHRONIC PAIN OF RIGHT KNEE: Primary | ICD-10-CM

## 2024-05-03 RX ORDER — TRIAMCINOLONE ACETONIDE 40 MG/ML
80 INJECTION, SUSPENSION INTRA-ARTICULAR; INTRAMUSCULAR ONCE
Status: COMPLETED | OUTPATIENT
Start: 2024-05-03 | End: 2024-05-03

## 2024-05-03 RX ADMIN — TRIAMCINOLONE ACETONIDE 80 MG: 40 INJECTION, SUSPENSION INTRA-ARTICULAR; INTRAMUSCULAR at 15:53

## 2024-05-03 NOTE — PROGRESS NOTES
"Chief Complaint  Knee Pain (Right  knee for about 6 months  has pain and discomfort hard to sleep at night with this pain did PT but it did not help at all all the opposite )    Subjective        Taty Garcia presents to [facility]  History of Present Illness  Pleasant 63-year-old female here for 6 months of right knee pain, initial symptoms started gradually and has gradually worsened since then, non event or trauma.   She was treated with physical therapy and naproxen without significant improvement.  She is now having issues walking, located medially, aching at night and wakes her from sleep.  Or when she is sitting and trying to stand up.  And now she is limping.     She started physical therapy but seems to have actually made it worse not better.    Objective   Vital Signs:  /72   Pulse 61   Temp 97.8 °F (36.6 °C)   Ht 165.1 cm (65\")   Wt 61.7 kg (136 lb)   SpO2 100%   BMI 22.63 kg/m²   Estimated body mass index is 22.63 kg/m² as calculated from the following:    Height as of this encounter: 165.1 cm (65\").    Weight as of this encounter: 61.7 kg (136 lb).       BMI is within normal parameters. No other follow-up for BMI required.      Physical Exam  Vitals and nursing note reviewed.   Constitutional:       General: She is not in acute distress.     Appearance: She is well-developed.   HENT:      Head: Normocephalic.      Nose: Nose normal.   Eyes:      General: No scleral icterus.  Pulmonary:      Effort: Pulmonary effort is normal. No respiratory distress.   Musculoskeletal:         General: Normal range of motion.      Comments: Relatively normal exam of the knee, no pain on palpation no laxity, no swelling   Skin:     General: Skin is warm and dry.      Findings: No rash.   Neurological:      Mental Status: She is alert and oriented to person, place, and time.   Psychiatric:         Behavior: Behavior normal.         Thought Content: Thought content normal.    " "     Judgment: Judgment normal.        Result Review :    The following data was reviewed by: Aubree Kline MD on 05/03/2024:        A R knee X-Ray 3 view with sunrise view was ordered. My reading of this film is mild to moderate narrowing of the medial joint spaces, mild osteophytes especially behind the patella-no acute signs of fracture or dislocation.  Mild to moderate osteoarthritis. (No comparison films available: pending review by Radiologist.)   Arthrocentesis    Date/Time: 5/3/2024 6:01 PM    Performed by: Aubree Kline MD  Authorized by: Aubree Kline MD  Consent: Verbal consent obtained.  Risks and benefits: risks, benefits and alternatives were discussed  Consent given by: patient  Patient understanding: patient states understanding of the procedure being performed  Site marked: the operative site was marked  Patient identity confirmed: verbally with patient  Time out: Immediately prior to procedure a \"time out\" was called to verify the correct patient, procedure, equipment, support staff and site/side marked as required.  Indications: pain   Body area: knee  Joint: right knee  Preparation: Patient was prepped and draped in the usual sterile fashion.  Needle gauge: 25G, 1 1/2 inch.  Approach: lateral  Patient tolerance: Patient tolerated the procedure well with no immediate complications            Assessment and Plan     Diagnoses and all orders for this visit:    1. Chronic pain of right knee (Primary)  -     XR Knee 3+ View With [place] Right  -     triamcinolone acetonide (KENALOG-40) injection 80 mg  -     Arthrocentesis    Pleasant 63-year-old female here for 6 months of right knee pain, not well-controlled, symptoms and x-ray consistent with osteoarthritis.  She is planning to have a vacation in [place] in 2 weeks and feels that her current treatments are not effective and have actually worsened since starting physical therapy.  We discussed the possibility of doing arthrocentesis which " she was very grateful to do today.  We discussed risk benefits of bleeding infection which she was in agreement to do.  Arthrocentesis performed today without complication.  We discussed trying to use this treatment modality as infrequently as possible.  She does know that there is a slight risk of tendon weakness with repetitive injections.    She is a patient of my partners, I am not sure if Dr. Vidal also does joint injections she may do so with her PCP in the future or if needed with me.         Follow Up     No follow-ups on file.  Patient was given instructions and counseling regarding her condition or for health maintenance advice. Please see specific information pulled into the AVS if appropriate.

## 2024-05-05 DIAGNOSIS — M19.049 HAND ARTHRITIS: ICD-10-CM

## 2024-05-05 DIAGNOSIS — M77.41 METATARSALGIA OF RIGHT FOOT: ICD-10-CM

## 2024-05-06 RX ORDER — NAPROXEN 500 MG/1
500 TABLET ORAL 2 TIMES DAILY WITH MEALS
Qty: 60 TABLET | Refills: 0 | Status: SHIPPED | OUTPATIENT
Start: 2024-05-06

## 2024-05-07 ENCOUNTER — TELEPHONE (OUTPATIENT)
Dept: FAMILY MEDICINE CLINIC | Facility: CLINIC | Age: 64
End: 2024-05-07
Payer: COMMERCIAL

## 2024-05-21 ENCOUNTER — TELEPHONE (OUTPATIENT)
Dept: FAMILY MEDICINE CLINIC | Facility: CLINIC | Age: 64
End: 2024-05-21
Payer: COMMERCIAL

## 2024-05-21 NOTE — TELEPHONE ENCOUNTER
"  Caller: Taty Garcia \"Sara\"    Relationship: Self    Best call back number:     Taty Garcia \"Sara\" (Self) [phone] (Mobile)     What was the call regarding: PATIENT IS WANTING A CALLBACK FROM DR GARCIA AND ASK ABOUT HER LABS AND WHAT NEEDS TO BE DONE TO GET BETTER NUMBERS    SHE ALSO WANTED TO SEE IF SHE CAN PLAY SPORTS OR NOT?         Is it okay if the provider responds through MyChart: CALL PLEASE     "

## 2024-05-28 ENCOUNTER — OFFICE VISIT (OUTPATIENT)
Dept: FAMILY MEDICINE CLINIC | Facility: CLINIC | Age: 64
End: 2024-05-28
Payer: COMMERCIAL

## 2024-05-28 VITALS
HEART RATE: 74 BPM | OXYGEN SATURATION: 99 % | TEMPERATURE: 98.2 F | BODY MASS INDEX: 22.76 KG/M2 | WEIGHT: 136.6 LBS | DIASTOLIC BLOOD PRESSURE: 62 MMHG | SYSTOLIC BLOOD PRESSURE: 120 MMHG | HEIGHT: 65 IN

## 2024-05-28 DIAGNOSIS — R25.2 LEG CRAMPS: ICD-10-CM

## 2024-05-28 DIAGNOSIS — I10 PRIMARY HYPERTENSION: ICD-10-CM

## 2024-05-28 DIAGNOSIS — E03.9 HYPOTHYROIDISM, UNSPECIFIED TYPE: ICD-10-CM

## 2024-05-28 DIAGNOSIS — M17.11 PRIMARY OSTEOARTHRITIS OF RIGHT KNEE: ICD-10-CM

## 2024-05-28 DIAGNOSIS — I20.9 ANGINA PECTORIS: ICD-10-CM

## 2024-05-28 DIAGNOSIS — I25.10 CAD IN NATIVE ARTERY: ICD-10-CM

## 2024-05-28 DIAGNOSIS — E78.5 HYPERLIPIDEMIA, UNSPECIFIED HYPERLIPIDEMIA TYPE: Primary | ICD-10-CM

## 2024-05-28 DIAGNOSIS — F51.01 PRIMARY INSOMNIA: ICD-10-CM

## 2024-05-28 PROCEDURE — 99215 OFFICE O/P EST HI 40 MIN: CPT | Performed by: FAMILY MEDICINE

## 2024-05-28 RX ORDER — ISOSORBIDE MONONITRATE 30 MG/1
30 TABLET, EXTENDED RELEASE ORAL DAILY
Qty: 90 TABLET | Refills: 1 | Status: SHIPPED | OUTPATIENT
Start: 2024-05-28

## 2024-05-28 RX ORDER — PITAVASTATIN CALCIUM 2.09 MG/1
2 TABLET, FILM COATED ORAL NIGHTLY
Qty: 90 TABLET | Refills: 1 | Status: SHIPPED | OUTPATIENT
Start: 2024-05-28

## 2024-05-28 NOTE — PROGRESS NOTES
"Chief Complaint  Hyperlipidemia (3 months check up follow up labs, question about subtle lab abnormalities that she sees on \"MyChart.\"  Also never got directions from last visit without increasing live able.), Hypothyroidism, Hypertension, leg cramps, Knee Pain (Right knee pain follow up), and Coronary Artery Disease (Question about \"medicine from cardiologist?\")    3-month follow-up on hyperlipidemia, chronic right knee pain  And  6-month follow-up on HTN, hypothyroidism, chronic insomnia, and has several other questions and concerns to discuss    Last visit with me in February for wellness exam and chronic med refills with labs  --All screening studies updated, WNL  -- All routine labs WNL except for persistent elevation with LDL.  See below progress notes and recommendations.  -- Treated acute on chronic right knee pain, likely diagnosis of OA exacerbation.  Started on naproxen x 10 days and as needed.  Told if not better would need to follow-up for additional imaging and treatment.    Progress Notes  Makenzie Vidal MD (Physician)  Family Medicine  All labs look good.  However LDL still not at goal.  Ideally goal LDL needs to be less than 70 given diagnosis of CAD.  Do recommend increasing Liavlo from 1 mg to 2 mg daily.  Need to recheck LFTs and lipids in 3 months, come to appointment fasting.  May place lab order prior if she desires.        Apparently, she never received the \"MyChart message\" regarding her lab results after last visit 3 months ago until a few days ago when she figured out \"how to do MyChart.\"  Thus, never increased the dose of Livalo.    Other interval history since last seen by me --- continue to have problems with her right knee, the scheduled an acute appointment here at the office to be seen.  Seeing Dr. Kline about 3 to 4 weeks ago, records reviewed and summarized.  Office Visit with Aubree Kline MD (05/03/2024) --Dx right knee OA.  X-rays completed, official result with moderate " "medial compartment arthritis.  Status post arthrocentesis done, beneficial.    Overall, doing much better!  Recently returned from vacation to [facility].  Last day of school today, out for summer vacation/.    Continues to work hard to maintain a low-cholesterol and healthy heart diet.  Regular/daily exercise.  Weight stable..  Mood is good.  Adequate sleep, since the addition of trazodone.  No reported chest pain, SOA, or edema.  No headaches.     Today, has several other \"questions and concerns.\"  #1) concerned about \"the MyChart\" getting lab results excetra.    #2) would like to know if she can start back to playing pickle ball and tennis?  Concerned about her right knee and history of arthritis.  Currently doing well with as needed naproxen.  Maintains an active lifestyle.  Known OA of multiple joints.    #3) concerned about some subtle lab abnormalities that she saw on MyChart i.e. such as slightly elevated bun and slightly elevated alk phos.    #4) complains of intermittent leg cramps.  Would like to know what she can do.?  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  Usually improved with OTC magnesium and staying well-hydrated.    #5) question about her history of \"angina and the medicine Dr. Martin gave her?\"  Currently taking Imdur 30 mg daily every day, although ran out 2 weeks ago.  No chest pain, SOA, or anginal equivalent at this time.    She does feel as if the Imdur 30 mg daily helps, no longer with anginal-like symptoms.  Yet, the 60 mg dose created GI side effects.    Although she is confused because Dr. Martin was \"not convinced that she had angina based on her last cath?\"    Last office note with cardiologist/Dr. Martin from 1 year ago reviewed.  She does have follow-up scheduled in about 6 weeks.  Office Visit with Dk Martin MD (07/28/2023) --follow-up from recent cath done in February " "2023.  Stent looked good, 50% lesion in her RCA but nonsignificant.  Plan was to stop the Imdur, given she was having no symptoms even off the Imdur??  Follow-up in 1 year     Otherwise, needs chronic med refills.  Had repeat labs done last week, here for review.  Compliant with and tolerates all medications without side effects, including statin/Liavlo (never tried on higher doses.)  Failed other statins in past, never tried Zetia in past.    Review of Systems   Constitutional:  Negative for fever and unexpected weight change.   Respiratory:  Negative for cough and shortness of breath.    Cardiovascular:  Negative for chest pain.        Subjective          Taty Garcia presents to [facility]    Objective   Vital Signs:   Vitals:    05/28/24 1549   BP: 120/62   BP Location: Left arm   Patient Position: Sitting   Cuff Size: Adult   Pulse: 74   Temp: 98.2 °F (36.8 °C)   SpO2: 99%   Weight: 62 kg (136 lb 9.6 oz)   Height: 165.1 cm (65\")      Body mass index is 22.73 kg/m².   Physical Exam  Vitals and nursing note reviewed.   Constitutional:       Appearance: Normal appearance. She is well-developed.   HENT:      Head: Normocephalic and atraumatic.      Nose: Nose normal.   Eyes:      Conjunctiva/sclera: Conjunctivae normal.      Pupils: Pupils are equal, round, and reactive to light.   Neck:      Thyroid: No thyromegaly.   Cardiovascular:      Rate and Rhythm: Normal rate and regular rhythm.      Heart sounds: Normal heart sounds. No murmur heard.  Pulmonary:      Effort: Pulmonary effort is normal. No respiratory distress.      Breath sounds: Normal breath sounds. No wheezing or rales.   Abdominal:      General: Abdomen is flat. Bowel sounds are normal. There is no distension.      Palpations: Abdomen is soft. There is no hepatomegaly, splenomegaly or mass.      Tenderness: There is no abdominal tenderness. There is no guarding or rebound.      Hernia: No hernia is present. "   Musculoskeletal:         General: Normal range of motion.      Cervical back: Normal range of motion and neck supple.      Right lower leg: No edema.      Left lower leg: No edema.      Comments: Right knee --no joint effusion, normal range of motion, negative Braxton's    Bilateral hands --Heberden's nodes only in DIP joints bilaterally, no joint asymmetry, no joint swelling   Lymphadenopathy:      Cervical: No cervical adenopathy.   Skin:     General: Skin is warm.   Neurological:      General: No focal deficit present.      Mental Status: She is alert.   Psychiatric:         Mood and Affect: Mood normal.         Behavior: Behavior normal.         Thought Content: Thought content normal.         Judgment: Judgment normal.        Result Review :     Common labs          7/5/2023    11:28 2/14/2024    10:25 5/3/2024    09:36   Common Labs   Glucose 97  98  93    BUN 14  13  27    Creatinine 0.91  0.91  1.00    Sodium 142  145  143    Potassium 4.6  4.7  4.2    Chloride 103  106  106    Calcium 10.3  9.4  9.5    Total Protein 7.4  6.5  6.6    Albumin 4.9  4.5  4.6    Total Bilirubin 0.3  0.3  0.4    Alkaline Phosphatase 147  142  135    AST (SGOT) 19  17  20    ALT (SGPT) 17  16  16    WBC  6.10     Hemoglobin  13.2     Hematocrit  40.4     Platelets  269     Total Cholesterol 197  195  205    Triglycerides 92  78  62    HDL Cholesterol 90  101  102    LDL Cholesterol  91  80  92      Lipid Panel          7/5/2023    11:28 2/14/2024    10:25 5/3/2024    09:36   Lipid Panel   Total Cholesterol 197  195  205    Triglycerides 92  78  62    HDL Cholesterol 90  101  102    VLDL Cholesterol 16  14  11    LDL Cholesterol  91  80  92    LDL/HDL Ratio 0.98  0.78  0.89      TSH          2/14/2024    10:25   TSH   TSH 1.650            Lab Results   Component Value Date    ZRVY89DP 81.8 07/20/2015    FOLATE 19.9 07/20/2015                   Assessment and Plan    Diagnoses and all orders for this visit:    1. Hyperlipidemia,  unspecified hyperlipidemia type (Primary) --slightly uncontrolled  Given history of CAD, goal LDL ideally needs to be less than 55 (at least less than 70.)  Thus, needs increase Livalo to 2 mg daily.  Risk and benefits discussed.  If she develops increased leg cramps, increased joint or muscle aches then to call back and we will lower the dose to 1 mg and add Zetia?  Otherwise, continue with healthy lifestyle --Needs low-carb/low calorie/low cholesterol diet and increase cardio exercise to greater than 150 minutes weekly   Recheck lipids, CMP in 3 months  -     Comprehensive Metabolic Panel; Future  -     Lipid Panel With LDL / HDL Ratio; Future    2. CAD in native artery --controlled  Status post stent in 2014.  Status post repeat cath in February 2023, stent looked good, minimal nonobstructive CAD in RCA.  Continue with aggressive risk factor control --ASA, statin, beta-blocker.  -     Lipid Panel With LDL / HDL Ratio; Future    3. Angina pectoris --controlled  History of angina in 2023.  Status post repeat cath done no significant findings.  Started on Imdur by cardiologist/Dr. Martin.  Initially had some side effects, but now tolerating Imdur 30 mg daily and angina free.  Reviewed cardiology note with patient today from almost 1 year ago, July 2023 (a little discrepancy whether she was to continue Imdur not?)  Currently she is taking Imdur 30 mg, tolerating, and no angina.  I have asked her to continue that dosage until she sees Dr. Martin this summer.    4. Leg cramps --fair control  Electrolytes look good.  Check magnesium level.  Stay well-hydrated.  Possibly component of Livalo, need to watch closely as the dose is being increased.  -     Magnesium; Future    5. Primary hypertension --controlled, no change with Bystolic    6. Hypothyroidism, unspecified type --controlled  TSH up-to-date and therapeutic, no change with Synthroid    7. Primary insomnia --controlled, doing well with the addition of trazodone  100 mg nightly    8. Primary osteoarthritis of right knee -controlled  Moderate medial compartment OA.  X-ray results discussed.  Natural history discussed.    Continue with conservative treatment at this time, naproxen as needed.  Arthrocentesis as needed  Recommend regular cardio exercise, continue with healthy lifestyle.  Play tennis and pickleball as she wishes.    Elevated alkaline phosphatase --very mild.  Asymptomatic.  Reassurance given.  Significant OA history.    Elevated BUN --very mild.  Likely due to some dehydration at time of lab draw.    Encounter for [place] stuff --- stressed the importance that she must hear back and clearly understand the directions after each lab draw, test ordered excetra.  If not then she is to call back for further clarification and directions.  See above HPI.        Other orders  -     pitavastatin calcium (LIVALO) 2 MG tablet tablet; Take 1 tablet by mouth Every Night.  Dispense: 90 tablet; Refill: 1  -     isosorbide mononitrate (IMDUR) 30 MG 24 hr tablet; Take 1 tablet by mouth Daily.  Dispense: 90 tablet; Refill: 1      I spent 45 minutes caring for Taty on this date of service. This time includes time spent by me in the following activities:preparing for the visit, reviewing tests, obtaining and/or reviewing a separately obtained history, performing a medically appropriate examination and/or evaluation , counseling and educating the patient/family/caregiver, ordering medications, tests, or procedures, documenting information in the medical record, independently interpreting results and communicating that information with the patient/family/caregiver, care coordination, and extensive education, counseling, and management of multiple new and chronic uncontrolled diagnoses.  Numerous questions and concerns addressed.  Follow Up   Return in about 3 months (around 8/28/2024) for , Labs first and appointment.  Patient was given instructions and counseling regarding her  condition or for health maintenance advice. Please see specific information pulled into the AVS if appropriate.

## 2024-05-28 NOTE — PATIENT INSTRUCTIONS
Increase cholesterol med to 2 mg daily, watch for increased arthralgias or myalgias    Stay well-hydrated    Recommend low fat/low calorie diet and exercise greater than 150 minutes of cardio per week.      Continue current treatment plan.

## 2024-06-07 RX ORDER — LEVOTHYROXINE SODIUM 0.03 MG/1
TABLET ORAL
Qty: 90 TABLET | Refills: 0 | Status: SHIPPED | OUTPATIENT
Start: 2024-06-07

## 2024-06-17 ENCOUNTER — TELEPHONE (OUTPATIENT)
Dept: FAMILY MEDICINE CLINIC | Facility: CLINIC | Age: 64
End: 2024-06-17
Payer: COMMERCIAL

## 2024-06-17 RX ORDER — SCOLOPAMINE TRANSDERMAL SYSTEM 1 MG/1
1 PATCH, EXTENDED RELEASE TRANSDERMAL
Qty: 5 EACH | Refills: 0 | Status: SHIPPED | OUTPATIENT
Start: 2024-06-17

## 2024-06-17 NOTE — TELEPHONE ENCOUNTER
Rx Refill Note  Requested Prescriptions     Pending Prescriptions Disp Refills    Scopolamine 1 MG/3DAYS patch 5 each 0     Sig: Place 1 patch on the skin as directed by provider Every 72 (Seventy-Two) Hours.      Last office visit with prescribing clinician: 5/28/2024   Last telemedicine visit with prescribing clinician: Visit date not found   Next office visit with prescribing clinician: 7/29/2024                         Would you like a call back once the refill request has been completed: [] Yes [] No    If the office needs to give you a call back, can they leave a voicemail: [] Yes [] No    Pawel Dunbar CMA/LMR  06/17/24, 13:20 EDT

## 2024-06-17 NOTE — TELEPHONE ENCOUNTER
"    Caller: Taty Garcia \"Sara\"    Relationship: Self    Best call back number:     What medication are you requesting: MOTION SICKNESS PATCH    What are your current symptoms:     How long have you been experiencing symptoms:     Have you had these symptoms before:    [] Yes  [x] No    Have you been treated for these symptoms before:   [] Yes  [x] No    If a prescription is needed, what is your preferred pharmacy and phone number:  [facility] [address]  [phone]    Additional notes:  PATIENT SAYS SHE WILL BE GOING ON AN [place] ON 06/18/24 AND WANTS TO KNOW IF DR GARCIA WILL PRESCRIBE HER THE MOTION SICKNESS PATCH.         "

## 2024-06-28 ENCOUNTER — TELEPHONE (OUTPATIENT)
Dept: FAMILY MEDICINE CLINIC | Facility: CLINIC | Age: 64
End: 2024-06-28

## 2024-06-28 NOTE — TELEPHONE ENCOUNTER
Patient was last seen on 5-28-24 and told to return in 3 months (around 8-28-24) along with labs prior to appt. So, patient may keep her appt in Sept or we can move her appt earlier. And she will need a lab appt as well.

## 2024-06-28 NOTE — TELEPHONE ENCOUNTER
"  Caller: Taty Garcia \"Sara\"    Relationship: Self    Best call back number: [phone]     What was the call regarding: PATIENT HAS RESCHEDULED HER APPOINTMENT FROM 07/29/24 TO 09/11/2024 AT 2:45PM WITH DR. GARCIA. PATIENT IS CHECKING WITH DR. GARCIA TO MAKE SURE THAT IS OK. IF DR GARCIA WANTS TO SEE HER SOONER PLEASE CALL PATIENT TO RESCHEDULE.      "

## 2024-07-14 DIAGNOSIS — M77.41 METATARSALGIA OF RIGHT FOOT: ICD-10-CM

## 2024-07-14 DIAGNOSIS — M19.049 HAND ARTHRITIS: ICD-10-CM

## 2024-07-15 RX ORDER — NAPROXEN 500 MG/1
500 TABLET ORAL 2 TIMES DAILY WITH MEALS
Qty: 60 TABLET | Refills: 0 | Status: SHIPPED | OUTPATIENT
Start: 2024-07-15

## 2024-07-31 ENCOUNTER — OFFICE VISIT (OUTPATIENT)
Dept: CARDIOLOGY | Facility: CLINIC | Age: 64
End: 2024-07-31
Payer: COMMERCIAL

## 2024-07-31 VITALS
SYSTOLIC BLOOD PRESSURE: 120 MMHG | DIASTOLIC BLOOD PRESSURE: 80 MMHG | HEIGHT: 65 IN | WEIGHT: 137.2 LBS | HEART RATE: 68 BPM | BODY MASS INDEX: 22.86 KG/M2

## 2024-07-31 DIAGNOSIS — E78.5 HYPERLIPIDEMIA, UNSPECIFIED HYPERLIPIDEMIA TYPE: ICD-10-CM

## 2024-07-31 DIAGNOSIS — Z78.9 STATIN INTOLERANCE: Primary | ICD-10-CM

## 2024-07-31 DIAGNOSIS — I10 PRIMARY HYPERTENSION: ICD-10-CM

## 2024-07-31 DIAGNOSIS — I25.10 CORONARY ARTERY DISEASE INVOLVING NATIVE CORONARY ARTERY OF NATIVE HEART WITHOUT ANGINA PECTORIS: ICD-10-CM

## 2024-07-31 NOTE — PROGRESS NOTES
Date of Office Visit: 24  Encounter Provider: Dk Martin MD  Place of Service: [facility] CARDIOLOGY  Patient Name: Taty Garcia  :1960  [id number]    Chief Complaint   Patient presents with   • Coronary Artery Disease   :     HPI: Taty Garcia is a 63 y.o. female in  he had really classic angina and a stress test that was abnormal. She had a cath with a normal right and a normal circumflex but a 90 in her mid-LAD. We ended up doing an angioplasty and placed a 2.75 x28 Xience drug-eluting stent. There was a little bit of pinching of the origin of the diagonal but the flow was perfect. We did OCT this and it looked great and so we left that alone. She has had normal LV.    So she is having angina and in 2023 we did a heart cath on her and it was unchanged the stent looked great there is a little bit of origin diagonal disease there was about a 50% lesion in the RCA but it was not significant.  She has been intolerant of several statins because of myalgias we have her on wilfredo statin and recently was increased from 1 mg to 2 mg in May of this year because her LDL was 92.  She is here for follow-up today and is doing well no chest pain shortness of breath PND orthopnea edema she has what she describes as sounds like Raynaud's occasionally in her hands it resolves after about 20 minutes.  Her blood pressures been really great.  Her  says she snores but when he is talk to her it sounds like light snoring no obstruction and she feels good in the morning when she wakes up      Past Medical History:   Diagnosis Date   • Allergic     bronchitis, sinustitis   • Anxiety    • Arthritis    • Cervical strain 10/11/2018   • Cervicalgia    • Chest pain    • Coronary artery disease    • Depression    • Gastroesophageal reflux disease without esophagitis 2021   • History of coronary artery stent placement 2018   • Hyperlipidemia 3/29/2017   •  Hyperplastic colonic polyp 2/14/2024   • Hypertension    • Hypothyroidism 5/16/2023   • Insomnia    • SOB (shortness of breath)    • Tendonitis    • UTI (urinary tract infection)        Past Surgical History:   Procedure Laterality Date   • AUGMENTATION MAMMAPLASTY Bilateral 2008    SILICONE   • BREAST BIOPSY Left     20 YERS AGO BENIGN   • BREAST SURGERY      cosmetic procedures   • CARDIAC CATHETERIZATION     • CARDIAC CATHETERIZATION     • CARDIAC CATHETERIZATION Left 02/23/2023    Procedure: Coronary angiography;  Surgeon: Dk Martin MD;  Location:  [facility] CATH INVASIVE LOCATION;  Service: Cardiovascular;  Laterality: Left;   • CARDIAC CATHETERIZATION N/A 02/23/2023    Procedure: Left heart cath;  Surgeon: Dk Martin MD;  Location:  [facility] CATH INVASIVE LOCATION;  Service: Cardiovascular;  Laterality: N/A;   • CARDIAC CATHETERIZATION N/A 02/23/2023    Procedure: Left ventriculography;  Surgeon: Dk Martin MD;  Location:  [facility]ATH [facility]VASIVE [facility]ATION;  Service: Cardiovascular;  Laterality: N/A;   • CARDIAC CATHETERIZATION  02/23/2023    Procedure: RESTING FULL CYCLE RATIO;  Surgeon: Dk Martin MD;  Location:  [facility] CATH INVASIVE LOCATION;  Service: Cardiovascular;;   • COLONOSCOPY  approx 2013   • COLONOSCOPY N/A 12/20/2021    Procedure: COLONOSCOPY to cecum with cold polypectomy;  Surgeon: Cleo Bridges MD;  Location: [facility] ENDOSCOPY;  Service: Gastroenterology;  Laterality: N/A;  PRE - screening  POST - polyp, hemorrhoids   • CORONARY STENT PLACEMENT     • HYSTERECTOMY     • NECK SURGERY     • SPINE SURGERY      spinal fusion of cervical region   • SUBTOTAL HYSTERECTOMY         Social History     Socioeconomic History   • Marital status:    Tobacco Use   • Smoking status: Never     Passive exposure: Never   • Smokeless tobacco: Never   • Tobacco comments:     CAFFEINE USE- 2 GLASSES TEA DAILY   Vaping Use   • Vaping status: Never Used   Substance and Sexual Activity   •  Alcohol use: Yes     Alcohol/week: 2.0 standard drinks of alcohol     Types: 1 Glasses of wine, 1 Shots of liquor per week     Comment: OCCASSIONAL   • Drug use: No   • Sexual activity: Yes     Partners: Male     Birth control/protection: Post-menopausal, None, Hysterectomy       Family History   Problem Relation Age of Onset   • Cancer Mother         breast cancer   • Arthritis Mother    • Stroke Mother    • Pancreatitis Mother    • Breast cancer Mother 70   • Heart disease Father    • Hypertension Father        Review of Systems   Constitutional: Negative for decreased appetite, fever, malaise/fatigue and weight loss.   HENT:  Negative for nosebleeds.    Eyes:  Negative for double vision.   Cardiovascular:  Negative for chest pain, claudication, cyanosis, dyspnea on exertion, irregular heartbeat, leg swelling, near-syncope, orthopnea, palpitations, paroxysmal nocturnal dyspnea and syncope.   Respiratory:  Negative for cough, hemoptysis and shortness of breath.    Hematologic/Lymphatic: Negative for bleeding problem.   Skin:  Negative for rash.   Musculoskeletal:  Negative for falls and myalgias.   Gastrointestinal:  Negative for hematochezia, jaundice, melena, nausea and vomiting.   Genitourinary:  Negative for hematuria.   Neurological:  Negative for dizziness and seizures.   Psychiatric/Behavioral:  Negative for altered mental status and memory loss.        Allergies   Allergen Reactions   • Lipitor [Atorvastatin] Myalgia     Other reaction(s): Myalgia   • Pravachol [Pravastatin Sodium] Myalgia   • Pravastatin Myalgia     Other reaction(s): Myalgia   • Codeine Itching   • Oxycodone-Acetaminophen Itching         Current Outpatient Medications:   •  aspirin 81 MG EC tablet, Take 1 tablet by mouth Daily., Disp: , Rfl:   •  Cholecalciferol (VITAMIN D-3) 1000 UNITS capsule, Take 1,000 Units by mouth Daily., Disp: , Rfl:   •  isosorbide mononitrate (IMDUR) 30 MG 24 hr tablet, Take 1 tablet by mouth Daily., Disp: 90  "tablet, Rfl: 1  •  levothyroxine (SYNTHROID, LEVOTHROID) 25 MCG tablet, TAKE 1 TABLET BY MOUTH EVERY DAY, Disp: 90 tablet, Rfl: 0  •  Multiple Minerals-Vitamins (CALCIUM CITRATE +) tablet, Take 1 tablet by mouth Daily., Disp: , Rfl:   •  naproxen (NAPROSYN) 500 MG tablet, TAKE 1 TABLET BY MOUTH 2 TIMES A DAY WITH MEALS, Disp: 60 tablet, Rfl: 0  •  nebivolol (BYSTOLIC) 5 MG tablet, TAKE 1 TABLET BY MOUTH EVERY DAY, Disp: 90 tablet, Rfl: 3  •  nitroglycerin (NITROSTAT) 0.4 MG SL tablet, 1 under the tongue as needed for angina, may repeat q5mins for up three doses, Disp: 45 tablet, Rfl: 11  •  omeprazole (priLOSEC) 40 MG capsule, TAKE 1 CAPSULE BY MOUTH EVERY DAY for 4 weeks, Disp: , Rfl:   •  pitavastatin calcium (LIVALO) 2 MG tablet tablet, Take 1 tablet by mouth Every Night., Disp: 90 tablet, Rfl: 1  •  traZODone (DESYREL) 100 MG tablet, TAKE 1 TABLET BY MOUTH NIGHTLY, Disp: 90 tablet, Rfl: 0  •  Scopolamine 1 MG/3DAYS patch, Place 1 patch on the skin as directed by provider Every 72 (Seventy-Two) Hours. (Patient not taking: Reported on 7/31/2024), Disp: 5 each, Rfl: 0      Objective:     Vitals:    07/31/24 1022   BP: 120/80   Pulse: 68   Weight: 62.2 kg (137 lb 3.2 oz)   Height: 165.1 cm (65\")     Body mass index is 22.83 kg/m².    Constitutional:       Appearance: Well-developed.   Eyes:      General: No scleral icterus.  HENT:      Head: Normocephalic.   Neck:      Thyroid: No thyromegaly.      Vascular: No JVD.      Lymphadenopathy: No cervical adenopathy.   Pulmonary:      Effort: Pulmonary effort is normal.      Breath sounds: Normal breath sounds. No wheezing. No rales.   Cardiovascular:      Normal rate. Regular rhythm.      No gallop.    Edema:     Peripheral edema absent.   Abdominal:      Palpations: Abdomen is soft.      Tenderness: There is no abdominal tenderness.   Musculoskeletal: Normal range of motion. Skin:     General: Skin is warm and dry.      Findings: No rash.   Neurological:      Mental " Status: Alert and oriented to person, place, and time.         ECG 12 Lead    Date/Time: 7/31/2024 10:53 AM  Performed by: Dk Martin MD    Authorized by: Dk Martin MD  Comparison: compared with previous ECG   Similar to previous ECG  Rhythm: sinus rhythm    Clinical impression: normal ECG         Assessment:       Diagnosis Plan   1. Statin intolerance        2. Coronary artery disease involving native coronary artery of native heart without angina pectoris        3. Primary hypertension        4. Hyperlipidemia, unspecified hyperlipidemia type               Plan:       I think she is doing pretty well she does not seem to be having much in the way of symptoms I have been always curious why this happened to this lady and I think we should get an apolipoprotein B and a LP(a) on her and see what that shows the recommendation is to try and get her LDL under 55 will elvis check her lipids today also and then either increase the wilfredo statin add Zetia or may be try and get her on a PSK 9 inhibitor if we can we will see what we find with her lipids I will see her back in a year sooner if she has problems I do think the Raynaud's could be from her beta-blocker but she is not that bothered by it wants to stay on it    As always, it has been a pleasure to participate in your patient's care.      Sincerely,       Dk Martin MD

## 2024-08-01 ENCOUNTER — LAB (OUTPATIENT)
Dept: LAB | Facility: HOSPITAL | Age: 64
End: 2024-08-01
Payer: COMMERCIAL

## 2024-08-01 DIAGNOSIS — E78.5 HYPERLIPIDEMIA, UNSPECIFIED HYPERLIPIDEMIA TYPE: ICD-10-CM

## 2024-08-01 DIAGNOSIS — I25.10 CAD IN NATIVE ARTERY: ICD-10-CM

## 2024-08-01 DIAGNOSIS — I25.10 CORONARY ARTERY DISEASE INVOLVING NATIVE CORONARY ARTERY OF NATIVE HEART WITHOUT ANGINA PECTORIS: ICD-10-CM

## 2024-08-01 DIAGNOSIS — R25.2 LEG CRAMPS: ICD-10-CM

## 2024-08-01 LAB
ALBUMIN SERPL-MCNC: 4.4 G/DL (ref 3.5–5.2)
ALBUMIN/GLOB SERPL: 1.7 G/DL
ALP SERPL-CCNC: 139 U/L (ref 39–117)
ALT SERPL W P-5'-P-CCNC: 24 U/L (ref 1–33)
ANION GAP SERPL CALCULATED.3IONS-SCNC: 7 MMOL/L (ref 5–15)
AST SERPL-CCNC: 24 U/L (ref 1–32)
BILIRUB SERPL-MCNC: 0.3 MG/DL (ref 0–1.2)
BUN SERPL-MCNC: 19 MG/DL (ref 8–23)
BUN/CREAT SERPL: 20.7 (ref 7–25)
CALCIUM SPEC-SCNC: 9.6 MG/DL (ref 8.6–10.5)
CHLORIDE SERPL-SCNC: 104 MMOL/L (ref 98–107)
CHOLEST SERPL-MCNC: 206 MG/DL (ref 0–200)
CHOLEST SERPL-MCNC: 213 MG/DL (ref 0–200)
CO2 SERPL-SCNC: 28 MMOL/L (ref 22–29)
CREAT SERPL-MCNC: 0.92 MG/DL (ref 0.57–1)
CRP SERPL-MCNC: 0.08 MG/DL (ref 0.01–0.5)
EGFRCR SERPLBLD CKD-EPI 2021: 71 ML/MIN/1.73
GLOBULIN UR ELPH-MCNC: 2.6 GM/DL
GLUCOSE SERPL-MCNC: 97 MG/DL (ref 65–99)
HDLC SERPL-MCNC: 118 MG/DL (ref 40–60)
HDLC SERPL-MCNC: 122 MG/DL (ref 40–60)
LDLC SERPL CALC-MCNC: 75 MG/DL (ref 0–100)
LDLC SERPL CALC-MCNC: 77 MG/DL (ref 0–100)
LDLC/HDLC SERPL: 0.62 {RATIO}
LDLC/HDLC SERPL: 0.62 {RATIO}
MAGNESIUM SERPL-MCNC: 2.3 MG/DL (ref 1.6–2.4)
POTASSIUM SERPL-SCNC: 4.3 MMOL/L (ref 3.5–5.2)
PROT SERPL-MCNC: 7 G/DL (ref 6–8.5)
SODIUM SERPL-SCNC: 139 MMOL/L (ref 136–145)
TRIGL SERPL-MCNC: 75 MG/DL (ref 0–150)
TRIGL SERPL-MCNC: 79 MG/DL (ref 0–150)
VLDLC SERPL-MCNC: 13 MG/DL (ref 5–40)
VLDLC SERPL-MCNC: 14 MG/DL (ref 5–40)

## 2024-08-01 PROCEDURE — 86141 C-REACTIVE PROTEIN HS: CPT

## 2024-08-01 PROCEDURE — 80053 COMPREHEN METABOLIC PANEL: CPT

## 2024-08-01 PROCEDURE — 83735 ASSAY OF MAGNESIUM: CPT

## 2024-08-01 PROCEDURE — 80061 LIPID PANEL: CPT

## 2024-08-01 PROCEDURE — 36415 COLL VENOUS BLD VENIPUNCTURE: CPT | Performed by: INTERNAL MEDICINE

## 2024-08-01 PROCEDURE — 82172 ASSAY OF APOLIPOPROTEIN: CPT | Performed by: INTERNAL MEDICINE

## 2024-08-01 PROCEDURE — 83695 ASSAY OF LIPOPROTEIN(A): CPT

## 2024-08-02 ENCOUNTER — TELEPHONE (OUTPATIENT)
Dept: CARDIOLOGY | Facility: CLINIC | Age: 64
End: 2024-08-02
Payer: COMMERCIAL

## 2024-08-02 LAB — LPA SERPL-SCNC: <8.4 NMOL/L

## 2024-08-02 RX ORDER — PITAVASTATIN CALCIUM 4.18 MG/1
4 TABLET, FILM COATED ORAL NIGHTLY
Qty: 90 TABLET | Refills: 3 | Status: SHIPPED | OUTPATIENT
Start: 2024-08-02

## 2024-08-04 LAB — APO B SERPL-MCNC: 74 MG/DL

## 2024-08-05 RX ORDER — TRAZODONE HYDROCHLORIDE 100 MG/1
100 TABLET ORAL NIGHTLY
Qty: 90 TABLET | Refills: 0 | Status: SHIPPED | OUTPATIENT
Start: 2024-08-05

## 2024-08-06 DIAGNOSIS — Z98.61 CAD S/P PERCUTANEOUS CORONARY ANGIOPLASTY: Primary | ICD-10-CM

## 2024-08-06 DIAGNOSIS — I25.10 CAD S/P PERCUTANEOUS CORONARY ANGIOPLASTY: Primary | ICD-10-CM

## 2024-08-11 NOTE — PROGRESS NOTES
Please call the patient regarding her abnormal result.  Please tell them to make an appt to discuss    Need to further discuss.  Only about 2 months on higher dose of statin?  Has improved, but still not at goal.  Follow-up to further discuss

## 2024-08-27 ENCOUNTER — OFFICE VISIT (OUTPATIENT)
Dept: FAMILY MEDICINE CLINIC | Facility: CLINIC | Age: 64
End: 2024-08-27
Payer: COMMERCIAL

## 2024-08-27 VITALS
WEIGHT: 140 LBS | BODY MASS INDEX: 23.32 KG/M2 | OXYGEN SATURATION: 97 % | HEART RATE: 62 BPM | TEMPERATURE: 97.4 F | DIASTOLIC BLOOD PRESSURE: 80 MMHG | HEIGHT: 65 IN | RESPIRATION RATE: 16 BRPM | SYSTOLIC BLOOD PRESSURE: 126 MMHG

## 2024-08-27 DIAGNOSIS — M17.11 PRIMARY OSTEOARTHRITIS OF RIGHT KNEE: Primary | ICD-10-CM

## 2024-08-27 PROCEDURE — 99213 OFFICE O/P EST LOW 20 MIN: CPT | Performed by: FAMILY MEDICINE

## 2024-08-27 PROCEDURE — 20610 DRAIN/INJ JOINT/BURSA W/O US: CPT | Performed by: FAMILY MEDICINE

## 2024-08-27 RX ORDER — TRIAMCINOLONE ACETONIDE 40 MG/ML
80 INJECTION, SUSPENSION INTRA-ARTICULAR; INTRAMUSCULAR ONCE
Status: COMPLETED | OUTPATIENT
Start: 2024-08-27 | End: 2024-08-27

## 2024-08-27 RX ADMIN — TRIAMCINOLONE ACETONIDE 40 MG: 40 INJECTION, SUSPENSION INTRA-ARTICULAR; INTRAMUSCULAR at 17:13

## 2024-08-27 NOTE — PROGRESS NOTES
"Chief Complaint  Knee Pain (Right knee pain would like injection. )    Subjective        Taty Garcia presents to [facility] PRIMARY [facility]ARE  History of Present Illness  Pleasant 64-year-old female here to follow-up for right knee pain, this has been a chronic problem, osteoarthritis seen on x-ray, she was given a knee injection when May with significant improvement.  Her symptoms improved for 3 months, started to come back on August 3 and have been progressively worsening since then.  She works as a  and feels that it affects her work significantly.      Objective   Vital Signs:  /80   Pulse 62   Temp 97.4 °F (36.3 °C)   Resp 16   Ht 165.1 cm (65\")   Wt 63.5 kg (140 lb)   SpO2 97%   BMI 23.30 kg/m²   Estimated body mass index is 23.3 kg/m² as calculated from the following:    Height as of this encounter: 165.1 cm (65\").    Weight as of this encounter: 63.5 kg (140 lb).    BMI is within normal parameters. No other follow-up for BMI required.      Physical Exam  Vitals and nursing note reviewed.   Constitutional:       General: She is not in acute distress.     Appearance: She is well-developed.   HENT:      Head: Normocephalic.      Nose: Nose normal.   Eyes:      General: No scleral icterus.  Pulmonary:      Effort: Pulmonary effort is normal. No respiratory distress.   Musculoskeletal:         General: Normal range of motion.      Comments: Normal-appearing knee except for some mild swelling on the right side.  No tenderness to touch.   Skin:     General: Skin is warm and dry.      Findings: No rash.   Neurological:      Mental Status: She is alert and oriented to person, place, and time.   Psychiatric:         Behavior: Behavior normal.         Thought Content: Thought content normal.         Judgment: Judgment normal.        Result Review :         Arthrocentesis    Date/Time: 8/27/2024 4:29 PM    Performed by: Aubree Kline MD  Authorized by: Aubree Kline " "MD JAMES  Consent: Verbal consent obtained.  Risks and benefits: risks, benefits and alternatives were discussed  Consent given by: patient  Patient understanding: patient states understanding of the procedure being performed  Site marked: the operative site was marked  Patient identity confirmed: verbally with patient  Time out: Immediately prior to procedure a \"time out\" was called to verify the correct patient, procedure, equipment, support staff and site/side marked as required.  Indications: pain   Body area: knee  Joint: right knee  Preparation: Patient was prepped and draped in the usual sterile fashion.  Needle gauge: 25G, 1 1/2 inch.  Approach: lateral  Patient tolerance: Patient tolerated the procedure well with no immediate complications            Assessment and Plan   Diagnoses and all orders for this visit:    1. Primary osteoarthritis of right knee (Primary)  -     Arthrocentesis  -     triamcinolone acetonide (KENALOG-40) injection 80 mg    Pleasant 64-year-old female here to follow-up for chronic knee pain with osteoarthritis, she has had substantial improvement with injections, her first visit in May.  It has been over 3 months now and requesting a second injection.  We discussed continuing physical therapy.  Continue with naproxen as well.  She does have a PPI on board.  Once injections with triamcinolone are no longer beneficial okay to transition to hyaluronic acid/PRP with Ortho.  Hopefully this injection will last longer, soonest date for next injection will be 3 months         Follow Up   Return in about 3 months (around 11/27/2024), or if symptoms worsen or fail to improve, for Recheck knee OA .  Patient was given instructions and counseling regarding her condition or for health maintenance advice. Please see specific information pulled into the AVS if appropriate.             "

## 2024-09-01 DIAGNOSIS — M19.049 HAND ARTHRITIS: ICD-10-CM

## 2024-09-01 DIAGNOSIS — M77.41 METATARSALGIA OF RIGHT FOOT: ICD-10-CM

## 2024-09-03 RX ORDER — NAPROXEN 500 MG/1
500 TABLET ORAL 2 TIMES DAILY WITH MEALS
Qty: 60 TABLET | Refills: 0 | Status: SHIPPED | OUTPATIENT
Start: 2024-09-03

## 2024-09-05 DIAGNOSIS — I25.10 CORONARY ARTERY DISEASE INVOLVING NATIVE CORONARY ARTERY OF NATIVE HEART WITHOUT ANGINA PECTORIS: Primary | ICD-10-CM

## 2024-09-05 RX ORDER — PITAVASTATIN CALCIUM 2.09 MG/1
2 TABLET, FILM COATED ORAL NIGHTLY
Qty: 90 TABLET | Refills: 3 | Status: SHIPPED | OUTPATIENT
Start: 2024-09-05

## 2024-09-05 RX ORDER — EZETIMIBE 10 MG/1
10 TABLET ORAL DAILY
Qty: 90 TABLET | Refills: 3 | Status: SHIPPED | OUTPATIENT
Start: 2024-09-05

## 2024-09-11 ENCOUNTER — OFFICE VISIT (OUTPATIENT)
Dept: FAMILY MEDICINE CLINIC | Facility: CLINIC | Age: 64
End: 2024-09-11
Payer: COMMERCIAL

## 2024-09-11 VITALS
HEART RATE: 66 BPM | BODY MASS INDEX: 23.22 KG/M2 | WEIGHT: 139.4 LBS | OXYGEN SATURATION: 99 % | SYSTOLIC BLOOD PRESSURE: 118 MMHG | DIASTOLIC BLOOD PRESSURE: 70 MMHG | HEIGHT: 65 IN | TEMPERATURE: 97.7 F

## 2024-09-11 DIAGNOSIS — F51.01 PRIMARY INSOMNIA: ICD-10-CM

## 2024-09-11 DIAGNOSIS — E03.9 HYPOTHYROIDISM, UNSPECIFIED TYPE: ICD-10-CM

## 2024-09-11 DIAGNOSIS — I25.10 CAD IN NATIVE ARTERY: ICD-10-CM

## 2024-09-11 DIAGNOSIS — E78.5 HYPERLIPIDEMIA, UNSPECIFIED HYPERLIPIDEMIA TYPE: Primary | ICD-10-CM

## 2024-09-11 DIAGNOSIS — M17.11 PRIMARY OSTEOARTHRITIS OF RIGHT KNEE: ICD-10-CM

## 2024-09-11 DIAGNOSIS — I10 PRIMARY HYPERTENSION: ICD-10-CM

## 2024-09-11 DIAGNOSIS — Z78.9 MEDICALLY COMPLEX PATIENT: ICD-10-CM

## 2024-09-11 PROCEDURE — 99214 OFFICE O/P EST MOD 30 MIN: CPT | Performed by: FAMILY MEDICINE

## 2024-09-11 RX ORDER — LEVOTHYROXINE SODIUM 25 UG/1
25 TABLET ORAL DAILY
Qty: 90 TABLET | Refills: 1 | Status: SHIPPED | OUTPATIENT
Start: 2024-09-11

## 2024-09-11 RX ORDER — DICLOFENAC SODIUM 30 MG/G
GEL TOPICAL 2 TIMES DAILY
Qty: 60 G | Refills: 3 | Status: SHIPPED | OUTPATIENT
Start: 2024-09-11

## 2024-09-11 NOTE — PROGRESS NOTES
Chief Complaint  Hyperlipidemia (Follow up labs), Hypothyroidism, Insomnia, Coronary Artery Disease, Hypertension, and Osteoarthritis (Cortisone shot given by Dr Kline has not helped)    3 to 4-month follow-up on hyperlipidemia, CAD, angina history, leg cramps, and right knee OA  And  6-month follow-up on HTN, hypothyroidism, and insomnia    Last visit with me in May 2024 for uncontrolled lipids in the setting of CAD, questions about her anginal history, and leg cramps.  -- Liavlo increase to 2 mg daily due to persistent LDL greater than 55 in the setting of CAD.  -- Check magnesium level due to leg cramps, WNL  -- Long discussion regarding her CAD diagnosis, anginal diagnosis.  Instructed to stay on Imdur as this has been beneficial for her angina, until seen and evaluated by her cardiologist.    Other interval history since last seen by me --she has seen her cardiologist/Dr. Martin and recently seen here few months ago by Dr. Kline for knee injection.  Records reviewed, see below    Seen by cardiologist/Dr. Martin about 6 WEEKS AGO:  Office Visit with Dk Martin MD (07/31/2024) --- routine follow-up on CAD, angina, hyperlipidemia, statin intolerance.  Additional lipid studies checked (lipoprotein a, apolipoprotein B, WNL.) recommended her increasing her Liavlo to 4 mg given CAD history and goal LDL to be less than 55.  Did discuss possible increase myalgias, consider adding Zetia?  Or starting a PSK 9 inhibitor?    Numerous patient messages and telephone messages sent back-and-forth between herself and cardiologist.  Started having increased leg pains after increasing the dose of her Liavlo, therefore went back to 2 mg daily.  And Zetia 10 mg daily was added (this was just added last week.) therefore, plans were pushed back to recheck labs (now planning to repeat labs in January 2025.)    ALSO, RECENTLY SEEN HERE ABOUT 2 WEEKS AGO for right knee pain/OA.  Seen Dr. Kline, requested a repeat cortisone  "injection.  Office Visit with Aubree Kline MD (08/27/2024) --S/P Kenalog injection given 80 mg    Today, continues to do well overall.  However, not much improvement with her right knee since receiving the cortisone injection 2 weeks ago.  She is disappointed because the first injection she received about 3 months ago in May 2024 really did help.  Yet, no improvement this time.  Describes most of her right knee pain being anterior.  No joint swelling.  Remains on naproxen 500 mg twice a day.    Has only been on new medication/Zetia x 1 week.  Thus far, tolerating.  Continues to maintain a very healthy lifestyle (both a healthy diet/low-cholesterol and regular cardio exercise.)  Weight remains stable.  Still working full-time, teacher at [employer].  Mood is good.  Sleep has been adequate.  No chest pain, SOA, or edema.    Otherwise, no other new complaints.  Needs some chronic med refills.  Uncertain about labs?  Compliant with and tolerates current medications without side effects, except for statins and increased dose of Liavlo.  Note, previously treated with Repatha in the distant past (uncertain why this was stopped, insurance?)          Review of Systems   Constitutional:  Negative for fever and unexpected weight change.   Respiratory:  Negative for cough and shortness of breath.    Cardiovascular:  Negative for chest pain.   Musculoskeletal:         Knee pain        Subjective          Taty Garcia presents to [facility]    Objective   Vital Signs:   Vitals:    09/11/24 1452   BP: 118/70   BP Location: Right arm   Patient Position: Sitting   Cuff Size: Adult   Pulse: 66   Temp: 97.7 °F (36.5 °C)   SpO2: 99%   Weight: 63.2 kg (139 lb 6.4 oz)   Height: 165.1 cm (65\")      Body mass index is 23.2 kg/m².   Physical Exam  Vitals and nursing note reviewed.   Constitutional:       Appearance: Normal appearance. She is well-developed.   HENT:      Head: Normocephalic and " atraumatic.      Nose: Nose normal.   Eyes:      Conjunctiva/sclera: Conjunctivae normal.      Pupils: Pupils are equal, round, and reactive to light.   Neck:      Thyroid: No thyromegaly.   Cardiovascular:      Rate and Rhythm: Normal rate and regular rhythm.      Heart sounds: Normal heart sounds. No murmur heard.  Pulmonary:      Effort: Pulmonary effort is normal. No respiratory distress.      Breath sounds: Normal breath sounds. No wheezing or rales.   Abdominal:      General: Abdomen is flat. Bowel sounds are normal. There is no distension.      Palpations: Abdomen is soft. There is no hepatomegaly, splenomegaly or mass.      Tenderness: There is no abdominal tenderness. There is no guarding or rebound.      Hernia: No hernia is present.   Musculoskeletal:         General: Normal range of motion.      Cervical back: Normal range of motion and neck supple.      Right lower leg: No edema.      Left lower leg: No edema.      Comments: Right knee --more generalized anterior knee pain, no joint effusion, negative Braxton's   Lymphadenopathy:      Cervical: No cervical adenopathy.   Skin:     General: Skin is warm.   Neurological:      General: No focal deficit present.      Mental Status: She is alert.   Psychiatric:         Mood and Affect: Mood normal.         Behavior: Behavior normal.         Thought Content: Thought content normal.         Judgment: Judgment normal.        Result Review :     Common labs          2/14/2024    10:25 5/3/2024    09:36 8/1/2024    09:15   Common Labs   Glucose 98  93  97    BUN 13  27  19    Creatinine 0.91  1.00  0.92    Sodium 145  143  139    Potassium 4.7  4.2  4.3    Chloride 106  106  104    Calcium 9.4  9.5  9.6    Total Protein 6.5  6.6     Albumin 4.5  4.6  4.4    Total Bilirubin 0.3  0.4  0.3    Alkaline Phosphatase 142  135  139    AST (SGOT) 17  20  24    ALT (SGPT) 16  16  24    WBC 6.10      Hemoglobin 13.2      Hematocrit 40.4      Platelets 269      Total  Cholesterol   213     206    Total Cholesterol 195  205     Triglycerides 78  62  79     75    HDL Cholesterol 101  102  122     118    LDL Cholesterol  80  92  77     75      Lipid Panel          2/14/2024    10:25 5/3/2024    09:36 8/1/2024    09:15   Lipid Panel   Total Cholesterol   213     206    Total Cholesterol 195  205     Triglycerides 78  62  79     75    HDL Cholesterol 101  102  122     118    VLDL Cholesterol 14  11  14     13    LDL Cholesterol  80  92  77     75    LDL/HDL Ratio 0.78  0.89  0.62     0.62      TSH          2/14/2024    10:25   TSH   TSH 1.650            Lab Results   Component Value Date    BJWR03HO 81.8 07/20/2015    FOLATE 19.9 07/20/2015     Lipoprotein A (LPA) (08/01/2024 09:15) --WNL  Apolipoprotein B (08/01/2024 09:15) --WNL       High Sensitivity CRP (08/01/2024 09:15) --WNL    Magnesium (08/01/2024 09:15) --WNL    XR Knee 3+ View With [place] Right (05/03/2024) --moderate medial compartment narrowing           Assessment and Plan    Diagnoses and all orders for this visit:    1. Hyperlipidemia, unspecified hyperlipidemia type (Primary) --remains uncontrolled  Ideally goal LDL needs to be less than 55 given history of CAD.  However statin intolerance, limiting the escalation of her Liavlo.  Currently taking Liavlo 2 mg daily and Zetia 10 mg daily which was recently added just last week.  Plan to continue this regimen x 3 to 4 months before rechecking fasting lipids, CMP, and CK.  Future lab orders are already in the computer by her cardiologist/Dr. Martin for lipids and CMP  -     CK; Future    2. CAD in native artery --stable  Continue with aggressive risk factor control, see plan above.  Continue ASA, beta-blocker, and Imdur as prescribed    3. Primary hypertension --controlled  No change with Bystolic 5 mg daily    4. Hypothyroidism, unspecified type --controlled  TSH up-to-date and therapeutic.  Continue Synthroid at 25 mcg every morning    5. Primary insomnia  --controlled  Continue trazodone 100 mg nightly, will refill upon request    6. Primary osteoarthritis of right knee --remains uncontrolled  S/P repeat cortisone injection given about 2 weeks ago by Dr. Kline, not beneficial (note first cortisone injection given 3 months ago in May 2024 was beneficial.) current x-ray consistent with moderate medial compartment joint space narrowing.  Do suspect general OA.  May continue naproxen 500 mg twice daily.  May add Voltaren gel as directed below as needed.  Referral to Ortho for likely hyaluronic acid injections  -     Ambulatory Referral to Orthopedic Surgery    7. Medically complex patient --see all the above active diagnoses which require close monitoring and longitudinal care.    Other orders  -     levothyroxine (SYNTHROID, LEVOTHROID) 25 MCG tablet; Take 1 tablet by mouth Daily.  Dispense: 90 tablet; Refill: 1  -     Diclofenac Sodium 3 % gel gel; Apply  topically to the appropriate area as directed 2 (Two) Times a Day. for 60 days.  Dispense: 60 g; Refill: 3        Follow Up   Return in about 6 months (around 3/11/2025) for Annual physical.  Patient was given instructions and counseling regarding her condition or for health maintenance advice. Please see specific information pulled into the AVS if appropriate.

## 2024-09-11 NOTE — PATIENT INSTRUCTIONS
Get repeat lab work done in January as ordered by cardiologist and my additional test for muscle enzyme    Referral will be placed to orthopedic doctor for right knee OA    May start Voltaren gel as directed for affected knee, may continue naproxen as planned    Recommend low fat/low calorie diet and exercise greater than 150 minutes of cardio per week.    Continue current treatment plan.

## 2024-09-13 ENCOUNTER — TELEPHONE (OUTPATIENT)
Dept: FAMILY MEDICINE CLINIC | Facility: CLINIC | Age: 64
End: 2024-09-13
Payer: COMMERCIAL

## 2024-09-13 NOTE — TELEPHONE ENCOUNTER
Pt stated that [facility] told her that she needed a PA for the   Diclofenac Sodium 3 % gel gel     Please advise.

## 2024-09-16 ENCOUNTER — TELEPHONE (OUTPATIENT)
Dept: FAMILY MEDICINE CLINIC | Facility: CLINIC | Age: 64
End: 2024-09-16
Payer: COMMERCIAL

## 2024-09-17 ENCOUNTER — TELEPHONE (OUTPATIENT)
Dept: FAMILY MEDICINE CLINIC | Facility: CLINIC | Age: 64
End: 2024-09-17

## 2024-09-17 DIAGNOSIS — M17.11 PRIMARY OSTEOARTHRITIS OF RIGHT KNEE: Primary | ICD-10-CM

## 2024-09-17 NOTE — TELEPHONE ENCOUNTER
PATIENT WANTS TO SEE QUAN REICH UNLESS DR. GARCIA THINKS THAT IT IS BETTER SHE SHE DR. POLLOCK. FOR HER KNEE.  THANKS.

## 2024-09-23 DIAGNOSIS — M17.11 PRIMARY OSTEOARTHRITIS OF RIGHT KNEE: Primary | ICD-10-CM

## 2024-09-24 ENCOUNTER — TELEPHONE (OUTPATIENT)
Dept: FAMILY MEDICINE CLINIC | Facility: CLINIC | Age: 64
End: 2024-09-24
Payer: COMMERCIAL

## 2024-09-24 RX ORDER — DICLOFENAC SODIUM 30 MG/G
GEL TOPICAL 2 TIMES DAILY
Qty: 60 G | Refills: 3 | Status: SHIPPED | OUTPATIENT
Start: 2024-09-24

## 2024-10-18 ENCOUNTER — TELEPHONE (OUTPATIENT)
Dept: CARDIOLOGY | Facility: CLINIC | Age: 64
End: 2024-10-18

## 2024-10-18 NOTE — TELEPHONE ENCOUNTER
"  Caller: Taty Garcia \"Sara\"    Relationship: Self    Best call back number: [phone]        PATIENT IS HAVING SURGERY 11.12.24 AND THE SURGEONS OFFICE FAXED A CLEARANCE TODAY. PLEASE RETURN ASAP  "

## 2024-10-28 RX ORDER — TRAZODONE HYDROCHLORIDE 100 MG/1
100 TABLET ORAL NIGHTLY
Qty: 90 TABLET | Refills: 0 | Status: SHIPPED | OUTPATIENT
Start: 2024-10-28

## 2024-12-27 DIAGNOSIS — M77.41 METATARSALGIA OF RIGHT FOOT: ICD-10-CM

## 2024-12-27 DIAGNOSIS — M19.049 HAND ARTHRITIS: ICD-10-CM

## 2024-12-27 RX ORDER — NAPROXEN 500 MG/1
500 TABLET ORAL 2 TIMES DAILY WITH MEALS
Qty: 60 TABLET | Refills: 0 | Status: SHIPPED | OUTPATIENT
Start: 2024-12-27

## 2025-01-22 RX ORDER — TRAZODONE HYDROCHLORIDE 100 MG/1
100 TABLET ORAL NIGHTLY
Qty: 90 TABLET | Refills: 0 | Status: SHIPPED | OUTPATIENT
Start: 2025-01-22

## 2025-01-29 ENCOUNTER — TELEPHONE (OUTPATIENT)
Dept: FAMILY MEDICINE CLINIC | Facility: CLINIC | Age: 65
End: 2025-01-29

## 2025-01-29 DIAGNOSIS — Z12.31 ENCOUNTER FOR SCREENING MAMMOGRAM FOR BREAST CANCER: Primary | ICD-10-CM

## 2025-01-29 NOTE — TELEPHONE ENCOUNTER
"    Caller: Taty Garcia \"Sara\"    Relationship: Self    Best call back number: [phone]     What orders are you requesting (i.e. lab or imaging): YEARLY SCANNING MAMMOGRAM    In what timeframe would the patient need to come in:   NEEDS TO BE DONE IN MARCH    Where will you receive your lab/imaging services: [facility] ([facility])    Additional notes:   PLEASE CALL WHEN ORDER SENT          "

## 2025-02-17 ENCOUNTER — OFFICE VISIT (OUTPATIENT)
Dept: FAMILY MEDICINE CLINIC | Facility: CLINIC | Age: 65
End: 2025-02-17
Payer: COMMERCIAL

## 2025-02-17 VITALS
BODY MASS INDEX: 23.22 KG/M2 | WEIGHT: 139.4 LBS | HEART RATE: 78 BPM | HEIGHT: 65 IN | DIASTOLIC BLOOD PRESSURE: 74 MMHG | TEMPERATURE: 97.1 F | OXYGEN SATURATION: 99 % | SYSTOLIC BLOOD PRESSURE: 124 MMHG

## 2025-02-17 DIAGNOSIS — Z00.00 WELLNESS EXAMINATION: Primary | ICD-10-CM

## 2025-02-17 DIAGNOSIS — I25.10 CAD IN NATIVE ARTERY: ICD-10-CM

## 2025-02-17 DIAGNOSIS — I10 PRIMARY HYPERTENSION: ICD-10-CM

## 2025-02-17 DIAGNOSIS — Z13.1 SCREENING FOR DIABETES MELLITUS: ICD-10-CM

## 2025-02-17 DIAGNOSIS — Z13.21 ENCOUNTER FOR VITAMIN DEFICIENCY SCREENING: ICD-10-CM

## 2025-02-17 DIAGNOSIS — E03.9 HYPOTHYROIDISM, UNSPECIFIED TYPE: ICD-10-CM

## 2025-02-17 DIAGNOSIS — E78.5 HYPERLIPIDEMIA, UNSPECIFIED HYPERLIPIDEMIA TYPE: ICD-10-CM

## 2025-02-17 DIAGNOSIS — Z78.9 MEDICALLY COMPLEX PATIENT: ICD-10-CM

## 2025-02-17 DIAGNOSIS — Z78.0 POSTMENOPAUSAL: ICD-10-CM

## 2025-02-17 DIAGNOSIS — M85.89 OSTEOPENIA OF MULTIPLE SITES: ICD-10-CM

## 2025-02-17 DIAGNOSIS — F51.01 PRIMARY INSOMNIA: ICD-10-CM

## 2025-02-17 DIAGNOSIS — R25.2 LEG CRAMPS: ICD-10-CM

## 2025-02-17 DIAGNOSIS — M17.11 PRIMARY OSTEOARTHRITIS OF RIGHT KNEE: ICD-10-CM

## 2025-02-17 PROCEDURE — 99214 OFFICE O/P EST MOD 30 MIN: CPT | Performed by: FAMILY MEDICINE

## 2025-02-17 PROCEDURE — 99396 PREV VISIT EST AGE 40-64: CPT | Performed by: FAMILY MEDICINE

## 2025-02-17 NOTE — PROGRESS NOTES
Chief Complaint  Annual Exam (Yearly wellness mammogram and DEXA UTD last colonoscopy done 2021 not fasting), Hyperlipidemia, Hypothyroidism, Hypertension, Insomnia, and Osteoarthritis (S/P partial knee replacement in November has some questions)      NEEDS ANNUAL WELLNESS  And  6-month follow-up on hyperlipidemia, CAD history, HTN, insomnia, hypothyroidism, OA, and recent partial right knee replacement    Last visit with me in September 2024 for chronic med refills with labs, uncontrolled lipids, and uncontrolled right knee pain/OA  -- Labs not completed, given just recently added Zetia.  Therefore lab orders were placed to recheck lipids in 6 to 8 weeks.  -- Referred to Ortho due to persistent right knee OA/uncontrolled.    She never did complete the labs as requested at last visit because ended up having a partial right knee replacement and decided to hold her Liavlo due to fear of muscle cramps/leg cramps getting worse.  Has not been taking her cholesterol medication x 6 to 8 weeks.  Just restarted January 2025    Other interval history since last seen by me --- consulted with Ortho about 3 months ago/October 2024 with Dr. Ravinder Courtney.  Plans were for partial right total knee replacement due to significant right knee OA.  Preop labs done, cardiac clearance given by Dr. Martin and proceed with the outpatient surgery about 2 months ago on 11/12/2024.  No postop complications.  Did well.     Doing much better.  S/P right partial total knee replacement November 2024, nice recovery.  Started back to her walking, trying to start slowly and increase her intensity duration.  Initially went well but did have some pain and discomfort after the second or third day.  Would like to know what she can take.  Taking naproxen as needed?    Still working full-time, [employer].  Planning to retire in 2025.  Continues to work hard to maintain a low-cholesterol and healthy heart diet.  Regular/daily  "exercise.  Weight stable.  Mood is good.  Sleep is adequate, much better since the addition of trazodone.  No reported chest pain, SOA, or edema.  No headaches.     Uncertain about needed refills today?  Due for repeat fasting labs, although held her cholesterol medication while recovering from her knee surgery due to fear of increased leg cramps.    Only back on lipid meds now x 4 to 6 weeks.  Compliant with and tolerates all medications without side effects, including statin/Liavlo low-dose.        Routine health maintenance/screening test:  PAP --- S/P BAUTISTA, benign reasons  MAMMO --- January 2024, negative, has appointment next month  DEXA --- January 2023  Colorectal Screen --- C-scope done in December 2021, hyperplastic polyps, repeat in 10 years  Vaccines --- not up-to-date  Smoking/ETOH Status --- non-smoker.  Social alcohol only  Dentist, Eye Exam, Derm --- maintains regular dental visits, ophthalmology exam, has Derm  Diet/Exercise --- maintains a healthy low-cholesterol diet and daily cardio exercise  Pertinent FH --- CAD/father, CVA/mother, negative for colon cancer, breast cancer/mother            Review of Systems   Constitutional:  Negative for fever and unexpected weight change.   Respiratory:  Negative for cough and shortness of breath.    Cardiovascular:  Negative for chest pain.        Subjective          Taty Garcia presents to [facility]    Objective   Vital Signs:   Vitals:    02/17/25 1521   BP: 124/74   BP Location: Right arm   Patient Position: Sitting   Cuff Size: Adult   Pulse: 78   Temp: 97.1 °F (36.2 °C)   SpO2: 99%   Weight: 63.2 kg (139 lb 6.4 oz)   Height: 165.1 cm (65\")      Body mass index is 23.2 kg/m².   Physical Exam  Vitals and nursing note reviewed.   Constitutional:       Appearance: Normal appearance. She is well-developed and normal weight.   HENT:      Head: Normocephalic and atraumatic.      Nose: Nose normal.   Eyes:      " Conjunctiva/sclera: Conjunctivae normal.      Pupils: Pupils are equal, round, and reactive to light.   Neck:      Thyroid: No thyromegaly.   Cardiovascular:      Rate and Rhythm: Normal rate and regular rhythm.      Heart sounds: Normal heart sounds. No murmur heard.  Pulmonary:      Effort: Pulmonary effort is normal.      Breath sounds: Normal breath sounds.   Abdominal:      General: Abdomen is flat. Bowel sounds are normal. There is no distension.      Palpations: Abdomen is soft. There is no hepatomegaly, splenomegaly or mass.      Tenderness: There is no abdominal tenderness. There is no guarding or rebound.      Hernia: No hernia is present.   Musculoskeletal:         General: Normal range of motion.      Cervical back: Normal range of motion and neck supple.      Right lower leg: No edema.      Left lower leg: No edema.      Comments: Right knee --scar looks good, no erythema, normal range of motion, no edema   Lymphadenopathy:      Cervical: No cervical adenopathy.   Skin:     General: Skin is warm.   Neurological:      General: No focal deficit present.      Mental Status: She is alert.   Psychiatric:         Mood and Affect: Mood normal.         Behavior: Behavior normal.         Thought Content: Thought content normal.         Judgment: Judgment normal.        Result Review :     Common labs          5/3/2024    09:36 8/1/2024    09:15   Common Labs   Glucose 93  97    BUN 27  19    Creatinine 1.00  0.92    Sodium 143  139    Potassium 4.2  4.3    Chloride 106  104    Calcium 9.5  9.6    Albumin 4.6  4.4    Total Bilirubin 0.4  0.3    Alkaline Phosphatase 135  139    AST (SGOT) 20  24    ALT (SGPT) 16  24    Total Cholesterol  213     206    Total Cholesterol 205     Triglycerides 62  79     75    HDL Cholesterol 102  122     118    LDL Cholesterol  92  77     75      Lipid Panel          5/3/2024    09:36 8/1/2024    09:15   Lipid Panel   Total Cholesterol  213     206    Total Cholesterol 205      Triglycerides 62  79     75    HDL Cholesterol 102  122     118    VLDL Cholesterol 11  14     13    LDL Cholesterol  92  77     75    LDL/HDL Ratio 0.89  0.62     0.62              Lab Results   Component Value Date    EFUO43EE 81.8 07/20/2015    FOLATE 19.9 07/20/2015      BASIC METABOLIC PANEL (10/18/2024 11:06) preop, WNL  CBC AND DIFFERENTIAL (10/18/2024 11:06) preop, WNL           Assessment and Plan    Diagnoses and all orders for this visit:    1. Wellness examination (Primary) --within normal limits  Needed screening includes: Mammogram, DEXA, pneumococcal 20 vaccine, COVID-vaccine and shingles vaccine.  Plans to obtain shingles vaccine from pharmacy in near future, declines COVID-vaccine.  See orders below.  Otherwise, continue to improve upon healthy lifestyle ---Needs low-carb/low calorie/low cholesterol diet and increase cardio exercise to greater than 150 minutes weekly  -     CBC & Differential; Future    2. Postmenopausal  -     DEXA Bone Density Axial; Future    3. Osteopenia of multiple sites --for repeat DEXA  Increase strength/weightbearing exercise along with cardio exercise greater than 150 minutes weekly.  Also, continue calcium 1200 to 1500 mg daily, may take Os-Wang 2/day    4. Screening for diabetes mellitus  -     Hemoglobin A1c; Future    5. Encounter for vitamin deficiency screening  -     Vitamin D,25-Hydroxy; Future    6. Primary osteoarthritis of right knee --S/P partial right knee replacement about 2 months ago, doing well  Nice recovery.  Has follow-up with Ortho next month.  May take naproxen as needed.  Discussed gradual return to normal activity as tolerated.    7. CAD in native artery --controlled  Continue ASA, statin, Imdur    8. Hyperlipidemia, unspecified hyperlipidemia type --uncontrolled  LDL not quite at goal at last visit, still greater than 55 given history of CAD.  Hopefully this is improved since adding Zetia about 6 months ago.  However, she held both her Zetia and  Liavlo x 6 to 8 weeks when recovering from her right partial knee replacement due to fear of having increased leg cramps.  Has only been back on lipid medication now x 4 to 6 weeks.  Plan to recheck labs in 2 to 3 months, orders placed.  -     Comprehensive Metabolic Panel; Future  -     Lipid Panel With LDL / HDL Ratio; Future    9. Leg cramps --fair control  Intolerant to other statins.  Only tolerating low-dose Liavlo and Zetia.  Recheck CK, if elevated then will need to discontinue all statins entirely and started PSK 9 inhibitor?  Previously on Repatha, stopped due to cost?  -     CK; Future    10. Primary hypertension --controlled  No change with Bystolic    11. Hypothyroidism, unspecified type --appears controlled  Due to recheck TSH, therapeutic then no change with Synthroid  -     TSH; Future    12. Primary insomnia --controlled, doing much better with trazodone    13. Medically complex patient -- See all the above active chronic diagnoses that require close monitoring and longitudinal care.      In addition to wellness exam today, seen and treated for separate and identifiable --- uncontrolled hyperlipidemia in the setting of CAD, leg cramps, follow-up new right partial knee replacement    If all labs WNL and doing well the may follow-up in 6 months        Follow Up   Return in about 6 months (around 8/17/2025) for Recheck.  Patient was given instructions and counseling regarding her condition or for health maintenance advice. Please see specific information pulled into the AVS if appropriate.

## 2025-02-17 NOTE — PATIENT INSTRUCTIONS
Needs to get lab work in about 6 to 8 weeks, orders will be given to you    May receive shingles vaccine from pharmacy in near future    Continue with healthy lifestyle, doing a great job--Needs low-carb/low calorie/low cholesterol diet and increase cardio exercise to greater than 150 minutes weekly

## 2025-03-01 DIAGNOSIS — M77.41 METATARSALGIA OF RIGHT FOOT: ICD-10-CM

## 2025-03-01 DIAGNOSIS — M19.049 HAND ARTHRITIS: ICD-10-CM

## 2025-03-01 NOTE — TELEPHONE ENCOUNTER
Please while while PCP is out of the office    Rx Refill Note  Requested Prescriptions     Pending Prescriptions Disp Refills    naproxen (NAPROSYN) 500 MG tablet [Pharmacy Med Name: Naproxen Oral Tablet 500 MG] 60 tablet 0     Sig: TAKE 1 TABLET BY MOUTH 2 TIMES A DAY WITH MEALS      Last office visit with prescribing clinician: 2/17/2025   Last telemedicine visit with prescribing clinician: Visit date not found   Next office visit with prescribing clinician: 8/18/2025                         Would you like a call back once the refill request has been completed: [] Yes [] No    If the office needs to give you a call back, can they leave a voicemail: [] Yes [] No    Yessy Canales CMA/LMR  03/01/25, 13:58 EST

## 2025-03-02 RX ORDER — NAPROXEN 500 MG/1
500 TABLET ORAL 2 TIMES DAILY WITH MEALS
Qty: 60 TABLET | Refills: 0 | Status: SHIPPED | OUTPATIENT
Start: 2025-03-02

## 2025-03-11 DIAGNOSIS — Z12.31 ENCOUNTER FOR SCREENING MAMMOGRAM FOR BREAST CANCER: ICD-10-CM

## 2025-03-19 ENCOUNTER — OFFICE VISIT (OUTPATIENT)
Dept: FAMILY MEDICINE CLINIC | Facility: CLINIC | Age: 65
End: 2025-03-19
Payer: COMMERCIAL

## 2025-03-19 VITALS
DIASTOLIC BLOOD PRESSURE: 68 MMHG | TEMPERATURE: 97.1 F | OXYGEN SATURATION: 99 % | SYSTOLIC BLOOD PRESSURE: 126 MMHG | BODY MASS INDEX: 23.16 KG/M2 | WEIGHT: 139 LBS | HEIGHT: 65 IN | HEART RATE: 74 BPM

## 2025-03-19 DIAGNOSIS — J01.91 ACUTE RECURRENT SINUSITIS, UNSPECIFIED LOCATION: Primary | ICD-10-CM

## 2025-03-19 DIAGNOSIS — J02.9 SORE THROAT: ICD-10-CM

## 2025-03-19 LAB
EXPIRATION DATE: NORMAL
EXPIRATION DATE: NORMAL
FLUAV AG UPPER RESP QL IA.RAPID: NOT DETECTED
FLUBV AG UPPER RESP QL IA.RAPID: NOT DETECTED
INTERNAL CONTROL: NORMAL
INTERNAL CONTROL: NORMAL
Lab: NORMAL
Lab: NORMAL
S PYO AG THROAT QL: NEGATIVE
SARS-COV-2 AG UPPER RESP QL IA.RAPID: NOT DETECTED

## 2025-03-19 PROCEDURE — 87880 STREP A ASSAY W/OPTIC: CPT | Performed by: STUDENT IN AN ORGANIZED HEALTH CARE EDUCATION/TRAINING PROGRAM

## 2025-03-19 PROCEDURE — 87428 SARSCOV & INF VIR A&B AG IA: CPT | Performed by: STUDENT IN AN ORGANIZED HEALTH CARE EDUCATION/TRAINING PROGRAM

## 2025-03-19 PROCEDURE — 99214 OFFICE O/P EST MOD 30 MIN: CPT | Performed by: STUDENT IN AN ORGANIZED HEALTH CARE EDUCATION/TRAINING PROGRAM

## 2025-03-19 RX ORDER — DOXYCYCLINE 100 MG/1
100 CAPSULE ORAL 2 TIMES DAILY
Qty: 10 CAPSULE | Refills: 0 | Status: SHIPPED | OUTPATIENT
Start: 2025-03-19

## 2025-03-19 NOTE — PROGRESS NOTES
"Chief Complaint  Sore Throat, Nasal Congestion, Headache, and Hoarse    Subjective        Taty Garcia presents to [facility]  History of Present Illness       64-year-old female who presents for evaluation of cough, congestion, and sore throat.    Her symptoms began approximately 3.5 weeks ago with a cough and congestion, initially managed with Mucinex. After 4 to 5 days, she started expectorating green sputum. 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 She was treated with amoxicillin for 7 days and a cough suppressant, which improved her condition within 1 to 2 days. However, 2 to 3 days ago, she noticed postnasal drainage and developed a sore throat that has persisted for 4 to 5 days. Yesterday, she experienced voice loss and severe coughing, which was somewhat alleviated by the cough medicine and Mucinex. She reports feeling fatigued but does not have any current fevers, chills, or body aches. She also reports ear discomfort, particularly in the left ear, but no shortness of breath or wheezing. She has been producing green sputum for the past two mornings. Additionally, she reports snoring at night. She has been using a nasal spray prescribed during her telehealth consultation.    She has a history of severe allergies, which are well-managed with monthly allergy injections.    MEDICATIONS  Current: Mucinex, amoxicillin       Objective   Vital Signs:  /68   Pulse 74   Temp 97.1 °F (36.2 °C)   Ht 165.1 cm (65\")   Wt 63 kg (139 lb)   SpO2 99%   BMI 23.13 kg/m²   Estimated body mass index is 23.13 kg/m² as calculated from the following:    Height as of this encounter: 165.1 cm (65\").    Weight as of this encounter: 63 kg (139 lb).    BMI is within normal parameters. No other follow-up for BMI required.      Physical " Exam  Constitutional:       General: She is not in acute distress.  HENT:      Head: Normocephalic and atraumatic.      Right Ear: Tympanic membrane normal.      Left Ear: Tympanic membrane normal.      Nose: Congestion and rhinorrhea present.      Mouth/Throat:      Mouth: Mucous membranes are moist.      Pharynx: Posterior oropharyngeal erythema present. No oropharyngeal exudate.   Eyes:      General: No scleral icterus.     Conjunctiva/sclera: Conjunctivae normal.   Cardiovascular:      Rate and Rhythm: Normal rate and regular rhythm.   Pulmonary:      Effort: Pulmonary effort is normal. No respiratory distress.   Skin:     General: Skin is warm and dry.   Neurological:      Mental Status: She is alert and oriented to person, place, and time.   Psychiatric:         Mood and Affect: Mood normal.         Behavior: Behavior normal.        Result Review :  The following data was reviewed by: Nguyen Ortiz MD on 03/19/2025:  Common labs          5/3/2024    09:36 8/1/2024    09:15   Common Labs   Glucose 93  97    BUN 27  19    Creatinine 1.00  0.92    Sodium 143  139    Potassium 4.2  4.3    Chloride 106  104    Calcium 9.5  9.6    Albumin 4.6  4.4    Total Bilirubin 0.4  0.3    Alkaline Phosphatase 135  139    AST (SGOT) 20  24    ALT (SGPT) 16  24    Total Cholesterol  213     206    Total Cholesterol 205     Triglycerides 62  79     75    HDL Cholesterol 102  122     118    LDL Cholesterol  92  77     75      Data reviewed : None           Assessment and Plan   Diagnoses and all orders for this visit:    1. Acute recurrent sinusitis, unspecified location (Primary)  -     doxycycline (VIBRAMYCIN) 100 MG capsule; Take 1 capsule by mouth 2 (Two) Times a Day.  Dispense: 10 capsule; Refill: 0    2. Sore throat  -     POCT rapid strep A  -     POCT SARS-CoV-2 Antigen SRINATH + Flu           1. Recurrent bacterial sinusitis.  The patient likely had a viral infection initially, which may have led to a secondary bacterial  infection resulting in sinusitis. Symptoms include productive cough, sore throat, and nasal drainage. Examination revealed a red throat and nasal passages, with no signs of ear infection. Doxycycline will be prescribed for 5 days, to be taken twice daily. She is advised to start over-the-counter Flonase to help with mucus membrane inflammation and sinus drainage. If symptoms persist after completing the antibiotic course, it may indicate a non-bacterial cause, and she should follow up with her allergist.    2. Allergies.  The patient has a history of allergies and receives monthly allergy shots, which she reports as effective. She is advised to continue her current allergy regimen. If symptoms do not improve, she may need to consult her allergist to adjust her allergy management plan.            Follow Up   No follow-ups on file.  Patient was given instructions and counseling regarding her condition or for health maintenance advice. Please see specific information pulled into the AVS if appropriate.     Patient or patient representative verbalized consent for the use of Ambient Listening during the visit with  Nguyen Ortiz MD for chart documentation. 3/19/2025  15:37 EDT

## 2025-03-20 NOTE — PROGRESS NOTES
"Subjective   Taty Rosario is a 56 y.o. female.     History of Present Illness   Well Adult Physical: Patient here for a comprehensive physical exam.The patient reports no problems  Do you take any herbs or supplements that were not prescribed by a doctor? no Are you taking calcium supplements? no Are you taking aspirin daily? no        The following portions of the patient's history were reviewed and updated as appropriate: allergies, current medications, past social history and problem list.    Review of Systems   Constitutional: Negative for fever.   All other systems reviewed and are negative.      Objective   /70 (BP Location: Right arm, Patient Position: Sitting)  Pulse 70  Temp 98.5 °F (36.9 °C)  Ht 65.5\" (166.4 cm)  Wt 125 lb 12.8 oz (57.1 kg)  SpO2 98%  BMI 20.62 kg/m2  Physical Exam   Constitutional: She is oriented to person, place, and time. She appears well-developed and well-nourished. No distress.   HENT:   Head: Normocephalic and atraumatic. Hair is normal.   Right Ear: Hearing, tympanic membrane, external ear and ear canal normal. No drainage. No decreased hearing is noted.   Left Ear: Hearing, tympanic membrane, external ear and ear canal normal. No decreased hearing is noted.   Nose: No nasal deformity.   Mouth/Throat: Oropharynx is clear and moist.   Eyes: Conjunctivae, EOM and lids are normal. Pupils are equal, round, and reactive to light. Right eye exhibits no discharge. Left eye exhibits no discharge.   Neck: Normal range of motion. Neck supple. No JVD present. No tracheal deviation present. No thyromegaly present.   Cardiovascular: Normal rate, regular rhythm, normal heart sounds, intact distal pulses and normal pulses.  Exam reveals no gallop and no friction rub.    No murmur heard.  Pulmonary/Chest: Effort normal and breath sounds normal. No respiratory distress. She has no wheezes. She has no rales. She exhibits no tenderness.   Abdominal: Soft. Bowel sounds are normal. " Infectious Disease Wound Care She exhibits no distension and no mass. There is no tenderness. There is no rebound and no guarding. No hernia.   Musculoskeletal: Normal range of motion. She exhibits no edema, tenderness or deformity.   Lymphadenopathy:     She has no cervical adenopathy.   Neurological: She is alert and oriented to person, place, and time. She has normal reflexes. She displays normal reflexes. No cranial nerve deficit. She exhibits normal muscle tone. Coordination normal.   Skin: Skin is warm and dry. No rash noted. She is not diaphoretic. No erythema.   Psychiatric: She has a normal mood and affect. Her behavior is normal. Judgment and thought content normal.   Vitals reviewed.      Assessment/Plan   Problem List Items Addressed This Visit        Cardiovascular and Mediastinum    Hyperlipidemia    Relevant Medications    REPATHA SURECLICK 140 MG/ML solution auto-injector    zolpidem CR (AMBIEN CR) 12.5 MG CR tablet       Other    Routine general medical examination at health care facility - Primary    Relevant Orders    POC Urinalysis Dipstick, Automated    Lipid Panel With LDL / HDL Ratio    Comprehensive Metabolic Panel    CBC & Differential        Follow up after labs  Discuss diet and exercise cont same     Urology Rehab Medicine Nephrology

## 2025-03-21 RX ORDER — CEFDINIR 300 MG/1
300 CAPSULE ORAL 2 TIMES DAILY
Qty: 10 CAPSULE | Refills: 0 | Status: SHIPPED | OUTPATIENT
Start: 2025-03-21 | End: 2025-03-26

## 2025-03-21 RX ORDER — LEVOTHYROXINE SODIUM 25 UG/1
25 TABLET ORAL DAILY
Qty: 90 TABLET | Refills: 1 | Status: SHIPPED | OUTPATIENT
Start: 2025-03-21

## 2025-03-21 NOTE — TELEPHONE ENCOUNTER
Rx Refill Note  Requested Prescriptions     Pending Prescriptions Disp Refills    levothyroxine (SYNTHROID, LEVOTHROID) 25 MCG tablet [Pharmacy Med Name: Levothyroxine Sodium Oral Tablet 25 MCG] 90 tablet 0     Sig: TAKE 1 TABLET BY MOUTH EVERY DAY      Last office visit with prescribing clinician: 2/17/2025   Last telemedicine visit with prescribing clinician: Visit date not found   Next office visit with prescribing clinician: 8/18/2025                         Would you like a call back once the refill request has been completed: [] Yes [] No    If the office needs to give you a call back, can they leave a voicemail: [] Yes [] No    Annel Sharp MA  03/21/25, 07:03 EDT

## 2025-04-01 ENCOUNTER — TELEPHONE (OUTPATIENT)
Dept: CARDIOLOGY | Age: 65
End: 2025-04-01

## 2025-04-01 DIAGNOSIS — Z13.21 ENCOUNTER FOR VITAMIN DEFICIENCY SCREENING: ICD-10-CM

## 2025-04-01 DIAGNOSIS — M19.049 ARTHRITIS OF HAND, UNSPECIFIED LATERALITY: ICD-10-CM

## 2025-04-01 DIAGNOSIS — E03.9 HYPOTHYROIDISM, UNSPECIFIED TYPE: ICD-10-CM

## 2025-04-01 DIAGNOSIS — Z13.1 SCREENING FOR DIABETES MELLITUS: ICD-10-CM

## 2025-04-01 DIAGNOSIS — M85.89 OSTEOPENIA OF MULTIPLE SITES: ICD-10-CM

## 2025-04-01 DIAGNOSIS — R25.2 LEG CRAMPS: ICD-10-CM

## 2025-04-01 DIAGNOSIS — E78.5 HYPERLIPIDEMIA, UNSPECIFIED HYPERLIPIDEMIA TYPE: Primary | ICD-10-CM

## 2025-04-01 DIAGNOSIS — M17.11 PRIMARY OSTEOARTHRITIS OF RIGHT KNEE: ICD-10-CM

## 2025-04-01 DIAGNOSIS — E78.5 HYPERLIPIDEMIA, UNSPECIFIED HYPERLIPIDEMIA TYPE: ICD-10-CM

## 2025-04-01 NOTE — TELEPHONE ENCOUNTER
"Caller: Taty Garcia \"Sara\"    Relationship to patient: Self    Best call back number: [phone]     Patient is needing: PT WENT TO HAVE LABS DONE TODAY AND [facility] DID NOT HAVE LAB ORDERS BUT PT STATED SHE WAS SUPPOSED TO HAVE THESE DONE IN JANUARY SO ORDERS MAY HAVE . PT HAD RECENT LABS DONE WITH HER PCP AND WAS WONDERING IF WE COULD TAKE THOSE. PLS CALL & ADVISE.      "

## 2025-04-02 LAB
25(OH)D3+25(OH)D2 SERPL-MCNC: 71.2 NG/ML (ref 30–100)
ALBUMIN SERPL-MCNC: 4.3 G/DL (ref 3.5–5.2)
ALBUMIN/GLOB SERPL: 2.2 G/DL
ALP SERPL-CCNC: 131 U/L (ref 39–117)
ALT SERPL-CCNC: 22 U/L (ref 1–33)
AST SERPL-CCNC: 25 U/L (ref 1–32)
BASOPHILS # BLD AUTO: 0.06 10*3/MM3 (ref 0–0.2)
BASOPHILS NFR BLD AUTO: 1.1 % (ref 0–1.5)
BILIRUB SERPL-MCNC: 0.4 MG/DL (ref 0–1.2)
BUN SERPL-MCNC: 18 MG/DL (ref 8–23)
BUN/CREAT SERPL: 18.2 (ref 7–25)
CALCIUM SERPL-MCNC: 9.3 MG/DL (ref 8.6–10.5)
CHLORIDE SERPL-SCNC: 105 MMOL/L (ref 98–107)
CHOLEST SERPL-MCNC: 155 MG/DL (ref 0–200)
CK SERPL-CCNC: 73 U/L (ref 20–180)
CO2 SERPL-SCNC: 30.8 MMOL/L (ref 22–29)
CREAT SERPL-MCNC: 0.99 MG/DL (ref 0.57–1)
EGFRCR SERPLBLD CKD-EPI 2021: 64.6 ML/MIN/1.73
EOSINOPHIL # BLD AUTO: 0.2 10*3/MM3 (ref 0–0.4)
EOSINOPHIL NFR BLD AUTO: 3.8 % (ref 0.3–6.2)
ERYTHROCYTE [DISTWIDTH] IN BLOOD BY AUTOMATED COUNT: 11.8 % (ref 12.3–15.4)
GLOBULIN SER CALC-MCNC: 2 GM/DL
GLUCOSE SERPL-MCNC: 99 MG/DL (ref 65–99)
HBA1C MFR BLD: 5.7 % (ref 4.8–5.6)
HCT VFR BLD AUTO: 40.3 % (ref 34–46.6)
HDLC SERPL-MCNC: 87 MG/DL (ref 40–60)
HGB BLD-MCNC: 13.5 G/DL (ref 12–15.9)
IMM GRANULOCYTES # BLD AUTO: 0.02 10*3/MM3 (ref 0–0.05)
IMM GRANULOCYTES NFR BLD AUTO: 0.4 % (ref 0–0.5)
LDLC SERPL CALC-MCNC: 56 MG/DL (ref 0–100)
LDLC/HDLC SERPL: 0.65 {RATIO}
LYMPHOCYTES # BLD AUTO: 1.58 10*3/MM3 (ref 0.7–3.1)
LYMPHOCYTES NFR BLD AUTO: 29.9 % (ref 19.6–45.3)
MCH RBC QN AUTO: 31 PG (ref 26.6–33)
MCHC RBC AUTO-ENTMCNC: 33.5 G/DL (ref 31.5–35.7)
MCV RBC AUTO: 92.4 FL (ref 79–97)
MONOCYTES # BLD AUTO: 0.52 10*3/MM3 (ref 0.1–0.9)
MONOCYTES NFR BLD AUTO: 9.8 % (ref 5–12)
NEUTROPHILS # BLD AUTO: 2.91 10*3/MM3 (ref 1.7–7)
NEUTROPHILS NFR BLD AUTO: 55 % (ref 42.7–76)
NRBC BLD AUTO-RTO: 0 /100 WBC (ref 0–0.2)
PLATELET # BLD AUTO: 278 10*3/MM3 (ref 140–450)
POTASSIUM SERPL-SCNC: 4.7 MMOL/L (ref 3.5–5.2)
PROT SERPL-MCNC: 6.3 G/DL (ref 6–8.5)
RBC # BLD AUTO: 4.36 10*6/MM3 (ref 3.77–5.28)
SODIUM SERPL-SCNC: 143 MMOL/L (ref 136–145)
TRIGL SERPL-MCNC: 59 MG/DL (ref 0–150)
TSH SERPL DL<=0.005 MIU/L-ACNC: 2.26 UIU/ML (ref 0.27–4.2)
VLDLC SERPL CALC-MCNC: 12 MG/DL (ref 5–40)
WBC # BLD AUTO: 5.29 10*3/MM3 (ref 3.4–10.8)

## 2025-04-17 RX ORDER — NEBIVOLOL 5 MG/1
5 TABLET ORAL DAILY
Qty: 90 TABLET | Refills: 1 | Status: SHIPPED | OUTPATIENT
Start: 2025-04-17

## 2025-04-21 RX ORDER — TRAZODONE HYDROCHLORIDE 100 MG/1
100 TABLET ORAL
Qty: 90 TABLET | Refills: 0 | Status: SHIPPED | OUTPATIENT
Start: 2025-04-21

## 2025-05-05 DIAGNOSIS — M19.049 HAND ARTHRITIS: ICD-10-CM

## 2025-05-05 DIAGNOSIS — M77.41 METATARSALGIA OF RIGHT FOOT: ICD-10-CM

## 2025-05-05 RX ORDER — NAPROXEN 500 MG/1
500 TABLET ORAL 2 TIMES DAILY WITH MEALS
Qty: 60 TABLET | Refills: 0 | Status: SHIPPED | OUTPATIENT
Start: 2025-05-05

## 2025-06-05 NOTE — INTERVAL H&P NOTE
This is a woman who has had a prior history of coronary disease now comes in with recurrent angina new onset and has been referred for cath.  H&P reviewed. The patient was examined and there are no changes to the H&P. I have explained the risks and benefits of the procedure to the patient.  The patient understands and agrees to proceed        CC:  Larry Ewing is here today with c/o epigastric pain mainly after eating too fast since Sunday.      Medications: medications verified and updated  Refills needed today?  NO  denies Latex allergy or sensitivity  Tobacco history: verified     Health Maintenance       COVID-19 Vaccine (8 - Moderna risk 2024-25 season)  Overdue since 3/24/2025    Respiratory Syncytial Virus (RSV) Vaccine 60+ (1 - 1-dose 75+ series)  Never done    Traditional Medicare- Medicare Wellness Visit (Yearly)  Due soon on 11/1/2025           Following review of the above:  Patient is not proceeding with: COVID-19 and Respiratory Syncytial Virus (RSV)    Note: Refer to final orders and clinician documentation.

## 2025-06-21 DIAGNOSIS — M19.049 HAND ARTHRITIS: ICD-10-CM

## 2025-06-21 DIAGNOSIS — M77.41 METATARSALGIA OF RIGHT FOOT: ICD-10-CM

## 2025-06-23 RX ORDER — NAPROXEN 500 MG/1
500 TABLET ORAL 2 TIMES DAILY WITH MEALS
Qty: 60 TABLET | Refills: 0 | Status: SHIPPED | OUTPATIENT
Start: 2025-06-23

## 2025-06-27 ENCOUNTER — OFFICE VISIT (OUTPATIENT)
Dept: FAMILY MEDICINE CLINIC | Facility: CLINIC | Age: 65
End: 2025-06-27
Payer: COMMERCIAL

## 2025-06-27 VITALS
DIASTOLIC BLOOD PRESSURE: 60 MMHG | SYSTOLIC BLOOD PRESSURE: 100 MMHG | HEART RATE: 78 BPM | BODY MASS INDEX: 23.26 KG/M2 | TEMPERATURE: 98.7 F | WEIGHT: 139.6 LBS | OXYGEN SATURATION: 97 % | HEIGHT: 65 IN

## 2025-06-27 DIAGNOSIS — R19.7 DIARRHEA, UNSPECIFIED TYPE: Primary | ICD-10-CM

## 2025-06-27 DIAGNOSIS — E78.5 HYPERLIPIDEMIA, UNSPECIFIED HYPERLIPIDEMIA TYPE: ICD-10-CM

## 2025-06-27 DIAGNOSIS — R25.2 LEG CRAMPS: ICD-10-CM

## 2025-06-27 PROCEDURE — 99214 OFFICE O/P EST MOD 30 MIN: CPT | Performed by: FAMILY MEDICINE

## 2025-06-27 RX ORDER — MAGNESIUM OXIDE 400 MG/1
400 TABLET ORAL DAILY
COMMUNITY

## 2025-06-27 RX ORDER — MAGNESIUM OXIDE 400 MG/1
400 TABLET ORAL DAILY
Status: CANCELLED | OUTPATIENT
Start: 2025-06-27

## 2025-06-27 RX ORDER — OMEPRAZOLE 40 MG/1
CAPSULE, DELAYED RELEASE ORAL
Status: CANCELLED | OUTPATIENT
Start: 2025-06-27

## 2025-06-27 RX ORDER — CELECOXIB 200 MG/1
200 CAPSULE ORAL DAILY
COMMUNITY
Start: 2025-03-12 | End: 2025-06-27

## 2025-06-27 RX ORDER — CEFDINIR 300 MG/1
300 CAPSULE ORAL 2 TIMES DAILY
COMMUNITY
End: 2025-06-27

## 2025-06-27 NOTE — PROGRESS NOTES
"Chief Complaint  Diarrhea (GI issues having diarrhea at night wondering if magnesium is cause??)    WORK IN RELATED APPOINTMENT to discuss chronic diarrhea/gut issues    Complains of some issues with diarrhea/loose bowel movements for about 1 to 2 years, but thinks it may be getting worse.  Still occurs intermittently, maybe 1-2 times every few weeks.  Only happens at night, but awakens and may have to rush to the restroom.  No blood, no mucus, no fever  No abdominal pain, nausea or vomiting    No recent use of antibiotics  C-scope up-to-date, 2021, hyperplastic polyps only    Newest medication being Zetia that was added about 1 to 2 years ago.  Also, started herself on some magnesium for history of leg cramps.  And, takes Prilosec daily for GERD.  History of sensitive stomach.  Intolerant to dairy.  Also of note she has been eating more fiber, trying to lose weight.  For dinner often may just have an apple and peanut butter      Answers submitted by the patient for this visit:  Problem not listed (Submitted on 6/24/2025)  Chief Complaint: Other medical problem  Onset: 1 to 5 years  Chronicity: recurrent  Frequency: intermittently  Medications tried: Pepto Bismal/ Imodium            Review of Systems   Constitutional:  Negative for fever and unexpected weight change.   Respiratory:  Negative for cough and shortness of breath.    Cardiovascular:  Negative for chest pain.   Gastrointestinal:  Positive for diarrhea.        Subjective          Taty Garcia presents to [facility]    Objective   Vital Signs:   Vitals:    06/27/25 1534   BP: 100/60   BP Location: Left arm   Patient Position: Sitting   Cuff Size: Adult   Pulse: 78   Temp: 98.7 °F (37.1 °C)   SpO2: 97%   Weight: 63.3 kg (139 lb 9.6 oz)   Height: 165.1 cm (65\")      Body mass index is 23.23 kg/m².   Physical Exam  Vitals and nursing note reviewed.   Constitutional:       General: She is not in acute distress.     Appearance: " Normal appearance. She is well-developed and normal weight. She is not ill-appearing.   HENT:      Head: Normocephalic and atraumatic.      Nose: Nose normal.   Neck:      Thyroid: No thyromegaly.   Cardiovascular:      Rate and Rhythm: Normal rate and regular rhythm.      Heart sounds: Normal heart sounds.   Pulmonary:      Effort: Pulmonary effort is normal. No respiratory distress.      Breath sounds: Normal breath sounds. No wheezing or rales.   Abdominal:      General: Abdomen is flat. Bowel sounds are normal. There is no distension.      Palpations: Abdomen is soft. There is no mass.      Tenderness: There is no abdominal tenderness. There is no guarding or rebound.      Hernia: No hernia is present.   Musculoskeletal:      Cervical back: Normal range of motion and neck supple.      Right lower leg: No edema.      Left lower leg: No edema.   Lymphadenopathy:      Cervical: No cervical adenopathy.   Neurological:      Mental Status: She is alert.        Result Review :     Common labs          8/1/2024    09:15 4/1/2025    09:27   Common Labs   Glucose 97  99    BUN 19  18    Creatinine 0.92  0.99    Sodium 139  143    Potassium 4.3  4.7    Chloride 104  105    Calcium 9.6  9.3    Albumin 4.4  4.3    Total Bilirubin 0.3  0.4    Alkaline Phosphatase 139  131    AST (SGOT) 24  25    ALT (SGPT) 24  22    WBC  5.29    Hemoglobin  13.5    Hematocrit  40.3    Platelets  278    Total Cholesterol 213     206     Total Cholesterol  155    Triglycerides 79     75  59    HDL Cholesterol 122     118  87    LDL Cholesterol  77     75  56    Hemoglobin A1C  5.70          Lab Results   Component Value Date    NSTZ64YT 71.2 04/01/2025    FOLATE 19.9 07/20/2015        TSH (04/01/2025 09:27)            Assessment and Plan    Diagnoses and all orders for this visit:    1. Diarrhea, unspecified type (Primary) --new problem  Chronic, intermittent episodes x 1 to 2 years but becoming more frequent.  See above HPI  Otherwise  asymptomatic.  Occurs nightly (every 1 to 2 weeks.)  Suspect may be related to magnesium supplementation?  And/or daily PPI?  Also could be secondary to the addition of Zetia about 1 year ago?  Discussed a plan for holding magnesium x 2 to 4 weeks, may need to restart 2 to 3 days a week if recurrent leg cramps.  Definitely would like her to only take pantoprazole PRN  If not any better after holding magnesium and PPI, then may stop Zetia to see if this helps.  -     Comprehensive Metabolic Panel; Future  -     Magnesium; Future    2. Hyperlipidemia, unspecified hyperlipidemia type --controlled  Will be due to recheck lipids, LFTs at follow-up in about 6 to 8 weeks.  -     Comprehensive Metabolic Panel; Future  -     Lipid Panel With LDL / HDL Ratio; Future    3. Leg cramps --stable  However going to decrease magnesium supplementation given loose BMs.  Recheck magnesium with routine lab work in 6 to 8 weeks        Follow Up   Return in about 6 weeks (around 8/8/2025) for Recheck, keep regular appointment as planned, need labs prior to appointment.  Patient was given instructions and counseling regarding her condition or for health maintenance advice. Please see specific information pulled into the AVS if appropriate.

## 2025-07-17 RX ORDER — TRAZODONE HYDROCHLORIDE 100 MG/1
100 TABLET ORAL
Qty: 90 TABLET | Refills: 0 | Status: SHIPPED | OUTPATIENT
Start: 2025-07-17

## 2025-07-24 DIAGNOSIS — E78.5 HYPERLIPIDEMIA, UNSPECIFIED HYPERLIPIDEMIA TYPE: ICD-10-CM

## 2025-07-24 DIAGNOSIS — R19.7 DIARRHEA, UNSPECIFIED TYPE: ICD-10-CM

## 2025-08-01 ENCOUNTER — OFFICE VISIT (OUTPATIENT)
Age: 65
End: 2025-08-01
Payer: COMMERCIAL

## 2025-08-01 VITALS
BODY MASS INDEX: 23.49 KG/M2 | WEIGHT: 141 LBS | HEIGHT: 65 IN | DIASTOLIC BLOOD PRESSURE: 80 MMHG | HEART RATE: 75 BPM | SYSTOLIC BLOOD PRESSURE: 122 MMHG

## 2025-08-01 DIAGNOSIS — I25.10 CORONARY ARTERY DISEASE INVOLVING NATIVE CORONARY ARTERY OF NATIVE HEART WITHOUT ANGINA PECTORIS: ICD-10-CM

## 2025-08-01 DIAGNOSIS — I25.10 CAD IN NATIVE ARTERY: Primary | ICD-10-CM

## 2025-08-01 DIAGNOSIS — E78.5 HYPERLIPIDEMIA, UNSPECIFIED HYPERLIPIDEMIA TYPE: ICD-10-CM

## 2025-08-01 DIAGNOSIS — I10 PRIMARY HYPERTENSION: ICD-10-CM

## 2025-08-01 PROBLEM — I20.9 ANGINA PECTORIS: Status: RESOLVED | Noted: 2023-02-21 | Resolved: 2025-08-01

## 2025-08-01 PROBLEM — Z78.9 MEDICALLY COMPLEX PATIENT: Status: RESOLVED | Noted: 2024-09-11 | Resolved: 2025-08-01

## 2025-08-01 PROCEDURE — 99214 OFFICE O/P EST MOD 30 MIN: CPT | Performed by: INTERNAL MEDICINE

## 2025-08-01 PROCEDURE — 93000 ELECTROCARDIOGRAM COMPLETE: CPT | Performed by: INTERNAL MEDICINE

## 2025-08-01 NOTE — PROGRESS NOTES
Date of Office Visit: 25  Encounter Provider: Dk Martin MD  Place of Service: [facility] CARDIOLOGY  Patient Name: Taty Garcia  :1960  [id number]    Chief Complaint   Patient presents with    Coronary Artery Disease   :     HPI: Taty Garcia is a 64 y.o. female in  he had really classic angina and a stress test that was abnormal. She had a cath with a normal right and a normal circumflex but a 90 in her mid-LAD. We ended up doing an angioplasty and placed a 2.75 x28 Xience drug-eluting stent. There was a little bit of pinching of the origin of the diagonal but the flow was perfect. We did OCT this and it looked great and so we left that alone. She has had normal LV.    So she is having angina and in 2023 we did a heart cath on her and it was unchanged the stent looked great there is a little bit of origin diagonal disease there was about a 50% lesion in the RCA but it was not significant.  She has been intolerant of several statins because of myalgias we have her on wilfredo statin and recently was increased from 1 mg to 2 mg in May of 2024 because her LDL was 92.  After this we elected to put her on Zetia.  In 2025 her HDL was 87 her LDL was 56 her LP(a) was 8.4 and her apolipoprotein B was 74    She is here for follow-up.  She is doing well.  No chest pain shortness of breath PND orthopnea edema.  She is pretty active without limitation.  She is tolerating her lipid medicine fine.      Past Medical History:   Diagnosis Date    Allergic     bronchitis, sinustitis    Anxiety     Arthritis     Cervical strain 10/11/2018    Cervicalgia     Chest pain     Coronary artery disease     Depression     Gastroesophageal reflux disease without esophagitis 2021    Heart valve disease 2914    History of coronary artery stent placement 2018    Hyperlipidemia 2017    Hyperplastic colonic polyp 2024    Hypertension     Hypothyroidism  05/16/2023    Insomnia     Lactose intolerance 1995    SOB (shortness of breath)     Tendonitis     UTI (urinary tract infection)        Past Surgical History:   Procedure Laterality Date    AUGMENTATION MAMMAPLASTY Bilateral 2008    SILICONE    BREAST BIOPSY Left     20 YERS AGO BENIGN    BREAST SURGERY      cosmetic procedures    CARDIAC CATHETERIZATION      CARDIAC CATHETERIZATION      CARDIAC CATHETERIZATION Left 02/23/2023    Procedure: Coronary angiography;  Surgeon: Dk Martin MD;  Location: [facility] CATH INVASIVE LOCATION;  Service: Cardiovascular;  Laterality: Left;    CARDIAC CATHETERIZATION N/A 02/23/2023    Procedure: Left heart cath;  Surgeon: Dk Martin MD;  Location: [facility] CATH INVASIVE LOCATION;  Service: Cardiovascular;  Laterality: N/A;    CARDIAC CATHETERIZATION N/A 02/23/2023    Procedure: Left ventriculography;  Surgeon: Dk Martin MD;  [facility];  Service: Cardiovascular;  Laterality: N/A;    CARDIAC CATHETERIZATION  02/23/2023    Procedure: RESTING FULL CYCLE RATIO;  Surgeon: Dk Martin MD;  Location: [facility] CATH INVASIVE LOCATION;  Service: Cardiovascular;;    COLONOSCOPY  approx 2013    COLONOSCOPY N/A 12/20/2021    Procedure: COLONOSCOPY to cecum with cold polypectomy;  Surgeon: Cleo Bridges MD;  Location: [facility] ENDOSCOPY;  Service: Gastroenterology;  Laterality: N/A;  PRE - screening  POST - polyp, hemorrhoids    CORONARY STENT PLACEMENT      HYSTERECTOMY      NECK SURGERY      SPINE SURGERY      spinal fusion of cervical region    SUBTOTAL HYSTERECTOMY         Social History     Socioeconomic History    Marital status:    Tobacco Use    Smoking status: Never     Passive exposure: Never    Smokeless tobacco: Never    Tobacco comments:     CAFFEINE USE- 2 GLASSES TEA DAILY   Vaping Use    Vaping status: Never Used   Substance and Sexual Activity    Alcohol use: Yes     Alcohol/week: 2.0 standard drinks of alcohol     Types:  1 Glasses of wine, 1 Shots of liquor per week     Comment: OCCASSIONAL    Drug use: No    Sexual activity: Yes     Partners: Male     Birth control/protection: Post-menopausal, None, Hysterectomy       Family History   Problem Relation Age of Onset    Cancer Mother         breast cancer    Arthritis Mother     Stroke Mother     Pancreatitis Mother     Breast cancer Mother 70    Heart disease Father     Hypertension Father     Heart failure Father        Review of Systems   Constitutional: Negative for decreased appetite, fever, malaise/fatigue and weight loss.   HENT:  Negative for nosebleeds.    Eyes:  Negative for double vision.   Cardiovascular:  Negative for chest pain, claudication, cyanosis, dyspnea on exertion, irregular heartbeat, leg swelling, near-syncope, orthopnea, palpitations, paroxysmal nocturnal dyspnea and syncope.   Respiratory:  Negative for cough, hemoptysis and shortness of breath.    Hematologic/Lymphatic: Negative for bleeding problem.   Skin:  Negative for rash.   Musculoskeletal:  Negative for falls and myalgias.   Gastrointestinal:  Negative for hematochezia, jaundice, melena, nausea and vomiting.   Genitourinary:  Negative for hematuria.   Neurological:  Negative for dizziness and seizures.   Psychiatric/Behavioral:  Negative for altered mental status and memory loss.        Allergies   Allergen Reactions    Lipitor [Atorvastatin] Myalgia     Other reaction(s): Myalgia    Pravachol [Pravastatin Sodium] Myalgia    Pravastatin Myalgia     Other reaction(s): Myalgia    Codeine Itching    Oxycodone-Acetaminophen Itching         Current Outpatient Medications:     aspirin 81 MG EC tablet, Take 1 tablet by mouth Daily., Disp: , Rfl:     Cholecalciferol (VITAMIN D-3) 1000 UNITS capsule, Take 1,000 Units by mouth Daily., Disp: , Rfl:     ezetimibe (ZETIA) 10 MG tablet, Take 1 tablet by mouth Daily., Disp: 90 tablet, Rfl: 3    isosorbide mononitrate (IMDUR) 30 MG 24 hr tablet, Take 1 tablet by  "mouth Daily., Disp: 90 tablet, Rfl: 1    levothyroxine (SYNTHROID, LEVOTHROID) 25 MCG tablet, TAKE 1 TABLET BY MOUTH EVERY DAY, Disp: 90 tablet, Rfl: 1    naproxen (NAPROSYN) 500 MG tablet, TAKE 1 TABLET BY MOUTH 2 TIMES A DAY WITH MEALS, Disp: 60 tablet, Rfl: 0    nebivolol (BYSTOLIC) 5 MG tablet, Take 1 tablet by mouth Daily., Disp: 90 tablet, Rfl: 1    nitroglycerin (NITROSTAT) 0.4 MG SL tablet, 1 under the tongue as needed for angina, may repeat q5mins for up three doses, Disp: 45 tablet, Rfl: 11    pitavastatin calcium (LIVALO) 2 MG tablet tablet, Take 1 tablet by mouth Every Night., Disp: 90 tablet, Rfl: 3    traZODone (DESYREL) 100 MG tablet, TAKE 1 TABLET BY MOUTH AT NIGHT, Disp: 90 tablet, Rfl: 0    magnesium oxide (MAG-OX) 400 MG tablet, Take 1 tablet by mouth Daily., Disp: , Rfl:     omeprazole (priLOSEC) 40 MG capsule, TAKE 1 CAPSULE BY MOUTH EVERY DAY for 4 weeks, Disp: , Rfl:       Objective:     Vitals:    08/01/25 1027   BP: 122/80   Pulse: 75   Weight: 64 kg (141 lb)   Height: 165.1 cm (65\")     Body mass index is 23.46 kg/m².    Constitutional:       Appearance: Well-developed.   Eyes:      General: No scleral icterus.  HENT:      Head: Normocephalic.   Neck:      Thyroid: No thyromegaly.      Vascular: No JVD.      Lymphadenopathy: No cervical adenopathy.   Pulmonary:      Effort: Pulmonary effort is normal.      Breath sounds: Normal breath sounds. No wheezing. No rales.   Cardiovascular:      Normal rate. Regular rhythm.      No gallop.    Edema:     Peripheral edema absent.   Abdominal:      Palpations: Abdomen is soft.      Tenderness: There is no abdominal tenderness.   Musculoskeletal: Normal range of motion. Skin:     General: Skin is warm and dry.      Findings: No rash.   Neurological:      Mental Status: Alert and oriented to person, place, and time.           ECG 12 Lead    Date/Time: 8/1/2025 11:02 AM  Performed by: Dk Martin MD    Authorized by: Dk Martin MD  Comparison: " compared with previous ECG   Similar to previous ECG  Rhythm: sinus rhythm    Clinical impression: normal ECG           Assessment:       Diagnosis Plan   1. CAD in native artery        2. Coronary artery disease involving native coronary artery of native heart without angina pectoris        3. Hyperlipidemia, unspecified hyperlipidemia type        4. Primary hypertension                 Plan:       I think she is doing very well she is asymptomatic functioning at a high level we have her lipids at goal on that LDL 55 or less she has been intolerant of other statins but we added Zetia onto a low-dose of the Ivalo and that has worked.  I am not going to change anything else she has a 40% proximal RCA lesion.  We have to try and manage that medically her stent that is now 11 years old looked great 2 years ago I think it will probably stay open forever and her LV function so has been normal    As always, it has been a pleasure to participate in your patient's care.      Sincerely,       Dk Martin MD

## 2025-08-04 RX ORDER — ISOSORBIDE MONONITRATE 30 MG/1
30 TABLET, EXTENDED RELEASE ORAL DAILY
Qty: 90 TABLET | Refills: 0 | Status: SHIPPED | OUTPATIENT
Start: 2025-08-04

## 2025-08-05 LAB
ALBUMIN SERPL-MCNC: 4.3 G/DL (ref 3.5–5.2)
ALBUMIN/GLOB SERPL: 2.4 G/DL
ALP SERPL-CCNC: 140 U/L (ref 39–117)
ALT SERPL-CCNC: 26 U/L (ref 1–33)
AST SERPL-CCNC: 26 U/L (ref 1–32)
BILIRUB SERPL-MCNC: 0.4 MG/DL (ref 0–1.2)
BUN SERPL-MCNC: 18 MG/DL (ref 8–23)
BUN/CREAT SERPL: 18.6 (ref 7–25)
CALCIUM SERPL-MCNC: 9.2 MG/DL (ref 8.6–10.5)
CHLORIDE SERPL-SCNC: 106 MMOL/L (ref 98–107)
CHOLEST SERPL-MCNC: 156 MG/DL (ref 0–200)
CO2 SERPL-SCNC: 28.1 MMOL/L (ref 22–29)
CREAT SERPL-MCNC: 0.97 MG/DL (ref 0.57–1)
EGFRCR SERPLBLD CKD-EPI 2021: 66.2 ML/MIN/1.73
GLOBULIN SER CALC-MCNC: 1.8 GM/DL
GLUCOSE SERPL-MCNC: 97 MG/DL (ref 65–99)
HDLC SERPL-MCNC: 94 MG/DL (ref 40–60)
LDLC SERPL CALC-MCNC: 50 MG/DL (ref 0–100)
LDLC/HDLC SERPL: 0.54 {RATIO}
MAGNESIUM SERPL-MCNC: 2.1 MG/DL (ref 1.6–2.4)
POTASSIUM SERPL-SCNC: 4.4 MMOL/L (ref 3.5–5.2)
PROT SERPL-MCNC: 6.1 G/DL (ref 6–8.5)
SODIUM SERPL-SCNC: 145 MMOL/L (ref 136–145)
TRIGL SERPL-MCNC: 55 MG/DL (ref 0–150)
VLDLC SERPL CALC-MCNC: 12 MG/DL (ref 5–40)

## 2025-08-11 ENCOUNTER — OFFICE VISIT (OUTPATIENT)
Dept: FAMILY MEDICINE CLINIC | Facility: CLINIC | Age: 65
End: 2025-08-11
Payer: COMMERCIAL

## 2025-08-11 VITALS
HEIGHT: 65 IN | WEIGHT: 140 LBS | HEART RATE: 80 BPM | OXYGEN SATURATION: 97 % | DIASTOLIC BLOOD PRESSURE: 62 MMHG | SYSTOLIC BLOOD PRESSURE: 100 MMHG | TEMPERATURE: 98.4 F | BODY MASS INDEX: 23.32 KG/M2

## 2025-08-11 DIAGNOSIS — R74.8 ELEVATED ALKALINE PHOSPHATASE LEVEL: ICD-10-CM

## 2025-08-11 DIAGNOSIS — Z76.89 ENCOUNTER FOR WEIGHT MANAGEMENT: ICD-10-CM

## 2025-08-11 DIAGNOSIS — R25.2 LEG CRAMPS: ICD-10-CM

## 2025-08-11 DIAGNOSIS — M81.6 LOCALIZED OSTEOPOROSIS WITHOUT CURRENT PATHOLOGICAL FRACTURE: ICD-10-CM

## 2025-08-11 DIAGNOSIS — F41.9 ANXIETY: ICD-10-CM

## 2025-08-11 DIAGNOSIS — E03.9 HYPOTHYROIDISM, UNSPECIFIED TYPE: ICD-10-CM

## 2025-08-11 DIAGNOSIS — I10 PRIMARY HYPERTENSION: ICD-10-CM

## 2025-08-11 DIAGNOSIS — M19.049 ARTHRITIS OF HAND, UNSPECIFIED LATERALITY: ICD-10-CM

## 2025-08-11 DIAGNOSIS — F51.01 PRIMARY INSOMNIA: ICD-10-CM

## 2025-08-11 DIAGNOSIS — I25.10 CAD IN NATIVE ARTERY: ICD-10-CM

## 2025-08-11 DIAGNOSIS — E78.5 HYPERLIPIDEMIA, UNSPECIFIED HYPERLIPIDEMIA TYPE: Primary | ICD-10-CM

## 2025-08-11 DIAGNOSIS — R19.5 LOOSE STOOLS: ICD-10-CM

## 2025-08-11 RX ORDER — BUPROPION HYDROCHLORIDE 150 MG/1
150 TABLET ORAL DAILY
Qty: 30 TABLET | Refills: 5 | Status: SHIPPED | OUTPATIENT
Start: 2025-08-11

## 2025-08-11 RX ORDER — ALENDRONATE SODIUM 70 MG/1
70 TABLET ORAL
Qty: 4 TABLET | Refills: 5 | Status: SHIPPED | OUTPATIENT
Start: 2025-08-11

## 2025-08-22 ENCOUNTER — TELEPHONE (OUTPATIENT)
Dept: FAMILY MEDICINE CLINIC | Facility: CLINIC | Age: 65
End: 2025-08-22
Payer: COMMERCIAL

## 2025-08-22 DIAGNOSIS — M85.89 OSTEOPENIA OF MULTIPLE SITES: Primary | ICD-10-CM

## 2025-08-22 DIAGNOSIS — M81.0 OSTEOPOROSIS WITHOUT CURRENT PATHOLOGICAL FRACTURE, UNSPECIFIED OSTEOPOROSIS TYPE: ICD-10-CM

## (undated) DEVICE — LN SMPL CO2 SHTRM SD STREAM W/M LUER

## (undated) DEVICE — GLIDESHEATH BASIC HYDROPHILIC COATED INTRODUCER SHEATH: Brand: GLIDESHEATH

## (undated) DEVICE — CATH DIAG CARD PERFORM JR4.0 BT 4F100CM

## (undated) DEVICE — TUBING, SUCTION, 1/4" X 10', STRAIGHT: Brand: MEDLINE

## (undated) DEVICE — SENSR O2 OXIMAX FNGR A/ 18IN NONSTR

## (undated) DEVICE — KT ORCA ORCAPOD DISP STRL

## (undated) DEVICE — ADAPT CLN BIOGUARD AIR/H2O DISP

## (undated) DEVICE — PK CATH CARD 40

## (undated) DEVICE — 6F .070 JR 4 100CM: Brand: CORDIS

## (undated) DEVICE — GW PRESSUREWIRE AERIS W/ AGILE TP 175CM

## (undated) DEVICE — CANN O2 ETCO2 FITS ALL CONN CO2 SMPL A/ 7IN DISP LF

## (undated) DEVICE — GW EMR FIX EXCHG J STD .035 3MM 260CM

## (undated) DEVICE — SINGLE-USE BIOPSY FORCEPS: Brand: RADIAL JAW 4

## (undated) DEVICE — CATH DIAG CARD PERFORMA JL3.5 BT 4F100CM

## (undated) DEVICE — KT MANIFLD CARDIAC

## (undated) DEVICE — CATH4F INF PIG 145Â° MOD 110CM: Brand: INFINITI